# Patient Record
Sex: MALE | Race: WHITE | NOT HISPANIC OR LATINO | URBAN - METROPOLITAN AREA
[De-identification: names, ages, dates, MRNs, and addresses within clinical notes are randomized per-mention and may not be internally consistent; named-entity substitution may affect disease eponyms.]

---

## 2017-01-17 ENCOUNTER — PATIENT MESSAGE (OUTPATIENT)
Dept: FAMILY MEDICINE | Facility: CLINIC | Age: 62
End: 2017-01-17

## 2017-01-17 ENCOUNTER — TELEPHONE (OUTPATIENT)
Dept: FAMILY MEDICINE | Facility: CLINIC | Age: 62
End: 2017-01-17

## 2017-01-17 NOTE — TELEPHONE ENCOUNTER
----- Message from Nenita Howard sent at 1/17/2017  7:49 AM CST -----  Contact: self  Patient's blood sugar is running high and needs an order for A1C bloodwork. Patietn would like to come in for labs tomorrow morning.  Please call patient at [phone]. Thanks!

## 2017-01-19 ENCOUNTER — DOCUMENTATION ONLY (OUTPATIENT)
Dept: FAMILY MEDICINE | Facility: CLINIC | Age: 62
End: 2017-01-19

## 2017-01-19 NOTE — PROGRESS NOTES
Pre-Visit Chart Review  For Appointment Scheduled on 1/20/17    Health Maintenance Due   Topic Date Due    Influenza Vaccine  08/01/2016    Zoster Vaccine  11/13/2016    Hemoglobin A1c  12/27/2016

## 2017-01-20 ENCOUNTER — LAB VISIT (OUTPATIENT)
Dept: LAB | Facility: HOSPITAL | Age: 62
End: 2017-01-20
Attending: NURSE PRACTITIONER
Payer: COMMERCIAL

## 2017-01-20 ENCOUNTER — OFFICE VISIT (OUTPATIENT)
Dept: FAMILY MEDICINE | Facility: CLINIC | Age: 62
End: 2017-01-20
Payer: COMMERCIAL

## 2017-01-20 VITALS
HEIGHT: 61 IN | HEART RATE: 71 BPM | TEMPERATURE: 99 F | DIASTOLIC BLOOD PRESSURE: 68 MMHG | WEIGHT: 177.5 LBS | SYSTOLIC BLOOD PRESSURE: 115 MMHG | BODY MASS INDEX: 33.51 KG/M2

## 2017-01-20 DIAGNOSIS — E11.319 CONTROLLED TYPE 2 DIABETES MELLITUS WITH RETINOPATHY, WITHOUT LONG-TERM CURRENT USE OF INSULIN, MACULAR EDEMA PRESENCE UNSPECIFIED, UNSPECIFIED LATERALITY, UNSPECIFIED RETINOPATHY SEVERITY: Primary | ICD-10-CM

## 2017-01-20 DIAGNOSIS — E11.59 HYPERTENSION ASSOCIATED WITH DIABETES: ICD-10-CM

## 2017-01-20 DIAGNOSIS — E11.9 CONTROLLED TYPE 2 DIABETES MELLITUS WITHOUT COMPLICATION, WITHOUT LONG-TERM CURRENT USE OF INSULIN: ICD-10-CM

## 2017-01-20 DIAGNOSIS — E66.9 OBESITY (BMI 30.0-34.9): ICD-10-CM

## 2017-01-20 DIAGNOSIS — I15.2 HYPERTENSION ASSOCIATED WITH DIABETES: ICD-10-CM

## 2017-01-20 LAB
ALBUMIN SERPL BCP-MCNC: 4.1 G/DL
ALP SERPL-CCNC: 81 U/L
ALT SERPL W/O P-5'-P-CCNC: 42 U/L
ANION GAP SERPL CALC-SCNC: 7 MMOL/L
AST SERPL-CCNC: 26 U/L
BILIRUB SERPL-MCNC: 0.9 MG/DL
BUN SERPL-MCNC: 12 MG/DL
CALCIUM SERPL-MCNC: 9.4 MG/DL
CHLORIDE SERPL-SCNC: 104 MMOL/L
CO2 SERPL-SCNC: 29 MMOL/L
CREAT SERPL-MCNC: 1 MG/DL
EST. GFR  (AFRICAN AMERICAN): >60 ML/MIN/1.73 M^2
EST. GFR  (NON AFRICAN AMERICAN): >60 ML/MIN/1.73 M^2
GLUCOSE SERPL-MCNC: 126 MG/DL
POTASSIUM SERPL-SCNC: 4.5 MMOL/L
PROT SERPL-MCNC: 7.3 G/DL
SODIUM SERPL-SCNC: 140 MMOL/L

## 2017-01-20 PROCEDURE — 36415 COLL VENOUS BLD VENIPUNCTURE: CPT | Mod: PO

## 2017-01-20 PROCEDURE — 83036 HEMOGLOBIN GLYCOSYLATED A1C: CPT

## 2017-01-20 PROCEDURE — 4010F ACE/ARB THERAPY RXD/TAKEN: CPT | Mod: S$GLB,,, | Performed by: NURSE PRACTITIONER

## 2017-01-20 PROCEDURE — 3044F HG A1C LEVEL LT 7.0%: CPT | Mod: S$GLB,,, | Performed by: NURSE PRACTITIONER

## 2017-01-20 PROCEDURE — 3078F DIAST BP <80 MM HG: CPT | Mod: S$GLB,,, | Performed by: NURSE PRACTITIONER

## 2017-01-20 PROCEDURE — 80053 COMPREHEN METABOLIC PANEL: CPT

## 2017-01-20 PROCEDURE — 99214 OFFICE O/P EST MOD 30 MIN: CPT | Mod: S$GLB,,, | Performed by: NURSE PRACTITIONER

## 2017-01-20 PROCEDURE — 2022F DILAT RTA XM EVC RTNOPTHY: CPT | Mod: S$GLB,,, | Performed by: NURSE PRACTITIONER

## 2017-01-20 PROCEDURE — 99999 PR PBB SHADOW E&M-EST. PATIENT-LVL III: CPT | Mod: PBBFAC,,, | Performed by: NURSE PRACTITIONER

## 2017-01-20 PROCEDURE — 3074F SYST BP LT 130 MM HG: CPT | Mod: S$GLB,,, | Performed by: NURSE PRACTITIONER

## 2017-01-20 PROCEDURE — 1159F MED LIST DOCD IN RCRD: CPT | Mod: S$GLB,,, | Performed by: NURSE PRACTITIONER

## 2017-01-20 NOTE — MR AVS SNAPSHOT
[facility]  [address]  [place] [address]  Phone: [phone]  Fax: [phone]                  Aleksandra Keys   2017 11:00 AM   Office Visit    Description:  Male : 1955   Provider:  GUERA Kelly   Department:  [facility]           Reason for Visit     Diabetes           Diagnoses this Visit        Comments    Controlled type 2 diabetes mellitus without complication, without long-term current use of insulin    -  Primary            To Do List           Future Appointments        Provider Department Dept Phone    2017 3:00 PM GUERA Kelly [facility] [phone]      Goals (5 Years of Data)     None      Follow-Up and Disposition     Return in about 3 months (around 2017) for labs now.      [facility]     [facility] - 24/7 Assistance  Registered nurses in the [facility] provide clinical advisement, health education, appointment booking, and other advisory services.  Call for this free service at [phone].             Medications           Message regarding Medications     Verify the changes and/or additions to your medication regime listed below are the same as discussed with your clinician today.  If any of these changes or additions are incorrect, please notify your healthcare provider.             Verify that the below list of medications is an accurate representation of the medications you are currently taking.  If none reported, the list may be blank. If incorrect, please contact your healthcare provider. Carry this list with you in case of emergency.           Current Medications     alprazolam (XANAX) 0.25 MG tablet Take 0.25 mg by mouth nightly as needed.     aspirin 81 mg Tab Take 1 tablet by mouth once daily.     atorvastatin (LIPITOR) 40 MG tablet TAKE ONE TABLET BY MOUTH ONCE DAILY    blood-glucose meter (ACCU-CHEK DUGLAS) Misc Check blood sugar once daily,  "accu-check felton glucometer kit with fastclix lancet device    lancets (ACCU-CHEK FASTCLIX) Misc Check sugar once daily, fastclix lancet drum    lisinopril (PRINIVIL,ZESTRIL) 2.5 MG tablet TAKE ONE TABLET BY MOUTH ONCE DAILY    metformin (GLUCOPHAGE-XR) 500 MG 24 hr tablet TAKE TWO TABLETS BY MOUTH ONCE DAILY    metoprolol succinate (TOPROL-XL) 25 MG 24 hr tablet Take 1 tablet (25 mg total) by mouth once daily.           Clinical Reference Information           Vital Signs - Last Recorded  Most recent update: 1/20/2017 10:55 AM by Bhumi Conroy MA    BP Pulse Temp Ht Wt BMI    115/68 (BP Location: Right arm, Patient Position: Sitting, BP Method: Automatic) 71 98.5 °F (36.9 °C) (Oral) 5' 1" (1.549 m) 80.5 kg (177 lb 7.5 oz) 33.53 kg/m2      Blood Pressure          Most Recent Value    BP  115/68      Allergies as of 1/20/2017     Niacin      Immunizations Administered on Date of Encounter - 1/20/2017     None      Orders Placed During Today's Visit     Future Labs/Procedures Expected by Expires    Comprehensive metabolic panel  1/20/2017 3/21/2018    Hemoglobin A1c  1/20/2017 3/21/2018      Instructions      Controlling High Blood Pressure  High blood pressure (hypertension) is often called the silent killer. This is because many people who have it dont know it. High blood pressure is defined as 140/90 mm Hg or higher. Know your blood pressure and remember to check it regularly. Doing so can save your life. Here are some things you can do to help control your blood pressure.    Choose heart-healthy foods  · Select low-salt, low-fat foods. Limit sodium intake to 2,400 mg per day or the amount suggested by your healthcare provider.  · Limit canned, dried, cured, packaged, and fast foods. These can contain a lot of salt.  · Eat 8 to 10 servings of fruits and vegetables every day.  · Choose lean meats, fish, or chicken.  · Eat whole-grain pasta, brown rice, and beans.  · Eat 2 to 3 servings of low-fat or fat-free " dairy products.  · Ask your doctor about the DASH eating plan. This plan helps reduce blood pressure.  · When you go to a restaurant, ask that your meal be prepared with no added salt.  Maintain a healthy weight  · Ask your healthcare provider how many calories to eat a day. Then stick to that number.  · Ask your healthcare provider what weight range is healthiest for you. If you are overweight, a weight loss of only 3% to 5% of your body weight can help lower blood pressure. Generally, a good weight loss goal is to lose 10% of your body weight in a year.  · Limit snacks and sweets.  · Get regular exercise.  Get up and get active  · Choose activities you enjoy. Find ones you can do with friends or family. This includes bicycling, dancing, walking, and jogging.  · Park farther away from building entrances.  · Use stairs instead of the elevator.  · When you can, walk or bike instead of driving.  · [place] leaves, garden, or do household repairs.  · Be active at a moderate to vigorous level of physical activity for at least 40 minutes for a minimum of 3 to 4 days a week.   Manage stress  · Make time to relax and enjoy life. Find time to laugh.  · Communicate your concerns with your loved ones and your healthcare provider.  · Visit with family and friends, and keep up with hobbies.  Limit alcohol and quit smoking  · Men should have no more than 2 drinks per day.  · Women should have no more than 1 drink per day.  · Talk with your healthcare provider about quitting smoking. Smoking significantly increases your risk for heart disease and stroke. Ask your healthcare provider about community smoking cessation programs and other options.  Medicines  If lifestyle changes arent enough, your healthcare provider may prescribe high blood pressure medicine. Take all medicines as prescribed. If you have any questions about your medicines, ask your healthcare provider before stopping or changing them.   © 4277-8774 [employer]. [address] [place] [address], [place] [address]. All rights reserved. This information is not intended as a substitute for professional medical care. Always follow your healthcare professional's instructions.        Diabetes (General Information)  Diabetes is a long-term health problem. It means your body does not make enough insulin. Or it may mean that your body cannot use the insulin it makes. Insulin is a hormone in your body. It lets blood sugar (glucose) reach the cells in your body. All of your cells need glucose for fuel.  When you have diabetes, the glucose in your blood builds up because it cannot get into the cells. This buildup is called high blood sugar (hyperglycemia).  Your blood sugar level depends on several things. It depends on what kind of food you eat and how much of it you eat. It also depends on how much exercise you get, and how much insulin you have in your body. Eating too much of the wrong kinds of food or not taking diabetes medicine on time can cause high blood sugar. Infections can cause high blood sugar even if you are taking medicines correctly.  These things can also cause low blood sugar:  · Missing meals  · Not eating enough food  · Taking too much diabetes medicine  Diabetes can cause serious problems over time if you do not get treated. These problems include heart disease, stroke, kidney failure, and blindness. They also include nerve pain or loss of feeling in your legs and feet, and gangrene of the feet. By keeping your blood sugar under control you can prevent or delay these problems.  Normal blood sugar levels are 80 to 100 before a meal and less than 180 in the 1 to 2 hours after a meal.  Home care  Follow these guidelines when caring for yourself at home:  · Follow the diet your healthcare provider gives you. Take insulin or other diabetes medicine exactly as told to.  · Watch your blood sugar as you are told to. Keep a log of your results. This will help your  provider change your medicines to keep your blood sugar under control.  · Try to reach your ideal weight. You may be able to cut back on or not have to take diabetes medicine if you eat the right foods and get exercise.  · Do not smoke. Smoking worsens the effects of diabetes on your circulation. You are much more likely to have a heart attack if you have diabetes and you smoke.  · Take good care of your feet. If you have lost feeling in your feet, you may not see an injury or infection. Check your feet and between your toes at least once a week.  · Wear a medical alert bracelet or necklace, or carry a card in your wallet that says you have diabetes. This will help healthcare providers give you the right care if you get very ill and cannot tell them that you have diabetes.  Sick day plan  If you get a cold, the flu, or a bacterial or viral infection, take these steps:  · Look at your diabetes sick plan and call your healthcare provider as you were told to. You may need to call your provider right away if:  ¨ Your blood sugar is above 240 while taking your diabetes medicine  ¨ Your urine ketone levels are above normal or high  ¨ You have been vomiting more than 6 hours  ¨ You have trouble breathing or your breath ha s a fruity smell  ¨ You have a high fever  ¨ You have a fever for several days and you are not getting better  ¨ You get light-headed and are sleepier than usual  · Keep taking your diabetes pills (oral medicine) even if you have been vomiting and are feeling sick. Call your provider right away because you may need insulin to lower your blood sugar until you recover from your illness.  · Keep taking your insulin even if you have been vomiting and are feeling sick. Call your provider right away to ask if you need to change your insulin dose. This will depend on your blood sugar results.  · Check your blood sugar every 2 to 4 hours, or at least 4 times a day.  · Check your ketones often. If you are vomiting  and having diarrhea, watch them more often.  · Do not skip meals. Try to eat small meals on a regular schedule. Do this even if you do not feel like eating.  · Drink water or other liquids that do not have caffeine or calories. This will keep you from getting dehydrated. If you are nauseated or vomiting, takes small sips every 5 minutes. To prevent dehydration try to drink a cup (8 ounces) of fluids every hour while you are awake.  General care  Always bring a source of fast-acting sugar with you in case you have symptoms of low blood sugar (below 70). At the first sign of low blood sugar, eat or drink 15 to 20 grams of fast-acting sugar to raise your blood sugar. Examples are:  · 3 to 4 glucose tablets. You can buy these at most drugstores.  · 4 ounces (1/2 cup) of regular (not diet) soft drinks  · 4 ounces (1/2 cup) of any fruit juice  · 8 ounces (1 cup) of milk  · 5 to 6 pieces of hard candy  · 1 tablespoon of honey  Check your blood sugar 15 minutes after treating yourself. If it is still below 70, take 15 to 20 more grams of fast-acting sugar. Test again in 15 minutes. If it returns to normal (70 or above), eat a snack or meal to keep your blood sugar in a safe range. If it stays low, call your doctor or go to an emergency room.  Follow-up care  Follow-up with your healthcare provider, or as advised. For more information about diabetes, visit the American Diabetes Association website at www.diabetes.org or call 606-582-4285.  When to seek medical advice  Call your healthcare provider right away if you have any of these symptoms of high blood sugar:  · Frequent urination  · Dizziness  · Drowsiness  · Thirst  · Headache  · Nausea or vomiting  · Abdominal pain  · Eyesight changes  · Fast breathing  · Confusion or loss of consciousness  Also call your provider right away if you have any of these signs of low blood sugar:  · Fatigue  · Headache  · Shakes  · Excess sweating  · Hunger  · Feeling anxious or  restless  · Eyesight changes  · Drowsiness  · Weakness  · Confusion or loss of consciousness  Call 911  Call for emergency help right away if any of these occur:  · Chest pain or shortness of breath  · Dizziness or fainting  · Weakness of an arm or leg or one side of the face  · Trouble speaking or seeing   © 20007665-1503 [employer]. [address] [place] [address], [place] [address]. All rights reserved. This information is not intended as a substitute for professional medical care. Always follow your healthcare professional's instructions.        Weight Management: Getting Started  Healthy bodies come in all shapes and sizes. Not all bodies are made to be thin. For some people, a healthy weight is higher than the average weight listed on weight charts. Your healthcare provider can help you decide on a healthy weight for you.    Reasons to lose weight  Losing weight can help with some health problems, such as high blood pressure, heart disease, diabetes, sleep apnea, and arthritis. You may also feel more energy.  Set your long-term goal  Your goal doesn't even have to be a specific weight. You may decide on a fitness goal (such as being able to walk 10 miles a week), or a health goal (such as lowering your blood pressure). Choose a goal that is measurable and reasonable, so you know when you've reached it. A goal of reaching a BMI of less than 25 is not always reasonable (or possible).   Make an action plan  Habits dont change overnight. Setting your goals too high can leave you feeling discouraged if you cant reach them. Be realistic. Choose one or two small changes you can make now. Set an action plan for how you are going to make these changes. When you can stick to this plan, keep making a few more small changes. Taking small steps will help you stay on the path to success.  Track your progress  Write down your goals. Then, keep a daily record of your progress. Write down what you eat and how active you  are. This record lets you look back on how much youve done. It may also help when youre feeling frustrated. Reward yourself for success. Even if you dont reach every goal, give yourself credit for what you do get done.  Get support  Encouragement from others can help make losing weight easier. Ask your family members and friends for support. They may even want to join you. Also look to your healthcare provider, registered dietitian, and  for help. Your local hospital can give you more information about nutrition, exercise, and weight loss.  © 1999-7238 "[employer] [address] [place] [address], [place] [address]. All rights reserved. This information is not intended as a substitute for professional medical care. Always follow your healthcare professional's instructions.        Walking for Fitness  Fitness walking has something for everyone, even people who are already fit. Walking is one of the safest ways to condition your body aerobically. It can boost energy, help you lose weight, and reduce stress.    Physical benefits  · Walking strengthens your heart and lungs, and tones your muscles.  · When walking, your feet land with less impact than in other sports. This reduces chances of muscle, bone, and joint injury.  · Regular walking improves your cholesterol levels and lowers your risk of heart disease. And it helps you control your blood sugar if you have diabetes.  · Walking is a weight-bearing activity, which helps maintain bone density. This can help prevent osteoporosis.  Personal rewards  · Taking walks can help you relax and manage stress. And fitness walking may make you feel better about yourself.  · Walking can help you sleep better at night and make you less likely to be depressed.  · Regular walking may help maintain your memory as you get older.  · Walking is a great way to spend extra time with friends and family members. Be sure to invite your dog along!  Q&A about  fitness walking  Q: Will walking keep me fit?  A: Yes. Regular walking at the right pace gives you all the benefits of other aerobic activities, such as jogging and swimming.  Q: Will walking help me lose weight and keep it off?  A: Yes. Per mile, walking can burn as many calories as jogging. Your health care provider can help work walking into your weight-loss plan.  Q: Is walking safe for my health?  A: Yes. Walking is safe if you have high blood pressure, diabetes, heart disease, or other conditions. Talk to your health care provider before you start.  © 8978-7569 [employer]. [address] [place] [address], [place] [address]. All rights reserved. This information is not intended as a substitute for professional medical care. Always follow your healthcare professional's instructions.      [employer]

## 2017-01-20 NOTE — PROGRESS NOTES
Subjective:       Patient ID: Aleksandra Keys is a 61 y.o. male.    Chief Complaint: Diabetes    HPI Comments: Mr. Keys presents to the clinic today for follow up for diabetes.  He states his blood sugars have been running a little higher recently.  He checks in the morning before breakfast and blood sugars recently have been 160's-180's.  He takes metformin.  He wants to keep A1c well controlled due to family history of complications from diabetes.  He has an active job but does not exercise routinely.  He attempts to eat a healthy diet.  He states he just saw his Ophthalmologist and he has diabetic retinopathy.  He is due for A1c.  He had flu shot at Urgent Care.      Diabetes   He has type 2 diabetes mellitus. No MedicAlert identification noted. The initial diagnosis of diabetes was made 6 years ago. Hypoglycemia symptoms include sweats. Pertinent negatives for hypoglycemia include no confusion, dizziness, headaches, hunger, mood changes, nervousness/anxiousness, pallor, seizures, sleepiness, speech difficulty or tremors. Pertinent negatives for diabetes include no blurred vision, no chest pain, no fatigue, no foot paresthesias, no foot ulcerations, no polydipsia, no polyphagia, no polyuria, no visual change, no weakness and no weight loss. Pertinent negatives for hypoglycemia complications include no blackouts, no hospitalization, no nocturnal hypoglycemia, no required assistance and no required glucagon injection. Symptoms are worsening. Pertinent negatives for diabetic complications include no autonomic neuropathy, CVA, heart disease, impotence, nephropathy, peripheral neuropathy, PVD or retinopathy. Risk factors for coronary artery disease include dyslipidemia, hypertension, obesity, diabetes mellitus and male sex. Current diabetic treatment includes diet and oral agent (monotherapy). He is compliant with treatment most of the time. His weight is fluctuating minimally. He is following a generally healthy  diet. Meal planning includes avoidance of concentrated sweets. He has not had a previous visit with a dietitian. He participates in exercise intermittently. He monitors blood glucose at home 1-2 x per day. Blood glucose monitoring compliance is fair. His home blood glucose trend is increasing steadily. He does not see a podiatrist.Eye exam is current.     Review of Systems   Constitutional: Negative for chills, fatigue, unexpected weight change and weight loss.   HENT: Negative for congestion, ear discharge, ear pain, postnasal drip and sinus pressure.    Eyes: Negative for blurred vision and visual disturbance.   Respiratory: Negative for cough and shortness of breath.    Cardiovascular: Negative for chest pain.   Endocrine: Negative for polydipsia, polyphagia and polyuria.   Genitourinary: Negative for impotence.   Skin: Negative for pallor.   Neurological: Negative for dizziness, tremors, seizures, speech difficulty, weakness and headaches.   Psychiatric/Behavioral: Negative for confusion. The patient is not nervous/anxious.        Objective:      Physical Exam   Constitutional: He is oriented to person, place, and time. He appears well-developed and well-nourished. No distress.   HENT:   Head: Normocephalic and atraumatic.   Right Ear: External ear normal.   Left Ear: External ear normal.   Mouth/Throat: Oropharynx is clear and moist. No oropharyngeal exudate.   Eyes: Pupils are equal, round, and reactive to light. Right eye exhibits no discharge. Left eye exhibits no discharge.   Neck: Neck supple. No thyromegaly present.   Cardiovascular: Normal rate and regular rhythm.  Exam reveals no gallop and no friction rub.    No murmur heard.  Pulmonary/Chest: Effort normal and breath sounds normal. No respiratory distress. He has no wheezes. He has no rales.   Abdominal: Soft. He exhibits no distension. There is no tenderness.   Lymphadenopathy:     He has no cervical adenopathy.   Neurological: He is alert and  oriented to person, place, and time. Coordination normal.   Skin: Skin is warm and dry.   Psychiatric: He has a normal mood and affect. His behavior is normal. Thought content normal.   Vitals reviewed.          Current Outpatient Prescriptions:     alprazolam (XANAX) 0.25 MG tablet, Take 0.25 mg by mouth nightly as needed. , Disp: , Rfl: 5    aspirin 81 mg Tab, Take 1 tablet by mouth once daily. , Disp: , Rfl:     atorvastatin (LIPITOR) 40 MG tablet, TAKE ONE TABLET BY MOUTH ONCE DAILY, Disp: 30 tablet, Rfl: 9    blood-glucose meter (ACCU-CHEK DUGLAS) Misc, Check blood sugar once daily, accu-check duglas glucometer kit with fastclix lancet device, Disp: 1 each, Rfl: 0    lancets (ACCU-CHEK FASTCLIX) Misc, Check sugar once daily, fastclix lancet drum, Disp: 50 each, Rfl: 11    lisinopril (PRINIVIL,ZESTRIL) 2.5 MG tablet, TAKE ONE TABLET BY MOUTH ONCE DAILY, Disp: 30 tablet, Rfl: 11    metformin (GLUCOPHAGE-XR) 500 MG 24 hr tablet, TAKE TWO TABLETS BY MOUTH ONCE DAILY, Disp: 60 tablet, Rfl: 3    metoprolol succinate (TOPROL-XL) 25 MG 24 hr tablet, Take 1 tablet (25 mg total) by mouth once daily., Disp: 30 tablet, Rfl: 11  Assessment:       1. Controlled type 2 diabetes mellitus with retinopathy, without long-term current use of insulin, macular edema presence unspecified, unspecified laterality, unspecified retinopathy severity    2. Obesity (BMI 30.0-34.9)    3. Hypertension associated with diabetes        Plan:      Controlled type 2 diabetes mellitus with retinopathy, without long-term current use of insulin, macular edema presence unspecified, unspecified laterality, unspecified retinopathy severity  Wants A1c goal at 6.5.  Also would like to consider Victoza.  Discussed risks of treatment, findings that showed thyroid cancer in lab rats.  May help with weight loss.  -     Comprehensive metabolic panel; Future; Expected date: 1/20/17  -     Hemoglobin A1c; Future; Expected date: 1/20/17    Obesity (BMI  30.0-34.9)    Hypertension associated with diabetes     Patient readiness: acceptance and barriers:social stressors and occupational issues    During the course of the visit the patient was educated and counseled about the following:     Diabetes:  Discussed general issues about diabetes pathophysiology and management.  Counseling at today's visit: discussed the need for weight loss.  Encouraged aerobic exercise.  Discussed foot care.  Reminded to get yearly retinal exam.  Follow up in 3 months or as needed.  Hypertension:   Dietary sodium restriction.  Regular aerobic exercise.  Follow up: 3 months and as needed.  Obesity:   Diet interventions: moderate (500 kCal/d) deficit diet and qualitative changes (increase low-fat,  high-fiber foods).  Regular aerobic exercise program discussed.    Goals: Diabetes: Maintain Hemoglobin A1C below 7, Hypertension: Reduce Blood Pressure and Obesity: Reduce calorie intake and BMI    Did patient meet goals/outcomes: No    The following self management tools provided: declined    Patient Instructions (the written plan) was given to the patient/family.     Time spent with patient: 30 minutes

## 2017-01-20 NOTE — PATIENT INSTRUCTIONS
Controlling High Blood Pressure  High blood pressure (hypertension) is often called the silent killer. This is because many people who have it dont know it. High blood pressure is defined as 140/90 mm Hg or higher. Know your blood pressure and remember to check it regularly. Doing so can save your life. Here are some things you can do to help control your blood pressure.    Choose heart-healthy foods  · Select low-salt, low-fat foods. Limit sodium intake to 2,400 mg per day or the amount suggested by your healthcare provider.  · Limit canned, dried, cured, packaged, and fast foods. These can contain a lot of salt.  · Eat 8 to 10 servings of fruits and vegetables every day.  · Choose lean meats, fish, or chicken.  · Eat whole-grain pasta, brown rice, and beans.  · Eat 2 to 3 servings of low-fat or fat-free dairy products.  · Ask your doctor about the DASH eating plan. This plan helps reduce blood pressure.  · When you go to a restaurant, ask that your meal be prepared with no added salt.  Maintain a healthy weight  · Ask your healthcare provider how many calories to eat a day. Then stick to that number.  · Ask your healthcare provider what weight range is healthiest for you. If you are overweight, a weight loss of only 3% to 5% of your body weight can help lower blood pressure. Generally, a good weight loss goal is to lose 10% of your body weight in a year.  · Limit snacks and sweets.  · Get regular exercise.  Get up and get active  · Choose activities you enjoy. Find ones you can do with friends or family. This includes bicycling, dancing, walking, and jogging.  · Park farther away from building entrances.  · Use stairs instead of the elevator.  · When you can, walk or bike instead of driving.  · [place] leaves, garden, or do household repairs.  · Be active at a moderate to vigorous level of physical activity for at least 40 minutes for a minimum of 3 to 4 days a week.   Manage stress  · Make time to relax and enjoy  life. Find time to laugh.  · Communicate your concerns with your loved ones and your healthcare provider.  · Visit with family and friends, and keep up with hobbies.  Limit alcohol and quit smoking  · Men should have no more than 2 drinks per day.  · Women should have no more than 1 drink per day.  · Talk with your healthcare provider about quitting smoking. Smoking significantly increases your risk for heart disease and stroke. Ask your healthcare provider about community smoking cessation programs and other options.  Medicines  If lifestyle changes arent enough, your healthcare provider may prescribe high blood pressure medicine. Take all medicines as prescribed. If you have any questions about your medicines, ask your healthcare provider before stopping or changing them.   © 9220-8569 [employer]. [address] [place] [address], [place] [address]. All rights reserved. This information is not intended as a substitute for professional medical care. Always follow your healthcare professional's instructions.        Diabetes (General Information)  Diabetes is a long-term health problem. It means your body does not make enough insulin. Or it may mean that your body cannot use the insulin it makes. Insulin is a hormone in your body. It lets blood sugar (glucose) reach the cells in your body. All of your cells need glucose for fuel.  When you have diabetes, the glucose in your blood builds up because it cannot get into the cells. This buildup is called high blood sugar (hyperglycemia).  Your blood sugar level depends on several things. It depends on what kind of food you eat and how much of it you eat. It also depends on how much exercise you get, and how much insulin you have in your body. Eating too much of the wrong kinds of food or not taking diabetes medicine on time can cause high blood sugar. Infections can cause high blood sugar even if you are taking medicines correctly.  These things can also cause low  blood sugar:  · Missing meals  · Not eating enough food  · Taking too much diabetes medicine  Diabetes can cause serious problems over time if you do not get treated. These problems include heart disease, stroke, kidney failure, and blindness. They also include nerve pain or loss of feeling in your legs and feet, and gangrene of the feet. By keeping your blood sugar under control you can prevent or delay these problems.  Normal blood sugar levels are 80 to 100 before a meal and less than 180 in the 1 to 2 hours after a meal.  Home care  Follow these guidelines when caring for yourself at home:  · Follow the diet your healthcare provider gives you. Take insulin or other diabetes medicine exactly as told to.  · Watch your blood sugar as you are told to. Keep a log of your results. This will help your provider change your medicines to keep your blood sugar under control.  · Try to reach your ideal weight. You may be able to cut back on or not have to take diabetes medicine if you eat the right foods and get exercise.  · Do not smoke. Smoking worsens the effects of diabetes on your circulation. You are much more likely to have a heart attack if you have diabetes and you smoke.  · Take good care of your feet. If you have lost feeling in your feet, you may not see an injury or infection. Check your feet and between your toes at least once a week.  · Wear a medical alert bracelet or necklace, or carry a card in your wallet that says you have diabetes. This will help healthcare providers give you the right care if you get very ill and cannot tell them that you have diabetes.  Sick day plan  If you get a cold, the flu, or a bacterial or viral infection, take these steps:  · Look at your diabetes sick plan and call your healthcare provider as you were told to. You may need to call your provider right away if:  ¨ Your blood sugar is above 240 while taking your diabetes medicine  ¨ Your urine ketone levels are above normal or  high  ¨ You have been vomiting more than 6 hours  ¨ You have trouble breathing or your breath ha s a fruity smell  ¨ You have a high fever  ¨ You have a fever for several days and you are not getting better  ¨ You get light-headed and are sleepier than usual  · Keep taking your diabetes pills (oral medicine) even if you have been vomiting and are feeling sick. Call your provider right away because you may need insulin to lower your blood sugar until you recover from your illness.  · Keep taking your insulin even if you have been vomiting and are feeling sick. Call your provider right away to ask if you need to change your insulin dose. This will depend on your blood sugar results.  · Check your blood sugar every 2 to 4 hours, or at least 4 times a day.  · Check your ketones often. If you are vomiting and having diarrhea, watch them more often.  · Do not skip meals. Try to eat small meals on a regular schedule. Do this even if you do not feel like eating.  · Drink water or other liquids that do not have caffeine or calories. This will keep you from getting dehydrated. If you are nauseated or vomiting, takes small sips every 5 minutes. To prevent dehydration try to drink a cup (8 ounces) of fluids every hour while you are awake.  General care  Always bring a source of fast-acting sugar with you in case you have symptoms of low blood sugar (below 70). At the first sign of low blood sugar, eat or drink 15 to 20 grams of fast-acting sugar to raise your blood sugar. Examples are:  · 3 to 4 glucose tablets. You can buy these at most drugstores.  · 4 ounces (1/2 cup) of regular (not diet) soft drinks  · 4 ounces (1/2 cup) of any fruit juice  · 8 ounces (1 cup) of milk  · 5 to 6 pieces of hard candy  · 1 tablespoon of honey  Check your blood sugar 15 minutes after treating yourself. If it is still below 70, take 15 to 20 more grams of fast-acting sugar. Test again in 15 minutes. If it returns to normal (70 or above), eat a  snack or meal to keep your blood sugar in a safe range. If it stays low, call your doctor or go to an emergency room.  Follow-up care  Follow-up with your healthcare provider, or as advised. For more information about diabetes, visit the American Diabetes Association website at www.diabetes.org or call 822-264-3537.  When to seek medical advice  Call your healthcare provider right away if you have any of these symptoms of high blood sugar:  · Frequent urination  · Dizziness  · Drowsiness  · Thirst  · Headache  · Nausea or vomiting  · Abdominal pain  · Eyesight changes  · Fast breathing  · Confusion or loss of consciousness  Also call your provider right away if you have any of these signs of low blood sugar:  · Fatigue  · Headache  · Shakes  · Excess sweating  · Hunger  · Feeling anxious or restless  · Eyesight changes  · Drowsiness  · Weakness  · Confusion or loss of consciousness  Call [phone]  Call for emergency help right away if any of these occur:  · Chest pain or shortness of breath  · Dizziness or fainting  · Weakness of an arm or leg or one side of the face  · Trouble speaking or seeing   © 0517-6999 [employer]. [address] [place] [address]. All rights reserved. This information is not intended as a substitute for professional medical care. Always follow your healthcare professional's instructions.        Weight Management: Getting Started  Healthy bodies come in all shapes and sizes. Not all bodies are made to be thin. For some people, a healthy weight is higher than the average weight listed on weight charts. Your healthcare provider can help you decide on a healthy weight for you.    Reasons to lose weight  Losing weight can help with some health problems, such as high blood pressure, heart disease, diabetes, sleep apnea, and arthritis. You may also feel more energy.  Set your long-term goal  Your goal doesn't even have to be a specific weight. You may decide on a fitness goal  (such as being able to walk 10 miles a week), or a health goal (such as lowering your blood pressure). Choose a goal that is measurable and reasonable, so you know when you've reached it. A goal of reaching a BMI of less than 25 is not always reasonable (or possible).   Make an action plan  Habits dont change overnight. Setting your goals too high can leave you feeling discouraged if you cant reach them. Be realistic. Choose one or two small changes you can make now. Set an action plan for how you are going to make these changes. When you can stick to this plan, keep making a few more small changes. Taking small steps will help you stay on the path to success.  Track your progress  Write down your goals. Then, keep a daily record of your progress. Write down what you eat and how active you are. This record lets you look back on how much youve done. It may also help when youre feeling frustrated. Reward yourself for success. Even if you dont reach every goal, give yourself credit for what you do get done.  Get support  Encouragement from others can help make losing weight easier. Ask your family members and friends for support. They may even want to join you. Also look to your healthcare provider, registered dietitian, and  for help. Your local hospital can give you more information about nutrition, exercise, and weight loss.  © 1375-1446 [employer]. [address] [place] [address], [place] [address]. All rights reserved. This information is not intended as a substitute for professional medical care. Always follow your healthcare professional's instructions.        Walking for Fitness  Fitness walking has something for everyone, even people who are already fit. Walking is one of the safest ways to condition your body aerobically. It can boost energy, help you lose weight, and reduce stress.    Physical benefits  · Walking strengthens your heart and lungs, and tones your muscles.  · When  walking, your feet land with less impact than in other sports. This reduces chances of muscle, bone, and joint injury.  · Regular walking improves your cholesterol levels and lowers your risk of heart disease. And it helps you control your blood sugar if you have diabetes.  · Walking is a weight-bearing activity, which helps maintain bone density. This can help prevent osteoporosis.  Personal rewards  · Taking walks can help you relax and manage stress. And fitness walking may make you feel better about yourself.  · Walking can help you sleep better at night and make you less likely to be depressed.  · Regular walking may help maintain your memory as you get older.  · Walking is a great way to spend extra time with friends and family members. Be sure to invite your dog along!  Q&A about fitness walking  Q: Will walking keep me fit?  A: Yes. Regular walking at the right pace gives you all the benefits of other aerobic activities, such as jogging and swimming.  Q: Will walking help me lose weight and keep it off?  A: Yes. Per mile, walking can burn as many calories as jogging. Your health care provider can help work walking into your weight-loss plan.  Q: Is walking safe for my health?  A: Yes. Walking is safe if you have high blood pressure, diabetes, heart disease, or other conditions. Talk to your health care provider before you start.  © 7326-6848 The [employer]. [address] [place] [address], [place] [address]. All rights reserved. This information is not intended as a substitute for professional medical care. Always follow your healthcare professional's instructions.      [employer]

## 2017-01-22 LAB
ESTIMATED AVG GLUCOSE: 143 MG/DL
HBA1C MFR BLD HPLC: 6.6 %

## 2017-01-23 RX ORDER — METFORMIN HYDROCHLORIDE 500 MG/1
TABLET, EXTENDED RELEASE ORAL
Qty: 90 TABLET | Refills: 3 | Status: SHIPPED | OUTPATIENT
Start: 2017-01-23 | End: 2017-06-24 | Stop reason: SDUPTHER

## 2017-04-18 ENCOUNTER — DOCUMENTATION ONLY (OUTPATIENT)
Dept: FAMILY MEDICINE | Facility: CLINIC | Age: 62
End: 2017-04-18

## 2017-04-18 NOTE — PROGRESS NOTES
Pre-Visit Chart Review  For Appointment Scheduled on 4/21/17    Health Maintenance Due   Topic Date Due    Influenza Vaccine  08/01/2016    Zoster Vaccine  11/13/2016

## 2017-04-21 ENCOUNTER — OFFICE VISIT (OUTPATIENT)
Dept: FAMILY MEDICINE | Facility: CLINIC | Age: 62
End: 2017-04-21
Payer: COMMERCIAL

## 2017-04-21 VITALS
DIASTOLIC BLOOD PRESSURE: 66 MMHG | TEMPERATURE: 98 F | HEIGHT: 61 IN | WEIGHT: 176.13 LBS | BODY MASS INDEX: 33.25 KG/M2 | HEART RATE: 79 BPM | SYSTOLIC BLOOD PRESSURE: 104 MMHG

## 2017-04-21 DIAGNOSIS — E11.59 HYPERTENSION ASSOCIATED WITH DIABETES: ICD-10-CM

## 2017-04-21 DIAGNOSIS — E11.9 CONTROLLED TYPE 2 DIABETES MELLITUS WITHOUT COMPLICATION, WITHOUT LONG-TERM CURRENT USE OF INSULIN: ICD-10-CM

## 2017-04-21 DIAGNOSIS — E66.9 OBESITY, UNSPECIFIED OBESITY SEVERITY, UNSPECIFIED OBESITY TYPE: Primary | ICD-10-CM

## 2017-04-21 DIAGNOSIS — I15.2 HYPERTENSION ASSOCIATED WITH DIABETES: ICD-10-CM

## 2017-04-21 PROCEDURE — 4010F ACE/ARB THERAPY RXD/TAKEN: CPT | Mod: S$GLB,,, | Performed by: NURSE PRACTITIONER

## 2017-04-21 PROCEDURE — 99214 OFFICE O/P EST MOD 30 MIN: CPT | Mod: S$GLB,,, | Performed by: NURSE PRACTITIONER

## 2017-04-21 PROCEDURE — 3074F SYST BP LT 130 MM HG: CPT | Mod: S$GLB,,, | Performed by: NURSE PRACTITIONER

## 2017-04-21 PROCEDURE — 3078F DIAST BP <80 MM HG: CPT | Mod: S$GLB,,, | Performed by: NURSE PRACTITIONER

## 2017-04-21 PROCEDURE — 3044F HG A1C LEVEL LT 7.0%: CPT | Mod: S$GLB,,, | Performed by: NURSE PRACTITIONER

## 2017-04-21 PROCEDURE — 99999 PR PBB SHADOW E&M-EST. PATIENT-LVL III: CPT | Mod: PBBFAC,,, | Performed by: NURSE PRACTITIONER

## 2017-04-21 PROCEDURE — 1160F RVW MEDS BY RX/DR IN RCRD: CPT | Mod: S$GLB,,, | Performed by: NURSE PRACTITIONER

## 2017-04-21 NOTE — MR AVS SNAPSHOT
[facility]  [address]  [place] [address]  Phone: [phone]  Fax: [phone]                  Aleksandra Keys   2017 3:00 PM   Office Visit    Description:  Male : 1955   Provider:  GUERA Kelly   Department:  [facility]           Reason for Visit     Diabetes           Diagnoses this Visit        Comments    Obesity, unspecified obesity severity, unspecified obesity type    -  Primary     Controlled type 2 diabetes mellitus without complication, without long-term current use of insulin                To Do List           Future Appointments        Provider Department Dept Phone    2017 10:00 AM PASHA, ENDOCRINE EDUCATOR [place] [facility] [phone]    2017 3:00 PM GUERA Kelly [facility] [phone]      Goals (5 Years of Data)     None      Follow-Up and Disposition     Return in about 3 months (around 2017).       These Medications        Disp Refills Start End    liraglutide 0.6 mg/0.1 mL, 18 mg/3 mL, subq PNIJ (VICTOZA 2-FRANK) 0.6 mg/0.1 mL (18 mg/3 mL) PnIj 3 mL 11 2017     0.6 mg once daily for two weeks; increase by 0.6 mg daily at weekly intervals to a target dose of 3 mg once daily.    Pharmacy: [address] Ph #: [phone]         [facility]     [facility] -  Assistance  Unless otherwise directed by your provider, please contact [facility], our nurse care line that is available for  assistance.     Registered nurses in the [facility] provide: appointment scheduling, clinical advisement, health education, and other advisory services.  Call: [phone] (toll free)               Medications           Message regarding Medications     Verify the changes and/or additions to your medication regime listed below are the same as discussed with your clinician today.   "If any of these changes or additions are incorrect, please notify your healthcare provider.        START taking these NEW medications        Refills    liraglutide 0.6 mg/0.1 mL, 18 mg/3 mL, subq PNIJ (VICTOZA 2-FRANK) 0.6 mg/0.1 mL (18 mg/3 mL) PnIj 11    Si.6 mg once daily for two weeks; increase by 0.6 mg daily at weekly intervals to a target dose of 3 mg once daily.    Class: Normal           Verify that the below list of medications is an accurate representation of the medications you are currently taking.  If none reported, the list may be blank. If incorrect, please contact your healthcare provider. Carry this list with you in case of emergency.           Current Medications     alprazolam (XANAX) 0.25 MG tablet Take 0.25 mg by mouth nightly as needed.     aspirin 81 mg Tab Take 1 tablet by mouth once daily.     atorvastatin (LIPITOR) 40 MG tablet TAKE ONE TABLET BY MOUTH ONCE DAILY    blood-glucose meter (ACCU-CHEK DUGLAS) Misc Check blood sugar once daily, accu-check duglas glucometer kit with fastclix lancet device    lancets (ACCU-CHEK FASTCLIX) Misc Check sugar once daily, fastclix lancet drum    lisinopril (PRINIVIL,ZESTRIL) 2.5 MG tablet TAKE ONE TABLET BY MOUTH ONCE DAILY    metformin (GLUCOPHAGE-XR) 500 MG 24 hr tablet Take two tablets in the morning and one tablet in the evening.    metoprolol succinate (TOPROL-XL) 25 MG 24 hr tablet Take 1 tablet (25 mg total) by mouth once daily.    liraglutide 0.6 mg/0.1 mL, 18 mg/3 mL, subq PNIJ (VICTOZA 2-FRANK) 0.6 mg/0.1 mL (18 mg/3 mL) PnIj 0.6 mg once daily for two weeks; increase by 0.6 mg daily at weekly intervals to a target dose of 3 mg once daily.           Clinical Reference Information           Your Vitals Were     BP Pulse Temp Height Weight BMI    104/66 (BP Location: Right arm, Patient Position: Sitting, BP Method: Automatic) 79 97.8 °F (36.6 °C) (Oral) 5' 1" (1.549 m) 79.9 kg (176 lb 2.4 oz) 33.28 kg/m2      Blood Pressure          Most Recent Value "    BP  104/66      Allergies as of 4/21/2017     Niacin      Immunizations Administered on Date of Encounter - 4/21/2017     None      Orders Placed During Today's Visit      Normal Orders This Visit    Ambulatory consult to Diabetic Education       Instructions      Liraglutide injection  What is this medicine?  LIRAGLUTIDE (LIR a GLOO tide) is used to improve blood sugar control in adults with type 2 diabetes. This medicine may be used with other oral diabetes medicines.  How should I use this medicine?  This medicine is for injection under the skin of your upper leg, stomach area, or upper arm. You will be taught how to prepare and give this medicine. Use exactly as directed. Take your medicine at regular intervals. Do not take it more often than directed.  It is important that you put your used needles and syringes in a special sharps container. Do not put them in a trash can. If you do not have a sharps container, call your pharmacist or healthcare provider to get one.  A special MedGuide will be given to you by the pharmacist with each prescription and refill. Be sure to read this information carefully each time.  Talk to your pediatrician regarding the use of this medicine in children. Special care may be needed.  What side effects may I notice from receiving this medicine?  Side effects that you should report to your doctor or health care professional as soon as possible:  · allergic reactions like skin rash, itching or hives, swelling of the face, lips, or tongue  · breathing problems  · fever, chills  · loss of appetite  · signs and symptoms of low blood sugar such as feeling anxious, confusion, dizziness, increased hunger, unusually weak or tired, sweating, shakiness, cold, irritable, headache, blurred vision, fast heartbeat, loss of consciousness  · trouble passing urine or change in the amount of urine  · unusual stomach pain or upset  · vomiting  Side effects that usually do not require medical  attention (Report these to your doctor or health care professional if they continue or are bothersome.):  · constipation  · diarrhea  · fatigue  · headache  · nausea  What may interact with this medicine?  · acetaminophen  · atorvastatin  · birth control pills  · digoxin  · griseofulvin  · lisinoprilMany medications may cause changes in blood sugar, these include:  · alcohol containing beverages  · aspirin and aspirin-like drugs  · chloramphenicol  · chromium  · diuretics  · female hormones, such as estrogens or progestins, birth control pills  · heart medicines  · isoniazid  · male hormones or anabolic steroids  · medications for weight loss  · medicines for allergies, asthma, cold, or cough  · medicines for mental problems  · medicines called MAO inhibitors - Nardil, Parnate, Marplan, Eldepryl  · niacin  · NSAIDS, such as ibuprofen  · pentamidine  · phenytoin  · probenecid  · quinolone antibiotics such as ciprofloxacin, levofloxacin, ofloxacin  · some herbal dietary supplements  · steroid medicines such as prednisone or cortisone  · thyroid hormonesSome medications can hide the warning symptoms of low blood sugar (hypoglycemia). You may need to monitor your blood sugar more closely if you are taking one of these medications. These include:  · beta-blockers, often used for high blood pressure or heart problems (examples include atenolol, metoprolol, propranolol)  · clonidine  · guanethidine  · reserpine  What if I miss a dose?  If you miss a dose, take it as soon as you can. If it is almost time for your next dose, take only that dose. Do not take double or extra doses.  Where should I keep my medicine?  Keep out of the reach of children.  Store unopened pen in a refrigerator between 2 and 8 degrees C (36 and 46 degrees F). Do not freeze or use if the medicine has been frozen. Protect from light and excessive heat. After you first use the pen, it can be stored at room temperature between 15 and 30 degrees C (59 and  86 degrees F) or in a refrigerator. Throw away your used pen after 30 days or after the expiration date, whichever comes first.  Do not store your pen with the needle attached. If the needle is left on, medicine may leak from the pen.  What should I tell my health care provider before I take this medicine?  They need to know if you have any of these conditions:  · endocrine tumors (MEN 2) or if someone in your family had these tumors  · gallstones  · high cholesterol  · history of alcohol abuse problem  · history of pancreatitis  · kidney disease or if you are on dialysis  · liver disease  · previous swelling of the tongue, face, or lips with difficulty breathing, difficulty swallowing, hoarseness, or tightening of the throat  · stomach problems  · suicidal thoughts, plans, or attempt; a previous suicide attempt by you or a family member  · thyroid cancer or if someone in your family had thyroid cancer  · an unusual or allergic reaction to liraglutide, medicines, foods, dyes, or preservatives  · pregnant or trying to get pregnant  · breast-feeding  What should I watch for while using this medicine?  Visit your doctor or health care professional for regular checks on your progress.  A test called the HbA1C (A1C) will be monitored. This is a simple blood test. It measures your blood sugar control over the last 2 to 3 months. You will receive this test every 3 to 6 months.  Learn how to check your blood sugar. Learn the symptoms of low and high blood sugar and how to manage them.  Always carry a quick-source of sugar with you in case you have symptoms of low blood sugar. Examples include hard sugar candy or glucose tablets. Make sure others know that you can choke if you eat or drink when you develop serious symptoms of low blood sugar, such as seizures or unconsciousness. They must get medical help at once.  Tell your doctor or health care professional if you have high blood sugar. You might need to change the dose of  your medicine. If you are sick or exercising more than usual, you might need to change the dose of your medicine.  Do not skip meals. Ask your doctor or health care professional if you should avoid alcohol. Many nonprescription cough and cold products contain sugar or alcohol. These can affect blood sugar.  Liraglutide pens and cartridges should never be shared. Even if the needle is changed, sharing may result in passing of viruses like hepatitis or HIV.  Wear a medical ID bracelet or chain, and carry a card that describes your disease and details of your medicine and dosage times.  Patients and their families should watch out for worsening depression or thoughts of suicide. Also watch out for sudden changes in feelings such as feeling anxious, agitated, panicky, irritable, hostile, aggressive, impulsive, severely restless, overly excited and hyperactive, or not being able to sleep. If this happens, especially at the beginning of treatment or after a change in dose, call your health care professional.  Date Last Reviewed:   NOTE:This sheet is a summary. It may not cover all possible information. If you have questions about this medicine, talk to your doctor, pharmacist, or health care provider. Copyright© 2016 [employer]      Use victoza 0.6 mg subQ for two weeks before titrating dose up       Language Assistance Services     ATTENTION: Language assistance services are available, free of charge. Please call [phone].      ATENCIÓN: Si habla español, tiene a brock disposición servicios gratuitos de asistencia lingüística. Llame al [phone].     [facility] Ý: N?u b?n nói Ti?ng Vi?t, có các d?ch v? h? tr? ngôn ng? mi?n phí dành cho b?n. G?i s? [phone].         [facility] complies with applicable Federal civil rights laws and does not discriminate on the basis of race, color, national origin, age, disability, or sex.

## 2017-04-21 NOTE — PROGRESS NOTES
Subjective:       Patient ID: Aleksandra Keys is a 61 y.o. male.    Chief Complaint: Diabetes    HPI Comments: Patient here for diabetes and obesity follow up.  He is interested in starting Victoza for weight loss.  Increasing metformin has decreased morning blood sugars to 120's-130's.  He states he has noted less fluctuation in blood sugars as well.      Diabetes   He has type 2 diabetes mellitus. No MedicAlert identification noted. The initial diagnosis of diabetes was made 5 years ago. Hypoglycemia symptoms include nervousness/anxiousness and sweats. Pertinent negatives for hypoglycemia include no confusion, dizziness, headaches, hunger, mood changes, pallor, seizures, sleepiness or speech difficulty. Pertinent negatives for diabetes include no blurred vision, no chest pain, no fatigue, no foot paresthesias, no foot ulcerations, no polydipsia, no polyphagia, no polyuria, no visual change, no weakness and no weight loss. Pertinent negatives for hypoglycemia complications include no blackouts, no hospitalization, no nocturnal hypoglycemia, no required assistance and no required glucagon injection. Symptoms are stable. Pertinent negatives for diabetic complications include no autonomic neuropathy, CVA, heart disease, impotence, nephropathy, peripheral neuropathy, PVD or retinopathy. Risk factors for coronary artery disease include family history. Current diabetic treatment includes oral agent (monotherapy). He is compliant with treatment most of the time. His weight is fluctuating minimally. He is following a generally healthy diet. Meal planning includes calorie counting. He has not had a previous visit with a dietitian. He participates in exercise daily. He monitors blood glucose at home 1-2 x per day. Blood glucose monitoring compliance is good. His home blood glucose trend is fluctuating minimally. He does not see a podiatrist.Eye exam is current.     Review of Systems   Constitutional: Negative for fatigue and  weight loss.   Eyes: Negative for blurred vision and visual disturbance.   Respiratory: Negative for cough and shortness of breath.    Cardiovascular: Negative for chest pain.   Endocrine: Negative for polydipsia, polyphagia and polyuria.   Genitourinary: Negative for impotence.   Skin: Negative for pallor.   Neurological: Negative for dizziness, seizures, speech difficulty, weakness and headaches.   Psychiatric/Behavioral: Negative for confusion. The patient is nervous/anxious.        Objective:      Physical Exam   Constitutional: He is oriented to person, place, and time. He appears well-developed and well-nourished. No distress.   HENT:   Head: Normocephalic and atraumatic.   Right Ear: External ear normal.   Left Ear: External ear normal.   Mouth/Throat: Oropharynx is clear and moist. No oropharyngeal exudate.   Eyes: Pupils are equal, round, and reactive to light. Right eye exhibits no discharge. Left eye exhibits no discharge.   Neck: Neck supple. No thyromegaly present.   Cardiovascular: Normal rate and regular rhythm.  Exam reveals no gallop and no friction rub.    No murmur heard.  Pulmonary/Chest: Effort normal and breath sounds normal. No respiratory distress. He has no wheezes. He has no rales.   Abdominal: Soft. He exhibits no distension. There is no tenderness.   Lymphadenopathy:     He has no cervical adenopathy.   Neurological: He is alert and oriented to person, place, and time. Coordination normal.   Skin: Skin is warm and dry.   Psychiatric: He has a normal mood and affect. His behavior is normal. Thought content normal.   Vitals reviewed.          Current Outpatient Prescriptions:     alprazolam (XANAX) 0.25 MG tablet, Take 0.25 mg by mouth nightly as needed. , Disp: , Rfl: 5    aspirin 81 mg Tab, Take 1 tablet by mouth once daily. , Disp: , Rfl:     atorvastatin (LIPITOR) 40 MG tablet, TAKE ONE TABLET BY MOUTH ONCE DAILY, Disp: 30 tablet, Rfl: 9    blood-glucose meter (ACCU-CHEK DUGLAS) Misc,  Check blood sugar once daily, accu-check felton glucometer kit with fastclix lancet device, Disp: 1 each, Rfl: 0    lancets (ACCU-CHEK FASTCLIX) Misc, Check sugar once daily, fastclix lancet drum, Disp: 50 each, Rfl: 11    lisinopril (PRINIVIL,ZESTRIL) 2.5 MG tablet, TAKE ONE TABLET BY MOUTH ONCE DAILY, Disp: 30 tablet, Rfl: 11    metformin (GLUCOPHAGE-XR) 500 MG 24 hr tablet, Take two tablets in the morning and one tablet in the evening., Disp: 90 tablet, Rfl: 3    metoprolol succinate (TOPROL-XL) 25 MG 24 hr tablet, Take 1 tablet (25 mg total) by mouth once daily., Disp: 30 tablet, Rfl: 11    liraglutide 0.6 mg/0.1 mL, 18 mg/3 mL, subq PNIJ (VICTOZA 2-FRANK) 0.6 mg/0.1 mL (18 mg/3 mL) PnIj, 0.6 mg once daily for two weeks; increase by 0.6 mg daily at weekly intervals to a target dose of 3 mg once daily., Disp: 3 mL, Rfl: 11  Assessment:       1. Obesity, unspecified obesity severity, unspecified obesity type    2. Controlled type 2 diabetes mellitus without complication, without long-term current use of insulin    3. Hypertension associated with diabetes        Plan:     Obesity, unspecified obesity severity, unspecified obesity type  Call for nausea, vomiting, rash, palpitations with Victoza.  Handout given with common side effects.  RTC 3 months.  -     Discontinue: liraglutide 0.6 mg/0.1 mL, 18 mg/3 mL, subq PNIJ (VICTOZA 2-FRANK) 0.6 mg/0.1 mL (18 mg/3 mL) PnIj; 0.6 mg once daily for one week; increase by 0.6 mg daily at weekly intervals to a target dose of 3 mg once daily.  Dispense: 3 mL; Refill: 11  -     liraglutide 0.6 mg/0.1 mL, 18 mg/3 mL, subq PNIJ (VICTOZA 2-FRANK) 0.6 mg/0.1 mL (18 mg/3 mL) PnIj; 0.6 mg once daily for two weeks; increase by 0.6 mg daily at weekly intervals to a target dose of 3 mg once daily.  Dispense: 3 mL; Refill: 11  -     Ambulatory consult to Diabetic Education    Controlled type 2 diabetes mellitus without complication, without long-term current use of insulin  Improved.   Continue to monitor blood sugars.  RTC 3 mos, will need labs at that time.  -     Discontinue: liraglutide 0.6 mg/0.1 mL, 18 mg/3 mL, subq PNIJ (VICTOZA 2-FRANK) 0.6 mg/0.1 mL (18 mg/3 mL) PnIj; 0.6 mg once daily for one week; increase by 0.6 mg daily at weekly intervals to a target dose of 3 mg once daily.  Dispense: 3 mL; Refill: 11  -     liraglutide 0.6 mg/0.1 mL, 18 mg/3 mL, subq PNIJ (VICTOZA 2-FRANK) 0.6 mg/0.1 mL (18 mg/3 mL) PnIj; 0.6 mg once daily for two weeks; increase by 0.6 mg daily at weekly intervals to a target dose of 3 mg once daily.  Dispense: 3 mL; Refill: 11  -     Ambulatory consult to Diabetic Education    Hypertension associated with diabetes  Stable on current medication.  Patient readiness: acceptance and barriers:none    During the course of the visit the patient was educated and counseled about the following:     Diabetes:  Discussed general issues about diabetes pathophysiology and management.  Addressed ADA diet.  Hypertension:   Medication: begin victoza.  Dietary sodium restriction.  Regular aerobic exercise.  Obesity:   General weight loss/lifestyle modification strategies discussed (elicit support from others; identify saboteurs; non-food rewards, etc).    Goals: Diabetes: Maintain Hemoglobin A1C below 7, Hypertension: Reduce Blood Pressure and Obesity: Reduce calorie intake and BMI    Did patient meet goals/outcomes: Yes    The following self management tools provided: declined    Patient Instructions (the written plan) was given to the patient/family.     Time spent with patient: 15 minutes

## 2017-04-21 NOTE — PATIENT INSTRUCTIONS
Liraglutide injection  What is this medicine?  LIRAGLUTIDE (LIR a GLOO tide) is used to improve blood sugar control in adults with type 2 diabetes. This medicine may be used with other oral diabetes medicines.  How should I use this medicine?  This medicine is for injection under the skin of your upper leg, stomach area, or upper arm. You will be taught how to prepare and give this medicine. Use exactly as directed. Take your medicine at regular intervals. Do not take it more often than directed.  It is important that you put your used needles and syringes in a special sharps container. Do not put them in a trash can. If you do not have a sharps container, call your pharmacist or healthcare provider to get one.  A special MedGuide will be given to you by the pharmacist with each prescription and refill. Be sure to read this information carefully each time.  Talk to your pediatrician regarding the use of this medicine in children. Special care may be needed.  What side effects may I notice from receiving this medicine?  Side effects that you should report to your doctor or health care professional as soon as possible:  · allergic reactions like skin rash, itching or hives, swelling of the face, lips, or tongue  · breathing problems  · fever, chills  · loss of appetite  · signs and symptoms of low blood sugar such as feeling anxious, confusion, dizziness, increased hunger, unusually weak or tired, sweating, shakiness, cold, irritable, headache, blurred vision, fast heartbeat, loss of consciousness  · trouble passing urine or change in the amount of urine  · unusual stomach pain or upset  · vomiting  Side effects that usually do not require medical attention (Report these to your doctor or health care professional if they continue or are bothersome.):  · constipation  · diarrhea  · fatigue  · headache  · nausea  What may interact with this medicine?  · acetaminophen  · atorvastatin  · birth control  pills  · digoxin  · griseofulvin  · lisinoprilMany medications may cause changes in blood sugar, these include:  · alcohol containing beverages  · aspirin and aspirin-like drugs  · chloramphenicol  · chromium  · diuretics  · female hormones, such as estrogens or progestins, birth control pills  · heart medicines  · isoniazid  · male hormones or anabolic steroids  · medications for weight loss  · medicines for allergies, asthma, cold, or cough  · medicines for mental problems  · medicines called MAO inhibitors - Nardil, Parnate, Marplan, Eldepryl  · niacin  · NSAIDS, such as ibuprofen  · pentamidine  · phenytoin  · probenecid  · quinolone antibiotics such as ciprofloxacin, levofloxacin, ofloxacin  · some herbal dietary supplements  · steroid medicines such as prednisone or cortisone  · thyroid hormonesSome medications can hide the warning symptoms of low blood sugar (hypoglycemia). You may need to monitor your blood sugar more closely if you are taking one of these medications. These include:  · beta-blockers, often used for high blood pressure or heart problems (examples include atenolol, metoprolol, propranolol)  · clonidine  · guanethidine  · reserpine  What if I miss a dose?  If you miss a dose, take it as soon as you can. If it is almost time for your next dose, take only that dose. Do not take double or extra doses.  Where should I keep my medicine?  Keep out of the reach of children.  Store unopened pen in a refrigerator between 2 and 8 degrees C (36 and 46 degrees F). Do not freeze or use if the medicine has been frozen. Protect from light and excessive heat. After you first use the pen, it can be stored at room temperature between 15 and 30 degrees C (59 and 86 degrees F) or in a refrigerator. Throw away your used pen after 30 days or after the expiration date, whichever comes first.  Do not store your pen with the needle attached. If the needle is left on, medicine may leak from the pen.  What should I tell  my health care provider before I take this medicine?  They need to know if you have any of these conditions:  · endocrine tumors (MEN 2) or if someone in your family had these tumors  · gallstones  · high cholesterol  · history of alcohol abuse problem  · history of pancreatitis  · kidney disease or if you are on dialysis  · liver disease  · previous swelling of the tongue, face, or lips with difficulty breathing, difficulty swallowing, hoarseness, or tightening of the throat  · stomach problems  · suicidal thoughts, plans, or attempt; a previous suicide attempt by you or a family member  · thyroid cancer or if someone in your family had thyroid cancer  · an unusual or allergic reaction to liraglutide, medicines, foods, dyes, or preservatives  · pregnant or trying to get pregnant  · breast-feeding  What should I watch for while using this medicine?  Visit your doctor or health care professional for regular checks on your progress.  A test called the HbA1C (A1C) will be monitored. This is a simple blood test. It measures your blood sugar control over the last 2 to 3 months. You will receive this test every 3 to 6 months.  Learn how to check your blood sugar. Learn the symptoms of low and high blood sugar and how to manage them.  Always carry a quick-source of sugar with you in case you have symptoms of low blood sugar. Examples include hard sugar candy or glucose tablets. Make sure others know that you can choke if you eat or drink when you develop serious symptoms of low blood sugar, such as seizures or unconsciousness. They must get medical help at once.  Tell your doctor or health care professional if you have high blood sugar. You might need to change the dose of your medicine. If you are sick or exercising more than usual, you might need to change the dose of your medicine.  Do not skip meals. Ask your doctor or health care professional if you should avoid alcohol. Many nonprescription cough and cold products  contain sugar or alcohol. These can affect blood sugar.  Liraglutide pens and cartridges should never be shared. Even if the needle is changed, sharing may result in passing of viruses like hepatitis or HIV.  Wear a medical ID bracelet or chain, and carry a card that describes your disease and details of your medicine and dosage times.  Patients and their families should watch out for worsening depression or thoughts of suicide. Also watch out for sudden changes in feelings such as feeling anxious, agitated, panicky, irritable, hostile, aggressive, impulsive, severely restless, overly excited and hyperactive, or not being able to sleep. If this happens, especially at the beginning of treatment or after a change in dose, call your health care professional.  Date Last Reviewed:   NOTE:This sheet is a summary. It may not cover all possible information. If you have questions about this medicine, talk to your doctor, pharmacist, or health care provider. Copyright© 2016 [employer]      Use victoza 0.6 mg subQ for two weeks before titrating dose up

## 2017-05-09 ENCOUNTER — CLINICAL SUPPORT (OUTPATIENT)
Dept: DIABETES | Facility: CLINIC | Age: 62
End: 2017-05-09
Payer: COMMERCIAL

## 2017-05-09 VITALS — BODY MASS INDEX: 33.23 KG/M2 | WEIGHT: 176 LBS | HEIGHT: 61 IN

## 2017-05-09 DIAGNOSIS — E11.9 CONTROLLED TYPE 2 DIABETES MELLITUS WITHOUT COMPLICATION, WITHOUT LONG-TERM CURRENT USE OF INSULIN: ICD-10-CM

## 2017-05-09 DIAGNOSIS — E11.9 TYPE 2 DIABETES MELLITUS WITHOUT COMPLICATION, WITH LONG-TERM CURRENT USE OF INSULIN: Primary | ICD-10-CM

## 2017-05-09 DIAGNOSIS — Z79.4 TYPE 2 DIABETES MELLITUS WITHOUT COMPLICATION, WITH LONG-TERM CURRENT USE OF INSULIN: Primary | ICD-10-CM

## 2017-05-09 DIAGNOSIS — E66.9 OBESITY, UNSPECIFIED OBESITY SEVERITY, UNSPECIFIED OBESITY TYPE: ICD-10-CM

## 2017-05-09 PROCEDURE — G0108 DIAB MANAGE TRN  PER INDIV: HCPCS | Mod: S$GLB,,, | Performed by: DIETITIAN, REGISTERED

## 2017-05-09 PROCEDURE — 99999 PR PBB SHADOW E&M-EST. PATIENT-LVL II: CPT | Mod: PBBFAC,,,

## 2017-05-09 RX ORDER — PEN NEEDLE, DIABETIC 30 GX3/16"
1 NEEDLE, DISPOSABLE MISCELLANEOUS DAILY
Qty: 100 EACH | Refills: 4 | Status: SHIPPED | OUTPATIENT
Start: 2017-05-09 | End: 2020-09-22

## 2017-05-09 NOTE — PROGRESS NOTES
"DIABETES EDUCATOR NOTE  VICTOZA PEN INSTRUCTIONS    REFERRING PROVIDER Lila Almodovar, MARGOP-TADEO   1955    The patient is here in clinic today for education on Victoza Pen injections. Patient was given background information on Victoza and how it works. Covered in detail how to use Victoza pen ( timing- when to take it, meal planning, storage, and pen replacement). Discussed what to expect: side effects, effects on weight, and blood sugar control.    Instructed that the starting dose is 0.6 mg daily for one week and then increased to 1.2 mg daily. If glycemic control does not improve on the 1.2 mg does, the provider may increase the dose to 1.8 mg a day.    Reviewed causes of hypoglycemia including signs, symptoms, and treatment options.    When a trend of hypoglycemia occurs, and the pt is on other oral diabetes medications (OHA, combination medications) stressed the importance of calling the clinic or referring provider immediately as a reduction in dose of this type of medication may be required.    The patient was allowed time to practice placing a pen needle on the test medium demonstration pen, performing a "First Time Use" flow check shot to purge air bubbles and dialing up a 0.6 mg starting dose. Pt. performed adequate return demonstration of subcutaneous mock injection of the test medium into an injection pillow without difficulty.    The patient was given a Victoza Patient Starter Kit, their prescription from referring provider and written instructions. Patient instructed to keep a blood sugar log, to SMBG b.id at altering times, and to bring the log to all clinic appointments. Patient verbalized understanding.    Time spent counseling patient: 30 minutes  "

## 2017-06-13 ENCOUNTER — TELEPHONE (OUTPATIENT)
Dept: FAMILY MEDICINE | Facility: CLINIC | Age: 62
End: 2017-06-13

## 2017-06-13 NOTE — TELEPHONE ENCOUNTER
Nothing in the records I have really suggests a urologic problem.  Is he having frequency, urgency, blood, dysuria?  ER records says pain in on the left but ER doc said he was tender on the right and his liver enzymes were up.  CT negative.  Could be hepatitis?  Could be colitis from antibiotics??  Have they done a stool for C.difficile?

## 2017-06-14 ENCOUNTER — DOCUMENTATION ONLY (OUTPATIENT)
Dept: FAMILY MEDICINE | Facility: CLINIC | Age: 62
End: 2017-06-14

## 2017-06-14 NOTE — PROGRESS NOTES
Pre-Visit Chart Review  For Appointment Scheduled on 6-15-17    Health Maintenance Due   Topic Date Due    Zoster Vaccine  11/13/2016    Foot Exam  06/27/2017

## 2017-06-15 ENCOUNTER — OFFICE VISIT (OUTPATIENT)
Dept: FAMILY MEDICINE | Facility: CLINIC | Age: 62
End: 2017-06-15
Payer: COMMERCIAL

## 2017-06-15 VITALS
HEIGHT: 61 IN | OXYGEN SATURATION: 97 % | RESPIRATION RATE: 16 BRPM | HEART RATE: 72 BPM | TEMPERATURE: 98 F | DIASTOLIC BLOOD PRESSURE: 66 MMHG | SYSTOLIC BLOOD PRESSURE: 108 MMHG | WEIGHT: 171.31 LBS | BODY MASS INDEX: 32.34 KG/M2

## 2017-06-15 DIAGNOSIS — E11.9 CONTROLLED TYPE 2 DIABETES MELLITUS WITHOUT COMPLICATION, WITHOUT LONG-TERM CURRENT USE OF INSULIN: ICD-10-CM

## 2017-06-15 DIAGNOSIS — R79.89 ELEVATED LIVER FUNCTION TESTS: ICD-10-CM

## 2017-06-15 DIAGNOSIS — R74.8 ELEVATED LIPASE: ICD-10-CM

## 2017-06-15 DIAGNOSIS — R10.9 ABDOMINAL PAIN, UNSPECIFIED LOCATION: Primary | ICD-10-CM

## 2017-06-15 DIAGNOSIS — K55.9 ISCHEMIC COLITIS: ICD-10-CM

## 2017-06-15 PROCEDURE — 4010F ACE/ARB THERAPY RXD/TAKEN: CPT | Mod: S$GLB,,, | Performed by: FAMILY MEDICINE

## 2017-06-15 PROCEDURE — 3044F HG A1C LEVEL LT 7.0%: CPT | Mod: S$GLB,,, | Performed by: FAMILY MEDICINE

## 2017-06-15 PROCEDURE — 99214 OFFICE O/P EST MOD 30 MIN: CPT | Mod: S$GLB,,, | Performed by: FAMILY MEDICINE

## 2017-06-15 PROCEDURE — 99999 PR PBB SHADOW E&M-EST. PATIENT-LVL IV: CPT | Mod: PBBFAC,,, | Performed by: FAMILY MEDICINE

## 2017-06-15 RX ORDER — METRONIDAZOLE 500 MG/1
TABLET ORAL
Refills: 0 | COMMUNITY
Start: 2017-05-26 | End: 2017-06-15 | Stop reason: ALTCHOICE

## 2017-06-15 RX ORDER — ALBUTEROL SULFATE 0.83 MG/ML
2.5 SOLUTION RESPIRATORY (INHALATION) DAILY PRN
Refills: 0 | COMMUNITY
Start: 2017-04-07 | End: 2018-04-19

## 2017-06-15 RX ORDER — HYDROCODONE BITARTRATE AND ACETAMINOPHEN 5; 325 MG/1; MG/1
1 TABLET ORAL EVERY 6 HOURS PRN
Refills: 0 | COMMUNITY
Start: 2017-06-05 | End: 2017-08-09

## 2017-06-15 NOTE — PROGRESS NOTES
Subjective:       Patient ID: Aleksandra Keys is a 61 y.o. male.    Chief Complaint: Hospital Follow Up    61-year-old male who underwent colonoscopy with Dr. Abbott on April 28, 2017.  Findings included a 20 cm polyp at the hepatic flexure that was removed intact by hot biopsy.  He had an additional 2 5-6 mm polyps in the rectum and sigmoid diverticulosis with small mouth diverticulum no evidence of abscess or perforation.  The patient reports that soon after the colonoscopy he began experiencing some pain in his left lower quadrant that felt very typical of his attacks of diverticulitis.  He called Dr. Abbott to report this and was given a course of Levaquin and Flagyl which the patient states did result in some improvement but not complete relief.  Symptoms continued after the antibiotics were completed and continued to worsen gradually at which time a second course of antibiotics consisting again of Levaquin and Flagyl although I believe he stated the Flagyl had been increased to more frequent dosings was given.  The patient states this did not result in any improvement at all even temporarily.  His symptoms continued to worsen with nausea low-grade fever diarrhea chills and anorexia and he presented to the emergency room at [facility] on June 5, 2017.  He underwent a workup including lab and CT of the abdomen with contrast.  Results demonstrated mild changes in liver enzymes and a slight increase in the lipase.  CT scan with contrast demonstrated no acute intra-abdominal disease a small hiatal hernia and moderate atherosclerotic changes in the aorta with no aneurysm.    Past Medical History:  No date: Anxiety  No date: Benign hypertension  No date: Diabetes mellitus, type 2  No date: Diverticulitis  No date: Fatty liver  No date: GERD (gastroesophageal reflux disease)  No date: Hyperlipidemia  No date: ITP (idiopathic thrombocytopenic purpura)  No date: Occlusive coronary artery disease    Past  "Surgical History:  04/11/2016: COLONOSCOPY      Comment: Dr. Abbott, multiple polyps, recheck 6 month  No date: CORONARY ARTERY BYPASS GRAFT  No date: EYE SURGERY  No date: SPLENECTOMY, TOTAL      Current Outpatient Prescriptions:     albuterol (PROVENTIL) 2.5 mg /3 mL (0.083 %) nebulizer solution, Take 2.5 mg by nebulization daily as needed. , Disp: , Rfl: 0    alprazolam (XANAX) 0.25 MG tablet, Take 0.25 mg by mouth nightly as needed. , Disp: , Rfl: 5    aspirin 81 mg Tab, Take 1 tablet by mouth once daily. , Disp: , Rfl:     atorvastatin (LIPITOR) 40 MG tablet, TAKE ONE TABLET BY MOUTH ONCE DAILY, Disp: 30 tablet, Rfl: 9    blood-glucose meter (ACCU-CHEK DUGLAS) Misc, Check blood sugar once daily, accu-check duglas glucometer kit with fastclix lancet device, Disp: 1 each, Rfl: 0    hydrocodone-acetaminophen 5-325mg (NORCO) 5-325 mg per tablet, Take 1 tablet by mouth every 6 (six) hours as needed. , Disp: , Rfl: 0    lancets (ACCU-CHEK FASTCLIX) Misc, Check sugar once daily, fastclix lancet drum, Disp: 50 each, Rfl: 11    lisinopril (PRINIVIL,ZESTRIL) 2.5 MG tablet, TAKE ONE TABLET BY MOUTH ONCE DAILY, Disp: 30 tablet, Rfl: 11    metformin (GLUCOPHAGE-XR) 500 MG 24 hr tablet, Take two tablets in the morning and one tablet in the evening., Disp: 90 tablet, Rfl: 3    metoprolol succinate (TOPROL-XL) 25 MG 24 hr tablet, Take 1 tablet (25 mg total) by mouth once daily., Disp: 30 tablet, Rfl: 11    pen needle, diabetic 32 gauge x 5/32" Ndle, 1 Device by Misc.(Non-Drug; Combo Route) route once daily., Disp: 100 each, Rfl: 4    liraglutide 0.6 mg/0.1 mL, 18 mg/3 mL, subq PNIJ (VICTOZA 2-FRANK) 0.6 mg/0.1 mL (18 mg/3 mL) PnIj, 0.6 mg once daily for two weeks; increase by 0.6 mg daily at weekly intervals to a target dose of 3 mg once daily., Disp: 3 mL, Rfl: 11          Review of Systems   Constitutional: Positive for fatigue and fever. Negative for chills (not currently).   Respiratory: Negative for chest " tightness and shortness of breath.    Cardiovascular: Negative for chest pain and palpitations.   Gastrointestinal: Positive for abdominal distention, abdominal pain, diarrhea and nausea. Negative for anal bleeding, blood in stool, constipation and vomiting.       Objective:      Physical Exam   Constitutional: He is oriented to person, place, and time. He appears well-developed. No distress.   Good blood pressure control  No tachycardia, afebrile  Patient is obese with bmi of 32.4.   Weight is decreased by 5lb since last visit of 6/27/16.     HENT:   Head: Normocephalic and atraumatic.   Eyes: EOM are normal. Pupils are equal, round, and reactive to light. No scleral icterus.   Cardiovascular: Normal rate, regular rhythm and normal heart sounds.  Exam reveals no gallop and no friction rub.    No murmur heard.  Pulmonary/Chest: Effort normal and breath sounds normal. No respiratory distress. He has no wheezes. He has no rales. He exhibits no tenderness.   Abdominal: He exhibits distension. He exhibits no shifting dullness, no pulsatile liver, no fluid wave, no abdominal bruit, no ascites, no pulsatile midline mass and no mass. Bowel sounds are decreased. There is no hepatosplenomegaly. There is tenderness in the right lower quadrant, left upper quadrant and left lower quadrant. There is no rigidity, no rebound, no guarding, no CVA tenderness and negative Lira's sign.       Bowel sounds are tinkling in nature   Neurological: He is alert and oriented to person, place, and time.   Skin: He is not diaphoretic.   Psychiatric: He has a normal mood and affect. His behavior is normal. Judgment and thought content normal.   Nursing note and vitals reviewed.      Assessment:       1. Abdominal pain, unspecified location    2. Ischemic colitis    3. Controlled type 2 diabetes mellitus without complication, without long-term current use of insulin    4. Elevated liver function tests    5. Elevated lipase        Plan:       1.  Abdominal pain, unspecified location  Negative CT scan other than diverticulosis with no evidence diverticulitis and evidence ASCVD.  No bowel wall ischemia or edema seen.  Patient is diabetic, possible ischemic etiology-possible trigger colonoscopy and/or prep?    2. Ischemic colitis  - US Mesenteric Ischemia Study (xpd); Future  - Comprehensive metabolic panel; Future  - Amylase; Future  - Lipase; Future  - CBC auto differential; Future    3. Controlled type 2 diabetes mellitus without complication, without long-term current use of insulin  Lab Results   Component Value Date    HGBA1C 6.6 (H) 01/20/2017     - Comprehensive metabolic panel; Future  - Hemoglobin A1c; Future  - Lipid panel; Future    4. Elevated liver function tests  Mild, will recheck as above    5. Elevated lipase  Will recheck as above

## 2017-06-15 NOTE — PATIENT INSTRUCTIONS

## 2017-06-20 ENCOUNTER — HOSPITAL ENCOUNTER (OUTPATIENT)
Dept: RADIOLOGY | Facility: CLINIC | Age: 62
Discharge: HOME | End: 2017-06-20
Attending: FAMILY MEDICINE
Payer: COMMERCIAL

## 2017-06-20 ENCOUNTER — LAB VISIT (OUTPATIENT)
Dept: LAB | Facility: HOSPITAL | Age: 62
End: 2017-06-20
Attending: FAMILY MEDICINE
Payer: COMMERCIAL

## 2017-06-20 DIAGNOSIS — K55.9 ISCHEMIC COLITIS: ICD-10-CM

## 2017-06-20 DIAGNOSIS — E11.9 CONTROLLED TYPE 2 DIABETES MELLITUS WITHOUT COMPLICATION, WITHOUT LONG-TERM CURRENT USE OF INSULIN: ICD-10-CM

## 2017-06-20 LAB
ALBUMIN SERPL BCP-MCNC: 3.8 G/DL
ALP SERPL-CCNC: 67 U/L
ALT SERPL W/O P-5'-P-CCNC: 38 U/L
AMYLASE SERPL-CCNC: 70 U/L
ANION GAP SERPL CALC-SCNC: 7 MMOL/L
AST SERPL-CCNC: 24 U/L
BASOPHILS # BLD AUTO: 0.04 K/UL
BASOPHILS NFR BLD: 0.5 %
BILIRUB SERPL-MCNC: 1.3 MG/DL
BUN SERPL-MCNC: 13 MG/DL
CALCIUM SERPL-MCNC: 9.1 MG/DL
CHLORIDE SERPL-SCNC: 104 MMOL/L
CHOLEST/HDLC SERPL: 4.3 {RATIO}
CO2 SERPL-SCNC: 29 MMOL/L
CREAT SERPL-MCNC: 0.9 MG/DL
DIFFERENTIAL METHOD: ABNORMAL
EOSINOPHIL # BLD AUTO: 0.3 K/UL
EOSINOPHIL NFR BLD: 4.5 %
ERYTHROCYTE [DISTWIDTH] IN BLOOD BY AUTOMATED COUNT: 12.9 %
EST. GFR  (AFRICAN AMERICAN): >60 ML/MIN/1.73 M^2
EST. GFR  (NON AFRICAN AMERICAN): >60 ML/MIN/1.73 M^2
GLUCOSE SERPL-MCNC: 130 MG/DL
HCT VFR BLD AUTO: 49.4 %
HDL/CHOLESTEROL RATIO: 23.4 %
HDLC SERPL-MCNC: 154 MG/DL
HDLC SERPL-MCNC: 36 MG/DL
HGB BLD-MCNC: 16.6 G/DL
LDLC SERPL CALC-MCNC: 70.8 MG/DL
LIPASE SERPL-CCNC: 159 U/L
LYMPHOCYTES # BLD AUTO: 3 K/UL
LYMPHOCYTES NFR BLD: 40.5 %
MCH RBC QN AUTO: 32.9 PG
MCHC RBC AUTO-ENTMCNC: 33.6 %
MCV RBC AUTO: 98 FL
MONOCYTES # BLD AUTO: 0.9 K/UL
MONOCYTES NFR BLD: 12.4 %
NEUTROPHILS # BLD AUTO: 3.1 K/UL
NEUTROPHILS NFR BLD: 41.7 %
NONHDLC SERPL-MCNC: 118 MG/DL
PLATELET # BLD AUTO: 119 K/UL
PMV BLD AUTO: 10.9 FL
POTASSIUM SERPL-SCNC: 4.2 MMOL/L
PROT SERPL-MCNC: 6.8 G/DL
RBC # BLD AUTO: 5.04 M/UL
SODIUM SERPL-SCNC: 140 MMOL/L
TRIGL SERPL-MCNC: 236 MG/DL
WBC # BLD AUTO: 7.5 K/UL

## 2017-06-20 PROCEDURE — 83690 ASSAY OF LIPASE: CPT

## 2017-06-20 PROCEDURE — 80053 COMPREHEN METABOLIC PANEL: CPT

## 2017-06-20 PROCEDURE — 83036 HEMOGLOBIN GLYCOSYLATED A1C: CPT

## 2017-06-20 PROCEDURE — 76775 US EXAM ABDO BACK WALL LIM: CPT | Mod: 26,,, | Performed by: RADIOLOGY

## 2017-06-20 PROCEDURE — 93976 VASCULAR STUDY: CPT | Mod: TC,PO

## 2017-06-20 PROCEDURE — 80061 LIPID PANEL: CPT

## 2017-06-20 PROCEDURE — 93976 VASCULAR STUDY: CPT | Mod: 26,,, | Performed by: RADIOLOGY

## 2017-06-20 PROCEDURE — 85025 COMPLETE CBC W/AUTO DIFF WBC: CPT

## 2017-06-20 PROCEDURE — 36415 COLL VENOUS BLD VENIPUNCTURE: CPT | Mod: PO

## 2017-06-20 PROCEDURE — 82150 ASSAY OF AMYLASE: CPT

## 2017-06-21 LAB
ESTIMATED AVG GLUCOSE: 131 MG/DL
HBA1C MFR BLD HPLC: 6.2 %

## 2017-06-24 DIAGNOSIS — E78.5 HYPERLIPIDEMIA: ICD-10-CM

## 2017-06-25 RX ORDER — ATORVASTATIN CALCIUM 40 MG/1
TABLET, FILM COATED ORAL
Qty: 30 TABLET | Refills: 11 | Status: SHIPPED | OUTPATIENT
Start: 2017-06-25 | End: 2018-03-12 | Stop reason: SDUPTHER

## 2017-06-26 RX ORDER — METFORMIN HYDROCHLORIDE 500 MG/1
TABLET, EXTENDED RELEASE ORAL
Qty: 90 TABLET | Refills: 0 | Status: SHIPPED | OUTPATIENT
Start: 2017-06-26 | End: 2017-07-26 | Stop reason: SDUPTHER

## 2017-07-19 ENCOUNTER — DOCUMENTATION ONLY (OUTPATIENT)
Dept: FAMILY MEDICINE | Facility: CLINIC | Age: 62
End: 2017-07-19

## 2017-07-19 NOTE — PROGRESS NOTES
Pre-Visit Chart Review  For Appointment Scheduled on 7/21/17    Health Maintenance Due   Topic Date Due    Zoster Vaccine  11/13/2016    Foot Exam  06/27/2017

## 2017-07-26 RX ORDER — LISINOPRIL 2.5 MG/1
TABLET ORAL
Qty: 30 TABLET | Refills: 10 | Status: SHIPPED | OUTPATIENT
Start: 2017-07-26 | End: 2018-03-12 | Stop reason: SDUPTHER

## 2017-07-26 RX ORDER — METFORMIN HYDROCHLORIDE 500 MG/1
TABLET, EXTENDED RELEASE ORAL
Qty: 90 TABLET | Refills: 0 | Status: SHIPPED | OUTPATIENT
Start: 2017-07-26 | End: 2017-08-27 | Stop reason: SDUPTHER

## 2017-08-28 RX ORDER — METFORMIN HYDROCHLORIDE 500 MG/1
TABLET, EXTENDED RELEASE ORAL
Qty: 90 TABLET | Refills: 0 | Status: SHIPPED | OUTPATIENT
Start: 2017-08-28 | End: 2017-10-24 | Stop reason: SDUPTHER

## 2017-10-24 RX ORDER — METFORMIN HYDROCHLORIDE 500 MG/1
TABLET, EXTENDED RELEASE ORAL
Qty: 270 TABLET | Refills: 0 | Status: SHIPPED | OUTPATIENT
Start: 2017-10-24 | End: 2018-03-12 | Stop reason: SDUPTHER

## 2017-10-25 ENCOUNTER — PATIENT MESSAGE (OUTPATIENT)
Dept: FAMILY MEDICINE | Facility: CLINIC | Age: 62
End: 2017-10-25

## 2017-11-29 ENCOUNTER — DOCUMENTATION ONLY (OUTPATIENT)
Dept: FAMILY MEDICINE | Facility: CLINIC | Age: 62
End: 2017-11-29

## 2017-11-29 NOTE — PROGRESS NOTES
Pre-Visit Chart Review  For Appointment Scheduled on 12-6-17    Health Maintenance Due   Topic Date Due    Zoster Vaccine  11/13/2016    Foot Exam  06/27/2017    Influenza Vaccine  08/01/2017    Eye Exam  12/20/2017

## 2018-02-01 DIAGNOSIS — E11.9 TYPE 2 DIABETES MELLITUS WITHOUT COMPLICATION: ICD-10-CM

## 2018-03-06 RX ORDER — METFORMIN HYDROCHLORIDE 500 MG/1
TABLET, EXTENDED RELEASE ORAL
Qty: 270 TABLET | Refills: 0 | OUTPATIENT
Start: 2018-03-06

## 2018-03-09 ENCOUNTER — DOCUMENTATION ONLY (OUTPATIENT)
Dept: FAMILY MEDICINE | Facility: CLINIC | Age: 63
End: 2018-03-09

## 2018-03-09 NOTE — PROGRESS NOTES
Health Maintenance Due   Topic Date Due    Zoster Vaccine  11/13/2016    Foot Exam  06/27/2017    Influenza Vaccine  08/01/2017    Hemoglobin A1c  12/20/2017    Eye Exam  12/20/2017

## 2018-03-12 ENCOUNTER — OFFICE VISIT (OUTPATIENT)
Dept: FAMILY MEDICINE | Facility: CLINIC | Age: 63
End: 2018-03-12
Payer: COMMERCIAL

## 2018-03-12 VITALS
TEMPERATURE: 98 F | SYSTOLIC BLOOD PRESSURE: 138 MMHG | BODY MASS INDEX: 32.67 KG/M2 | DIASTOLIC BLOOD PRESSURE: 72 MMHG | HEIGHT: 61 IN | WEIGHT: 173.06 LBS | HEART RATE: 41 BPM | OXYGEN SATURATION: 96 %

## 2018-03-12 DIAGNOSIS — Z23 FLU VACCINE NEED: Primary | ICD-10-CM

## 2018-03-12 DIAGNOSIS — E11.59 HYPERTENSION ASSOCIATED WITH DIABETES: ICD-10-CM

## 2018-03-12 DIAGNOSIS — E78.49 OTHER HYPERLIPIDEMIA: ICD-10-CM

## 2018-03-12 DIAGNOSIS — I10 GOOD HYPERTENSION CONTROL: ICD-10-CM

## 2018-03-12 DIAGNOSIS — E78.5 HYPERLIPIDEMIA ASSOCIATED WITH TYPE 2 DIABETES MELLITUS: ICD-10-CM

## 2018-03-12 DIAGNOSIS — E11.69 HYPERLIPIDEMIA ASSOCIATED WITH TYPE 2 DIABETES MELLITUS: ICD-10-CM

## 2018-03-12 DIAGNOSIS — E11.9 CONTROLLED TYPE 2 DIABETES MELLITUS WITHOUT COMPLICATION, WITHOUT LONG-TERM CURRENT USE OF INSULIN: ICD-10-CM

## 2018-03-12 DIAGNOSIS — I15.2 HYPERTENSION ASSOCIATED WITH DIABETES: ICD-10-CM

## 2018-03-12 PROCEDURE — 90686 IIV4 VACC NO PRSV 0.5 ML IM: CPT | Mod: S$GLB,,, | Performed by: INTERNAL MEDICINE

## 2018-03-12 PROCEDURE — 99214 OFFICE O/P EST MOD 30 MIN: CPT | Mod: 25,S$GLB,, | Performed by: NURSE PRACTITIONER

## 2018-03-12 PROCEDURE — 90471 IMMUNIZATION ADMIN: CPT | Mod: S$GLB,,, | Performed by: INTERNAL MEDICINE

## 2018-03-12 PROCEDURE — 3078F DIAST BP <80 MM HG: CPT | Mod: CPTII,S$GLB,, | Performed by: NURSE PRACTITIONER

## 2018-03-12 PROCEDURE — 3075F SYST BP GE 130 - 139MM HG: CPT | Mod: CPTII,S$GLB,, | Performed by: NURSE PRACTITIONER

## 2018-03-12 RX ORDER — LISINOPRIL 2.5 MG/1
2.5 TABLET ORAL DAILY
Qty: 90 TABLET | Refills: 3 | Status: SHIPPED | OUTPATIENT
Start: 2018-03-12 | End: 2019-04-26 | Stop reason: SDUPTHER

## 2018-03-12 RX ORDER — METFORMIN HYDROCHLORIDE 500 MG/1
1000 TABLET, EXTENDED RELEASE ORAL DAILY
Qty: 180 TABLET | Refills: 3 | Status: SHIPPED | OUTPATIENT
Start: 2018-03-12 | End: 2019-02-25 | Stop reason: SDUPTHER

## 2018-03-12 RX ORDER — METOPROLOL SUCCINATE 25 MG/1
25 TABLET, EXTENDED RELEASE ORAL DAILY
Qty: 30 TABLET | Refills: 11 | Status: SHIPPED | OUTPATIENT
Start: 2018-03-12 | End: 2018-04-19 | Stop reason: SDUPTHER

## 2018-03-12 RX ORDER — ATORVASTATIN CALCIUM 40 MG/1
40 TABLET, FILM COATED ORAL DAILY
Qty: 90 TABLET | Refills: 3 | Status: SHIPPED | OUTPATIENT
Start: 2018-03-12 | End: 2019-04-26 | Stop reason: SDUPTHER

## 2018-03-12 RX ORDER — DAPAGLIFLOZIN 5 MG/1
5 TABLET, FILM COATED ORAL DAILY
Qty: 30 TABLET | Refills: 0 | Status: SHIPPED | OUTPATIENT
Start: 2018-03-12 | End: 2018-04-19

## 2018-03-12 NOTE — PROGRESS NOTES
Subjective:       Patient ID: Aleksandra Keys is a 62 y.o. male.    Chief Complaint: Follow-up    Diabetes   He presents for his initial diabetic visit. He has type 2 diabetes mellitus. No MedicAlert identification noted. The initial diagnosis of diabetes was made 6 years ago. Hypoglycemia symptoms include headaches, hunger, nervousness/anxiousness and pallor. Pertinent negatives for hypoglycemia include no confusion, dizziness, mood changes, seizures, sleepiness, speech difficulty, sweats or tremors. Associated symptoms include chest pain, fatigue, polyphagia and weight loss. Pertinent negatives for diabetes include no blurred vision, no foot paresthesias, no foot ulcerations, no polydipsia, no polyuria, no visual change and no weakness. Pertinent negatives for hypoglycemia complications include no blackouts, no hospitalization, no required assistance and no required glucagon injection. Symptoms are stable. Diabetic complications include heart disease and nephropathy. Pertinent negatives for diabetic complications include no autonomic neuropathy, CVA, impotence, peripheral neuropathy, PVD or retinopathy. Risk factors for coronary artery disease include dyslipidemia, family history, hypertension, diabetes mellitus and male sex. Current diabetic treatment includes oral agent (monotherapy). He is compliant with treatment some of the time. His weight is stable. He is following a generally healthy diet. Meal planning includes avoidance of concentrated sweets. He has not had a previous visit with a dietitian. He participates in exercise intermittently. He monitors blood glucose at home 1-2 x per week. Blood glucose monitoring compliance is adequate. There is no change in his home blood glucose trend. He does not see a podiatrist.Eye exam is not current.     Has a very low pulse, but denies any dizziness or other symptoms. His cardiologist is aware and he had a holter recently.     He is asplenic, usually stays current on  vaccines.     Has had repeated bouts of diverticulitis.     Review of Systems   Constitutional: Positive for fatigue and weight loss.   Eyes: Negative for blurred vision.   Cardiovascular: Positive for chest pain.   Endocrine: Positive for polyphagia. Negative for polydipsia and polyuria.   Genitourinary: Negative for impotence.   Skin: Positive for pallor.   Neurological: Positive for headaches. Negative for dizziness, tremors, seizures, speech difficulty and weakness.   Psychiatric/Behavioral: Negative for confusion. The patient is nervous/anxious.        Objective:       Lab Results   Component Value Date    HGBA1C 6.2 (H) 06/20/2017     CMP  Sodium   Date Value Ref Range Status   06/20/2017 140 136 - 145 mmol/L Final     Potassium   Date Value Ref Range Status   06/20/2017 4.2 3.5 - 5.1 mmol/L Final     Chloride   Date Value Ref Range Status   06/20/2017 104 95 - 110 mmol/L Final     CO2   Date Value Ref Range Status   06/20/2017 29 23 - 29 mmol/L Final     Glucose   Date Value Ref Range Status   06/20/2017 130 (H) 70 - 110 mg/dL Final     BUN, Bld   Date Value Ref Range Status   06/20/2017 13 6 - 20 mg/dL Final     Creatinine   Date Value Ref Range Status   06/20/2017 0.9 0.5 - 1.4 mg/dL Final   03/10/2013 1.1 0.5 - 1.4 mg/dL Final     Calcium   Date Value Ref Range Status   06/20/2017 9.1 8.7 - 10.5 mg/dL Final   03/10/2013 9.1 8.7 - 10.5 mg/dL Final     Total Protein   Date Value Ref Range Status   06/20/2017 6.8 6.0 - 8.4 g/dL Final     Albumin   Date Value Ref Range Status   06/20/2017 3.8 3.5 - 5.2 g/dL Final     Total Bilirubin   Date Value Ref Range Status   06/20/2017 1.3 (H) 0.1 - 1.0 mg/dL Final     Comment:     For infants and newborns, interpretation of results should be based  on gestational age, weight and in agreement with clinical  observations.  Premature Infant recommended reference ranges:  Up to 24 hours.............<8.0 mg/dL  Up to 48 hours............<12.0 mg/dL  3-5  days..................<15.0 mg/dL  6-29 days.................<15.0 mg/dL       Alkaline Phosphatase   Date Value Ref Range Status   06/20/2017 67 55 - 135 U/L Final   03/10/2013 49 (L) 55 - 135 U/L Final     AST   Date Value Ref Range Status   06/20/2017 24 10 - 40 U/L Final   03/10/2013 32 10 - 40 U/L Final     ALT   Date Value Ref Range Status   06/20/2017 38 10 - 44 U/L Final     Anion Gap   Date Value Ref Range Status   06/20/2017 7 (L) 8 - 16 mmol/L Final   03/10/2013 13 5 - 15 meq/L Final     eGFR if    Date Value Ref Range Status   06/20/2017 >60.0 >60 mL/min/1.73 m^2 Final     eGFR if non    Date Value Ref Range Status   06/20/2017 >60.0 >60 mL/min/1.73 m^2 Final     Comment:     Calculation used to obtain the estimated glomerular filtration  rate (eGFR) is the CKD-EPI equation. Since race is unknown   in our information system, the eGFR values for   -American and Non--American patients are given   for each creatinine result.       Lab Results   Component Value Date    CHOL 154 06/20/2017    CHOL 146 06/27/2016    CHOL 137 08/11/2015     Lab Results   Component Value Date    HDL 36 (L) 06/20/2017    HDL 33 (L) 06/27/2016    HDL 38 (L) 08/11/2015     Lab Results   Component Value Date    LDLCALC 70.8 06/20/2017    LDLCALC 49.8 (L) 06/27/2016    LDLCALC 68.2 08/11/2015     Lab Results   Component Value Date    TRIG 236 (H) 06/20/2017    TRIG 316 (H) 06/27/2016    TRIG 154 (H) 08/11/2015     Lab Results   Component Value Date    CHOLHDL 23.4 06/20/2017    CHOLHDL 22.6 06/27/2016    CHOLHDL 27.7 08/11/2015       Physical Exam   Constitutional: He is oriented to person, place, and time. He appears well-developed and well-nourished. No distress.   HENT:   Head: Normocephalic and atraumatic.   Eyes: Conjunctivae are normal. Right eye exhibits no discharge. Left eye exhibits no discharge. No scleral icterus.   Cardiovascular: Normal rate, regular rhythm and normal heart  sounds.  Exam reveals no gallop and no friction rub.    No murmur heard.  Pulses:       Dorsalis pedis pulses are 1+ on the right side, and 1+ on the left side.        Posterior tibial pulses are 1+ on the right side, and 1+ on the left side.   Pulmonary/Chest: Effort normal and breath sounds normal. No respiratory distress. He has no wheezes. He has no rales.   Musculoskeletal:        Right foot: There is normal range of motion and no deformity.        Left foot: There is normal range of motion and no deformity.   Feet:   Right Foot:   Protective Sensation: 10 sites tested. 10 sites sensed.   Skin Integrity: Positive for dry skin. Negative for ulcer, blister, skin breakdown, erythema, warmth or callus.   Left Foot:   Protective Sensation: 10 sites tested. 10 sites sensed.   Skin Integrity: Positive for dry skin. Negative for ulcer, blister, skin breakdown, erythema, warmth or callus.   Neurological: He is alert and oriented to person, place, and time.   Skin: Skin is warm and dry. He is not diaphoretic.   Psychiatric: He has a normal mood and affect. His behavior is normal.   Nursing note and vitals reviewed.      Assessment:       1. Flu vaccine need    2. Controlled type 2 diabetes mellitus without complication, without long-term current use of insulin    3. Hypertension associated with diabetes    4. Hyperlipidemia associated with type 2 diabetes mellitus    5. Other hyperlipidemia    6. Good hypertension control        Plan:     Flu vaccine need  -     Influenza - Quadrivalent (3 years & older) (PF)    Controlled type 2 diabetes mellitus without complication, without long-term current use of insulin  -     dapagliflozin (FARXIGA) 5 mg Tab tablet; Take 1 tablet (5 mg total) by mouth once daily.  Dispense: 30 tablet; Refill: 0  -     Hemoglobin A1c; Future; Expected date: 03/12/2018  -     Microalbumin/creatinine urine ratio; Future; Expected date: 03/12/2018  -     metFORMIN (GLUCOPHAGE-XR) 500 MG 24 hr tablet;  Take 2 tablets (1,000 mg total) by mouth once daily. TAKE TWO TABLETS BY MOUTH IN THE MORNING AND ONE IN THE EVENING  Dispense: 180 tablet; Refill: 3    Hypertension associated with diabetes  -     CBC auto differential; Future; Expected date: 03/12/2018  -     Comprehensive metabolic panel; Future; Expected date: 03/12/2018  -     TSH; Future; Expected date: 03/12/2018  -     lisinopril (PRINIVIL,ZESTRIL) 2.5 MG tablet; Take 1 tablet (2.5 mg total) by mouth once daily.  Dispense: 90 tablet; Refill: 3    Hyperlipidemia associated with type 2 diabetes mellitus  -     Lipid panel; Future; Expected date: 03/12/2018    Other hyperlipidemia  -     atorvastatin (LIPITOR) 40 MG tablet; Take 1 tablet (40 mg total) by mouth once daily.  Dispense: 90 tablet; Refill: 3    Good hypertension control  -     metoprolol succinate (TOPROL-XL) 25 MG 24 hr tablet; Take 1 tablet (25 mg total) by mouth once daily.  Dispense: 30 tablet; Refill: 11        Eat a high fiber diet. If you find the farxiga is too expensive, let me know. Do NOT go barefoot.  1 month with labs prior, extended

## 2018-03-15 DIAGNOSIS — Z13.5 DIABETIC RETINOPATHY SCREENING: ICD-10-CM

## 2018-04-05 ENCOUNTER — CLINICAL SUPPORT (OUTPATIENT)
Dept: FAMILY MEDICINE | Facility: CLINIC | Age: 63
End: 2018-04-05
Payer: COMMERCIAL

## 2018-04-05 DIAGNOSIS — E11.9 CONTROLLED TYPE 2 DIABETES MELLITUS WITHOUT COMPLICATION, WITHOUT LONG-TERM CURRENT USE OF INSULIN: ICD-10-CM

## 2018-04-05 DIAGNOSIS — E11.69 HYPERLIPIDEMIA ASSOCIATED WITH TYPE 2 DIABETES MELLITUS: ICD-10-CM

## 2018-04-05 DIAGNOSIS — E11.59 HYPERTENSION ASSOCIATED WITH DIABETES: ICD-10-CM

## 2018-04-05 DIAGNOSIS — E78.5 HYPERLIPIDEMIA ASSOCIATED WITH TYPE 2 DIABETES MELLITUS: ICD-10-CM

## 2018-04-05 DIAGNOSIS — I15.2 HYPERTENSION ASSOCIATED WITH DIABETES: ICD-10-CM

## 2018-04-05 LAB
ALBUMIN SERPL BCP-MCNC: 4 G/DL
ALP SERPL-CCNC: 66 U/L
ALT SERPL W/O P-5'-P-CCNC: 29 U/L
ANION GAP SERPL CALC-SCNC: 8 MMOL/L
AST SERPL-CCNC: 23 U/L
BASOPHILS # BLD AUTO: 0 K/UL
BASOPHILS NFR BLD: 0.5 %
BILIRUB SERPL-MCNC: 0.9 MG/DL
BUN SERPL-MCNC: 11 MG/DL
CALCIUM SERPL-MCNC: 9.8 MG/DL
CHLORIDE SERPL-SCNC: 105 MMOL/L
CHOLEST SERPL-MCNC: 123 MG/DL
CHOLEST/HDLC SERPL: 3.3 {RATIO}
CO2 SERPL-SCNC: 28 MMOL/L
CREAT SERPL-MCNC: 1.1 MG/DL
CREAT UR-MCNC: 232 MG/DL
DIFFERENTIAL METHOD: ABNORMAL
EOSINOPHIL # BLD AUTO: 0.3 K/UL
EOSINOPHIL NFR BLD: 3.3 %
ERYTHROCYTE [DISTWIDTH] IN BLOOD BY AUTOMATED COUNT: 13.1 %
EST. GFR  (AFRICAN AMERICAN): >60 ML/MIN/1.73 M^2
EST. GFR  (NON AFRICAN AMERICAN): >60 ML/MIN/1.73 M^2
ESTIMATED AVG GLUCOSE: 131 MG/DL
GLUCOSE SERPL-MCNC: 132 MG/DL
HBA1C MFR BLD HPLC: 6.2 %
HCT VFR BLD AUTO: 51.1 %
HDLC SERPL-MCNC: 37 MG/DL
HDLC SERPL: 30.1 %
HGB BLD-MCNC: 17 G/DL
LDLC SERPL CALC-MCNC: 60.2 MG/DL
LYMPHOCYTES # BLD AUTO: 3.9 K/UL
LYMPHOCYTES NFR BLD: 41 %
MCH RBC QN AUTO: 33.1 PG
MCHC RBC AUTO-ENTMCNC: 33.3 G/DL
MCV RBC AUTO: 99 FL
MICROALBUMIN UR DL<=1MG/L-MCNC: 40 UG/ML
MICROALBUMIN/CREATININE RATIO: 17.2 UG/MG
MONOCYTES # BLD AUTO: 1 K/UL
MONOCYTES NFR BLD: 10.9 %
NEUTROPHILS # BLD AUTO: 4.2 K/UL
NEUTROPHILS NFR BLD: 44.3 %
NONHDLC SERPL-MCNC: 86 MG/DL
PLATELET # BLD AUTO: 151 K/UL
PMV BLD AUTO: 9.8 FL
POTASSIUM SERPL-SCNC: 4.5 MMOL/L
PROT SERPL-MCNC: 7.4 G/DL
RBC # BLD AUTO: 5.15 M/UL
SODIUM SERPL-SCNC: 141 MMOL/L
TRIGL SERPL-MCNC: 129 MG/DL
TSH SERPL DL<=0.005 MIU/L-ACNC: 0.81 UIU/ML
WBC # BLD AUTO: 9.4 K/UL

## 2018-04-05 PROCEDURE — 85025 COMPLETE CBC W/AUTO DIFF WBC: CPT

## 2018-04-05 PROCEDURE — 80053 COMPREHEN METABOLIC PANEL: CPT

## 2018-04-05 PROCEDURE — 83036 HEMOGLOBIN GLYCOSYLATED A1C: CPT

## 2018-04-05 PROCEDURE — 82043 UR ALBUMIN QUANTITATIVE: CPT

## 2018-04-05 PROCEDURE — 80061 LIPID PANEL: CPT

## 2018-04-05 PROCEDURE — 84443 ASSAY THYROID STIM HORMONE: CPT

## 2018-04-11 ENCOUNTER — DOCUMENTATION ONLY (OUTPATIENT)
Dept: FAMILY MEDICINE | Facility: CLINIC | Age: 63
End: 2018-04-11

## 2018-04-11 NOTE — PROGRESS NOTES
Health Maintenance Due   Topic Date Due    Zoster Vaccine  11/13/2016    Eye Exam  12/20/2017

## 2018-04-12 ENCOUNTER — PATIENT MESSAGE (OUTPATIENT)
Dept: FAMILY MEDICINE | Facility: CLINIC | Age: 63
End: 2018-04-12

## 2018-04-13 ENCOUNTER — TELEPHONE (OUTPATIENT)
Dept: FAMILY MEDICINE | Facility: CLINIC | Age: 63
End: 2018-04-13

## 2018-04-13 NOTE — TELEPHONE ENCOUNTER
----- Message from MAGY Javier sent at 4/13/2018  4:20 PM CDT -----  Please reschedule patient for extended diabetes appointment and call him with date and time. He got called out of town for his job and missed his appointment. Thanks ral

## 2018-04-19 ENCOUNTER — DOCUMENTATION ONLY (OUTPATIENT)
Dept: FAMILY MEDICINE | Facility: CLINIC | Age: 63
End: 2018-04-19

## 2018-04-19 ENCOUNTER — OFFICE VISIT (OUTPATIENT)
Dept: FAMILY MEDICINE | Facility: CLINIC | Age: 63
End: 2018-04-19
Payer: COMMERCIAL

## 2018-04-19 VITALS
BODY MASS INDEX: 32.63 KG/M2 | HEIGHT: 61 IN | TEMPERATURE: 98 F | OXYGEN SATURATION: 93 % | DIASTOLIC BLOOD PRESSURE: 68 MMHG | SYSTOLIC BLOOD PRESSURE: 120 MMHG | HEART RATE: 46 BPM | WEIGHT: 172.81 LBS

## 2018-04-19 DIAGNOSIS — Z23 NEED FOR HEPATITIS A VACCINATION: ICD-10-CM

## 2018-04-19 DIAGNOSIS — M62.838 MUSCLE SPASM OF LEFT SHOULDER: ICD-10-CM

## 2018-04-19 DIAGNOSIS — Z23 NEED FOR SHINGLES VACCINE: ICD-10-CM

## 2018-04-19 DIAGNOSIS — E11.9 CONTROLLED TYPE 2 DIABETES MELLITUS WITHOUT COMPLICATION, WITHOUT LONG-TERM CURRENT USE OF INSULIN: Primary | ICD-10-CM

## 2018-04-19 DIAGNOSIS — I10 GOOD HYPERTENSION CONTROL: ICD-10-CM

## 2018-04-19 DIAGNOSIS — Q89.01 ASPLENIA: ICD-10-CM

## 2018-04-19 DIAGNOSIS — Z23 NEED FOR HEPATITIS B VACCINATION: ICD-10-CM

## 2018-04-19 PROCEDURE — 3044F HG A1C LEVEL LT 7.0%: CPT | Mod: CPTII,S$GLB,, | Performed by: NURSE PRACTITIONER

## 2018-04-19 PROCEDURE — 99214 OFFICE O/P EST MOD 30 MIN: CPT | Mod: 25,S$GLB,, | Performed by: NURSE PRACTITIONER

## 2018-04-19 PROCEDURE — 90746 HEPB VACCINE 3 DOSE ADULT IM: CPT | Mod: S$GLB,,, | Performed by: INTERNAL MEDICINE

## 2018-04-19 PROCEDURE — 90471 IMMUNIZATION ADMIN: CPT | Mod: S$GLB,,, | Performed by: INTERNAL MEDICINE

## 2018-04-19 PROCEDURE — 3078F DIAST BP <80 MM HG: CPT | Mod: CPTII,S$GLB,, | Performed by: NURSE PRACTITIONER

## 2018-04-19 PROCEDURE — 90632 HEPA VACCINE ADULT IM: CPT | Mod: S$GLB,,, | Performed by: INTERNAL MEDICINE

## 2018-04-19 PROCEDURE — 90472 IMMUNIZATION ADMIN EACH ADD: CPT | Mod: S$GLB,,, | Performed by: INTERNAL MEDICINE

## 2018-04-19 PROCEDURE — 3074F SYST BP LT 130 MM HG: CPT | Mod: CPTII,S$GLB,, | Performed by: NURSE PRACTITIONER

## 2018-04-19 RX ORDER — METOPROLOL SUCCINATE 25 MG/1
25 TABLET, EXTENDED RELEASE ORAL DAILY
Qty: 90 TABLET | Refills: 3 | Status: SHIPPED | OUTPATIENT
Start: 2018-04-19 | End: 2019-04-26 | Stop reason: SDUPTHER

## 2018-04-19 RX ORDER — BACLOFEN 10 MG/1
10 TABLET ORAL NIGHTLY PRN
Qty: 90 TABLET | Refills: 3 | Status: SHIPPED | OUTPATIENT
Start: 2018-04-19 | End: 2020-09-22

## 2018-04-19 RX ORDER — DAPAGLIFLOZIN 10 MG/1
10 TABLET, FILM COATED ORAL DAILY
Qty: 90 TABLET | Refills: 3 | Status: SHIPPED | OUTPATIENT
Start: 2018-04-19 | End: 2019-05-04 | Stop reason: SDUPTHER

## 2018-04-19 NOTE — PROGRESS NOTES
Subjective:       Patient ID: Aleksandra Keys is a 62 y.o. male.    Chief Complaint: Follow-up  Here for diabetes follow up, but has pain in left knee. Had a twist injury to the knee about a week ago. Using compression and biofreeze and it has improved.     Diabetes   He presents for his follow-up diabetic visit. He has type 2 diabetes mellitus. His disease course has been improving. There are no hypoglycemic associated symptoms. Associated symptoms include weight loss. Pertinent negatives for diabetes include no chest pain, no foot paresthesias and no foot ulcerations. Current diabetic treatment includes oral agent (dual therapy) (Not having any problems with the farxiga). He is following a generally healthy diet. He participates in exercise daily. An ACE inhibitor/angiotensin II receptor blocker is being taken. Eye exam is not current (Sees Dr. Hays).     Is aspleenic, has not had hep A or Hep B vaccine. Works in drainage ditches frequently.     Has seen cardiologist for bradycardia and gallop in the past. Has no symptoms.   Review of Systems   Constitutional: Positive for weight loss.   Cardiovascular: Negative for chest pain.       Objective:       Lab Results   Component Value Date    HGBA1C 6.2 (H) 04/05/2018     CMP  Sodium   Date Value Ref Range Status   04/05/2018 141 136 - 145 mmol/L Final     Potassium   Date Value Ref Range Status   04/05/2018 4.5 3.5 - 5.1 mmol/L Final     Chloride   Date Value Ref Range Status   04/05/2018 105 95 - 110 mmol/L Final     CO2   Date Value Ref Range Status   04/05/2018 28 23 - 29 mmol/L Final     Glucose   Date Value Ref Range Status   04/05/2018 132 (H) 70 - 110 mg/dL Final     BUN, Bld   Date Value Ref Range Status   04/05/2018 11 8 - 23 mg/dL Final     Creatinine   Date Value Ref Range Status   04/05/2018 1.1 0.5 - 1.4 mg/dL Final   03/10/2013 1.1 0.5 - 1.4 mg/dL Final     Calcium   Date Value Ref Range Status   04/05/2018 9.8 8.7 - 10.5 mg/dL Final   03/10/2013  9.1 8.7 - 10.5 mg/dL Final     Total Protein   Date Value Ref Range Status   04/05/2018 7.4 6.0 - 8.4 g/dL Final     Albumin   Date Value Ref Range Status   04/05/2018 4.0 3.5 - 5.2 g/dL Final     Total Bilirubin   Date Value Ref Range Status   04/05/2018 0.9 0.1 - 1.0 mg/dL Final     Comment:     For infants and newborns, interpretation of results should be based  on gestational age, weight and in agreement with clinical  observations.  Premature Infant recommended reference ranges:  Up to 24 hours.............<8.0 mg/dL  Up to 48 hours............<12.0 mg/dL  3-5 days..................<15.0 mg/dL  6-29 days.................<15.0 mg/dL       Alkaline Phosphatase   Date Value Ref Range Status   04/05/2018 66 55 - 135 U/L Final   03/10/2013 49 (L) 55 - 135 U/L Final     AST   Date Value Ref Range Status   04/05/2018 23 10 - 40 U/L Final   03/10/2013 32 10 - 40 U/L Final     ALT   Date Value Ref Range Status   04/05/2018 29 10 - 44 U/L Final     Anion Gap   Date Value Ref Range Status   04/05/2018 8 8 - 16 mmol/L Final   03/10/2013 13 5 - 15 meq/L Final     eGFR if    Date Value Ref Range Status   04/05/2018 >60 >60 mL/min/1.73 m^2 Final     eGFR if non    Date Value Ref Range Status   04/05/2018 >60 >60 mL/min/1.73 m^2 Final     Comment:     Calculation used to obtain the estimated glomerular filtration  rate (eGFR) is the CKD-EPI equation.        Lab Results   Component Value Date    CHOL 123 04/05/2018    CHOL 154 06/20/2017    CHOL 146 06/27/2016     Lab Results   Component Value Date    HDL 37 (L) 04/05/2018    HDL 36 (L) 06/20/2017    HDL 33 (L) 06/27/2016     Lab Results   Component Value Date    LDLCALC 60.2 (L) 04/05/2018    LDLCALC 70.8 06/20/2017    LDLCALC 49.8 (L) 06/27/2016     Lab Results   Component Value Date    TRIG 129 04/05/2018    TRIG 236 (H) 06/20/2017    TRIG 316 (H) 06/27/2016     Lab Results   Component Value Date    CHOLHDL 30.1 04/05/2018    CHOLHDL 23.4  06/20/2017    CHOLHDL 22.6 06/27/2016     Lab Results   Component Value Date    TSH 0.814 04/05/2018     Lab Results   Component Value Date    WBC 9.40 04/05/2018    HGB 17.0 04/05/2018    HCT 51.1 04/05/2018    MCV 99 (H) 04/05/2018     04/05/2018     Microalbumin/creatinine ratio 17.2  Physical Exam   Constitutional: He is oriented to person, place, and time. He appears well-developed and well-nourished. No distress.   HENT:   Head: Normocephalic and atraumatic.   Eyes: Conjunctivae are normal. Right eye exhibits no discharge. Left eye exhibits no discharge. No scleral icterus.   Cardiovascular: Normal rate and regular rhythm.  Exam reveals gallop. Exam reveals no friction rub.    No murmur heard.  Pulmonary/Chest: Effort normal and breath sounds normal. No respiratory distress. He has no wheezes. He has no rales.   Neurological: He is alert and oriented to person, place, and time.   Skin: Skin is warm and dry. He is not diaphoretic.   Psychiatric: He has a normal mood and affect. His behavior is normal.   Nursing note and vitals reviewed.      Assessment:     This office visit cannot be billed incident to as it does not meet the needed criteria.   This provider spent more than 25 minutes face to face with patient, more than half the time for counseling and coordination of care as noted.    1. Need for hepatitis A vaccination    2. Controlled type 2 diabetes mellitus without complication, without long-term current use of insulin    3. Good hypertension control    4. Muscle spasm of left shoulder    5. Asplenia    6. Need for hepatitis B vaccination    7. Need for shingles vaccine        Plan:     Need for hepatitis A vaccination  -     (In Office Administered) Hepatitis A Vaccine (Adult) (IM)    Controlled type 2 diabetes mellitus without complication, without long-term current use of insulin  -     dapagliflozin 10 mg Tab; Take 10 mg by mouth once daily.  Dispense: 90 tablet; Refill: 3  -     Hemoglobin  A1c; Future; Expected date: 10/19/2018    Good hypertension control  -     metoprolol succinate (TOPROL-XL) 25 MG 24 hr tablet; Take 1 tablet (25 mg total) by mouth once daily.  Dispense: 90 tablet; Refill: 3  -     Comprehensive metabolic panel; Future; Expected date: 10/19/2018    Muscle spasm of left shoulder  -     baclofen (LIORESAL) 10 MG tablet; Take 1 tablet (10 mg total) by mouth nightly as needed.  Dispense: 90 tablet; Refill: 3    Asplenia  -     (In Office Administered) Hepatitis A Vaccine (Adult) (IM)  -     (In Office Administered) Hepatitis B Vaccine (Adult) (IM)    Need for hepatitis B vaccination  -     (In Office Administered) Hepatitis B Vaccine (Adult) (IM)    Need for shingles vaccine  -     varicella-zoster [id number], PF, (SHINGRIX, PF,) 50 mcg/0.5 mL injection; Inject 0.5 mLs into the muscle once. Second dose 2 months after first dose.  Dispense: 0.5 mL; Refill: 1      Naphcon A is good for eye allergies and is sold over the counter. 1 month for hep b vaccine. 6 months with labs and hep a and b vaccines   May benefit from taking some B12, 1000 mg. A day.

## 2018-04-19 NOTE — PATIENT INSTRUCTIONS
Naphcon A is good for eye allergies and is sold over the counter. 1 month for hep b vaccine. 6 months with labs and hep a and b vaccines.  May benefit from taking some B12, 1000 mg. A day.

## 2018-05-02 RX ORDER — TIZANIDINE 4 MG/1
TABLET ORAL
Qty: 60 TABLET | Refills: 2 | OUTPATIENT
Start: 2018-05-02

## 2018-10-12 ENCOUNTER — CLINICAL SUPPORT (OUTPATIENT)
Dept: FAMILY MEDICINE | Facility: CLINIC | Age: 63
End: 2018-10-12
Payer: COMMERCIAL

## 2018-10-12 ENCOUNTER — APPOINTMENT (OUTPATIENT)
Dept: LAB | Facility: HOSPITAL | Age: 63
End: 2018-10-12
Attending: NURSE PRACTITIONER
Payer: COMMERCIAL

## 2018-10-12 DIAGNOSIS — I10 GOOD HYPERTENSION CONTROL: ICD-10-CM

## 2018-10-12 DIAGNOSIS — E11.9 CONTROLLED TYPE 2 DIABETES MELLITUS WITHOUT COMPLICATION, WITHOUT LONG-TERM CURRENT USE OF INSULIN: ICD-10-CM

## 2018-10-12 LAB
ALBUMIN SERPL BCP-MCNC: 4.1 G/DL
ALP SERPL-CCNC: 56 U/L
ALT SERPL W/O P-5'-P-CCNC: 29 U/L
ANION GAP SERPL CALC-SCNC: 10 MMOL/L
AST SERPL-CCNC: 19 U/L
BILIRUB SERPL-MCNC: 1.2 MG/DL
BUN SERPL-MCNC: 16 MG/DL
CALCIUM SERPL-MCNC: 9.4 MG/DL
CHLORIDE SERPL-SCNC: 103 MMOL/L
CO2 SERPL-SCNC: 26 MMOL/L
CREAT SERPL-MCNC: 1.1 MG/DL
EST. GFR  (AFRICAN AMERICAN): >60 ML/MIN/1.73 M^2
EST. GFR  (NON AFRICAN AMERICAN): >60 ML/MIN/1.73 M^2
ESTIMATED AVG GLUCOSE: 128 MG/DL
GLUCOSE SERPL-MCNC: 135 MG/DL
HBA1C MFR BLD HPLC: 6.1 %
POTASSIUM SERPL-SCNC: 4.7 MMOL/L
PROT SERPL-MCNC: 6.9 G/DL
SODIUM SERPL-SCNC: 139 MMOL/L

## 2018-10-12 PROCEDURE — 90686 IIV4 VACC NO PRSV 0.5 ML IM: CPT | Mod: S$GLB,,, | Performed by: INTERNAL MEDICINE

## 2018-10-12 PROCEDURE — 83036 HEMOGLOBIN GLYCOSYLATED A1C: CPT

## 2018-10-12 PROCEDURE — 80053 COMPREHEN METABOLIC PANEL: CPT

## 2018-10-12 PROCEDURE — 90471 IMMUNIZATION ADMIN: CPT | Mod: S$GLB,,, | Performed by: INTERNAL MEDICINE

## 2018-10-18 ENCOUNTER — DOCUMENTATION ONLY (OUTPATIENT)
Dept: FAMILY MEDICINE | Facility: CLINIC | Age: 63
End: 2018-10-18

## 2018-10-19 ENCOUNTER — OFFICE VISIT (OUTPATIENT)
Dept: FAMILY MEDICINE | Facility: CLINIC | Age: 63
End: 2018-10-19
Payer: COMMERCIAL

## 2018-10-19 VITALS
HEIGHT: 61 IN | BODY MASS INDEX: 32.51 KG/M2 | DIASTOLIC BLOOD PRESSURE: 68 MMHG | OXYGEN SATURATION: 96 % | TEMPERATURE: 98 F | SYSTOLIC BLOOD PRESSURE: 126 MMHG | WEIGHT: 172.19 LBS | HEART RATE: 52 BPM

## 2018-10-19 DIAGNOSIS — L57.0 ACTINIC KERATOSES: Primary | ICD-10-CM

## 2018-10-19 PROCEDURE — 99214 OFFICE O/P EST MOD 30 MIN: CPT | Mod: S$GLB,,, | Performed by: NURSE PRACTITIONER

## 2018-10-19 NOTE — PROGRESS NOTES
Subjective:       Patient ID: Aleksandra Keys is a 62 y.o. male.    Chief Complaint: Diabetes    Diabetes   He presents for his follow-up diabetic visit. He has type 2 diabetes mellitus. His disease course has been improving. Pertinent negatives for diabetes include no chest pain. There are no hypoglycemic complications. Risk factors for coronary artery disease include diabetes mellitus and male sex. Current diabetic treatment includes oral agent (dual therapy) (did not think farxiga was helping him, so he stopped it a couple of weeks ago. ). He is compliant with treatment some of the time. He is following a generally unhealthy diet. He participates in exercise daily (walks about a mile a day for his job). An ACE inhibitor/angiotensin II receptor blocker is being taken.     Has raised, tender area on left hand for 3-4 weeks. Denies any trauma to the area. Has not tried anything on it.   Review of Systems   Constitutional: Negative for appetite change and unexpected weight change.   Eyes: Negative for pain and visual disturbance.   Cardiovascular: Positive for palpitations. Negative for chest pain.   Musculoskeletal: Positive for arthralgias.   Hematological: Does not bruise/bleed easily.       Objective:      Physical Exam   Constitutional: He is oriented to person, place, and time. He appears well-developed and well-nourished. No distress.   HENT:   Head: Normocephalic and atraumatic.   Eyes: Conjunctivae are normal. Right eye exhibits no discharge. Left eye exhibits no discharge. No scleral icterus.   Cardiovascular: Normal rate and regular rhythm. Exam reveals gallop. Exam reveals no friction rub.   No murmur heard.  Pulmonary/Chest: Effort normal and breath sounds normal. No respiratory distress. He has no wheezes. He has no rales.   Neurological: He is alert and oriented to person, place, and time.   Skin: Skin is warm and dry. He is not diaphoretic.   Psychiatric: He has a normal mood and affect. His behavior  is normal.   Nursing note and vitals reviewed.      Assessment:     This provider spent  25 minutes face to face with patient, more than half the time for counseling and coordination of care as noted.    1. Actinic keratoses        Plan:       Restart farxiga, it is likely to get an indication for heart disease (as [employer] has it and [employer] just reported a study that appears to favor it as well and they are both in the same category as farxiga). Also, it does appear to be helping your diabetes as you are not willing to make dietary changes, this is one of the best ways to keep your blood sugar down. . Make sure you look at your feet daily. Discussed palpitations after steroid injections, they are likely related, may wish to speak with orthopedist about it and see if there is anything else they can do for the knee pain.    Actinic keratoses  -     Ambulatory Referral to Dermatology

## 2018-10-20 ENCOUNTER — PATIENT MESSAGE (OUTPATIENT)
Dept: FAMILY MEDICINE | Facility: CLINIC | Age: 63
End: 2018-10-20

## 2018-10-25 ENCOUNTER — TELEPHONE (OUTPATIENT)
Dept: ADMINISTRATIVE | Facility: HOSPITAL | Age: 63
End: 2018-10-25

## 2018-11-02 ENCOUNTER — INITIAL CONSULT (OUTPATIENT)
Dept: DERMATOLOGY | Facility: CLINIC | Age: 63
End: 2018-11-02
Payer: COMMERCIAL

## 2018-11-02 DIAGNOSIS — Z12.83 SKIN CANCER SCREENING: ICD-10-CM

## 2018-11-02 DIAGNOSIS — L82.1 SEBORRHEIC KERATOSES: ICD-10-CM

## 2018-11-02 DIAGNOSIS — D48.9 NEOPLASM OF UNCERTAIN BEHAVIOR: Primary | ICD-10-CM

## 2018-11-02 DIAGNOSIS — L81.4 SOLAR LENTIGO: ICD-10-CM

## 2018-11-02 PROCEDURE — 99999 PR PBB SHADOW E&M-EST. PATIENT-LVL III: CPT | Mod: PBBFAC,,, | Performed by: DERMATOLOGY

## 2018-11-02 PROCEDURE — 11100 PR BIOPSY OF SKIN LESION: CPT | Mod: S$GLB,,, | Performed by: DERMATOLOGY

## 2018-11-02 PROCEDURE — 88305 TISSUE EXAM BY PATHOLOGIST: CPT | Performed by: PATHOLOGY

## 2018-11-02 PROCEDURE — 99202 OFFICE O/P NEW SF 15 MIN: CPT | Mod: 25,S$GLB,, | Performed by: DERMATOLOGY

## 2018-11-02 NOTE — PROGRESS NOTES
Subjective:       Patient ID:  Aleksandra Keys is a 62 y.o. male who presents for   Chief Complaint   Patient presents with    Growth     L hand    Skin Check     UBSE     Initial visit  C/o lesion on left dorsal hand x 3 months  Intense sun exposure, telephone line , outdoors daily  No h/o skin cancer  Requests UBSE for cancer screening    H/o ITP, s/p splenectomy  H/o CABG      Growth  - Initial  Affected locations: left hand  Duration: 3 months  Signs / symptoms: pain and scaling (raised)  Severity: mild  Timing: constant  Aggravated by: pressure  Relieving factors/Treatments tried: nothing        Review of Systems   Constitutional: Negative for fever, chills and fatigue.   Skin: Positive for wears hat. Negative for daily sunscreen use and activity-related sunscreen use.   Hematologic/Lymphatic: Bruises/bleeds easily.        Objective:    Physical Exam   Constitutional: He appears well-developed and well-nourished. No distress.   Neurological: He is alert and oriented to person, place, and time. He is not disoriented.   Psychiatric: He has a normal mood and affect.   Skin:   Areas Examined (abnormalities noted in diagram):   Scalp / Hair Palpated and Inspected  Head / Face Inspection Performed  Neck Inspection Performed  Chest / Axilla Inspection Performed  Abdomen Inspection Performed  Back Inspection Performed  RUE Inspected  LUE Inspection Performed                  Diagram Legend     Erythematous scaling macule/papule c/w actinic keratosis       Vascular papule c/w angioma      Pigmented verrucoid papule/plaque c/w seborrheic keratosis      Yellow umbilicated papule c/w sebaceous hyperplasia      Irregularly shaped tan macule c/w lentigo     1-2 mm smooth white papules consistent with Milia      Movable subcutaneous cyst with punctum c/w epidermal inclusion cyst      Subcutaneous movable cyst c/w pilar cyst      Firm pink to brown papule c/w dermatofibroma      Pedunculated fleshy papule(s) c/w  skin tag(s)      Evenly pigmented macule c/w junctional nevus     Mildly variegated pigmented, slightly irregular-bordered macule c/w mildly atypical nevus      Flesh colored to evenly pigmented papule c/w intradermal nevus       Pink pearly papule/plaque c/w basal cell carcinoma      Erythematous hyperkeratotic cursted plaque c/w SCC      Surgical scar with no sign of skin cancer recurrence      Open and closed comedones      Inflammatory papules and pustules      Verrucoid papule consistent consistent with wart     Erythematous eczematous patches and plaques     Dystrophic onycholytic nail with subungual debris c/w onychomycosis     Umbilicated papule    Erythematous-base heme-crusted tan verrucoid plaque consistent with inflamed seborrheic keratosis     Erythematous Silvery Scaling Plaque c/w Psoriasis     See annotation          Assessment / Plan:      Pathology Orders:     Normal Orders This Visit    Tissue Specimen To Pathology, Dermatology     Questions:    Directional Terms:  Other(comment)    Clinical information:  Pink crusted nodule, SCC/KA vs other    Specific Site:  left dorsal hand        Neoplasm of uncertain behavior  -     Tissue Specimen To Pathology, Dermatology  Shave biopsy procedure note:    Shave biopsy performed after verbal consent including risk of infection, scar, recurrence, need for additional treatment of site. Area prepped with alcohol, anesthetized with approximately 1.0cc of 1% lidocaine with epinephrine. Lesional tissue shaved with razor blade. Hemostasis achieved with application of aluminum chloride followed by hyfrecation. No complications. Dressing applied. Wound care explained.    Seborrheic keratoses  These are benign inherited growths without a malignant potential. Reassurance given to patient. No treatment is necessary.     Solar lentigo  This is a benign hyperpigmented sun induced lesion. Daily sun protection will reduce the number of new lesions. Treatment of these benign  lesions are considered cosmetic.    Skin cancer screening  Upper body skin examination performed today including at least 9 points as noted in physical examination. No lesions suspicious for malignancy noted.             Follow-up if symptoms worsen or fail to improve.

## 2018-11-02 NOTE — PATIENT INSTRUCTIONS
Shave Biopsy Wound Care    Your doctor has performed a shave biopsy today.  A band aid and vaseline ointment has been placed over the site.  This should remain in place for 24 hours.  It is recommended that you keep the area dry for the first 24 hours.  After 24 hours, you may remove the band aid and wash the area with warm soap and water and apply Vaseline jelly.  Many patients prefer to use Neosporin or Bacitracin ointment.  This is acceptable; however, know that you can develop an allergy to this medication even if you have used it safely for years.  It is important to keep the area moist.  Letting it dry out and get air slows healing time, and will worsen the scar.  Band aid is optional after first 24 hours.      If you notice increasing redness, tenderness, pain, or yellow drainage at the biopsy site, please notify your doctor.  These are signs of an infection.    If your biopsy site is bleeding, apply firm pressure for 15 minutes straight.  Repeat for another 15 minutes, if it is still bleeding.   If the surgical site continues to bleed, then please contact your doctor.       [facility]  [facility]  [address] [address]  Dept: [phone]

## 2018-11-02 NOTE — LETTER
November 2, 2018      Tara Shields, APRN  [address]  [facility] [address]           [facility]  [address] [facility] [address]  Phone: [phone]          Patient: Aleksandra Keys   MR Number: [id number]   YOB: 1955   Date of Visit: 11/2/2018       Dear Tara Shields:    Thank you for referring Aleksandra Keys to me for evaluation. Attached you will find relevant portions of my assessment and plan of care.    If you have questions, please do not hesitate to call me. I look forward to following Aleksandra Keys along with you.    Sincerely,    Joleen Salas MD    Enclosure  CC:  No Recipients    If you would like to receive this communication electronically, please contact [email or web address] or [phone] to request more information on [employer] Link access.    For providers and/or their staff who would like to refer a patient to [facility], please contact us through our one-stop-shop provider referral line, [facility], at [phone].    If you feel you have received this communication in error or would no longer like to receive these types of communications, please e-mail [email or web address]

## 2019-02-08 ENCOUNTER — OFFICE VISIT (OUTPATIENT)
Dept: DERMATOLOGY | Facility: CLINIC | Age: 64
End: 2019-02-08
Payer: COMMERCIAL

## 2019-02-08 DIAGNOSIS — M72.0 DUPUYTREN CONTRACTURE: ICD-10-CM

## 2019-02-08 DIAGNOSIS — Z85.89 HISTORY OF SQUAMOUS CELL CARCINOMA: Primary | ICD-10-CM

## 2019-02-08 PROCEDURE — 99212 PR OFFICE/OUTPT VISIT, EST, LEVL II, 10-19 MIN: ICD-10-PCS | Mod: S$GLB,,, | Performed by: DERMATOLOGY

## 2019-02-08 PROCEDURE — 99999 PR PBB SHADOW E&M-EST. PATIENT-LVL II: CPT | Mod: PBBFAC,,, | Performed by: DERMATOLOGY

## 2019-02-08 PROCEDURE — 99212 OFFICE O/P EST SF 10 MIN: CPT | Mod: S$GLB,,, | Performed by: DERMATOLOGY

## 2019-02-08 PROCEDURE — 99999 PR PBB SHADOW E&M-EST. PATIENT-LVL II: ICD-10-PCS | Mod: PBBFAC,,, | Performed by: DERMATOLOGY

## 2019-02-08 RX ORDER — CIPROFLOXACIN 500 MG/1
TABLET ORAL
COMMUNITY
Start: 2019-02-03 | End: 2020-07-17

## 2019-02-08 RX ORDER — METRONIDAZOLE 500 MG/1
TABLET ORAL
COMMUNITY
Start: 2019-02-03 | End: 2020-07-17

## 2019-02-08 RX ORDER — METFORMIN HYDROCHLORIDE 500 MG/1
TABLET, EXTENDED RELEASE ORAL
COMMUNITY
End: 2019-07-30 | Stop reason: SDUPTHER

## 2019-02-08 NOTE — PROGRESS NOTES
"  Subjective:       Patient ID:  Aleksandra Keys is a 63 y.o. male who presents for   Chief Complaint   Patient presents with    Follow-up     Bx site- L dorsal hand     Patient last seen 11/2018  bx of lesion on left dorsal hand  here for reevalaution, states "all healed"  FINAL PATHOLOGIC DIAGNOSIS  1. Skin, left dorsal hand, shave biopsy:  - INVASIVE SQUAMOUS CELL CARCINOMA, WELL-DIFFERENTIATED, CRATERIFORM  (KERATOACANTHOMATOUS TYPE).  - MARGINS ARE NEGATIVE IN THE PLANES OF SECTION    aslo c/o changes to palmar aspect of hand        Review of Systems   Constitutional: Negative for fever, chills and fatigue.   Gastrointestinal: Positive for abdominal pain.   Skin: Positive for activity-related sunscreen use and wears hat. Negative for daily sunscreen use.   Hematologic/Lymphatic: Does not bruise/bleed easily.        Objective:    Physical Exam   Constitutional: He appears well-developed and well-nourished. No distress.   Neurological: He is alert and oriented to person, place, and time. He is not disoriented.   Psychiatric: He has a normal mood and affect.   Skin:   Areas Examined (abnormalities noted in diagram):   LUE Inspection Performed  Nails and Digits Inspection Performed             Diagram Legend     Erythematous scaling macule/papule c/w actinic keratosis       Vascular papule c/w angioma      Pigmented verrucoid papule/plaque c/w seborrheic keratosis      Yellow umbilicated papule c/w sebaceous hyperplasia      Irregularly shaped tan macule c/w lentigo     1-2 mm smooth white papules consistent with Milia      Movable subcutaneous cyst with punctum c/w epidermal inclusion cyst      Subcutaneous movable cyst c/w pilar cyst      Firm pink to brown papule c/w dermatofibroma      Pedunculated fleshy papule(s) c/w skin tag(s)      Evenly pigmented macule c/w junctional nevus     Mildly variegated pigmented, slightly irregular-bordered macule c/w mildly atypical nevus      Flesh colored to evenly pigmented " papule c/w intradermal nevus       Pink pearly papule/plaque c/w basal cell carcinoma      Erythematous hyperkeratotic cursted plaque c/w SCC      Surgical scar with no sign of skin cancer recurrence      Open and closed comedones      Inflammatory papules and pustules      Verrucoid papule consistent consistent with wart     Erythematous eczematous patches and plaques     Dystrophic onycholytic nail with subungual debris c/w onychomycosis     Umbilicated papule    Erythematous-base heme-crusted tan verrucoid plaque consistent with inflamed seborrheic keratosis     Erythematous Silvery Scaling Plaque c/w Psoriasis     See annotation      Assessment / Plan:        History of squamous cell carcinoma  Left dorsal hand NER post biopsy, continue to observe    Dupuytren contracture  Left 4th digit tendon  receommend evaluation by PMR or orthopedic surgeon  Will mychalge Dr Garcia to inquire whether he treats medically           Follow-up in about 1 year (around 2/8/2020).

## 2019-02-12 ENCOUNTER — TELEPHONE (OUTPATIENT)
Dept: DERMATOLOGY | Facility: CLINIC | Age: 64
End: 2019-02-12

## 2019-02-12 NOTE — TELEPHONE ENCOUNTER
----- Message from Nerissa Patel LPN sent at 2/11/2019  1:02 PM CST -----      ----- Message -----  From: Krunal Javier MD  Sent: 2/11/2019  11:55 AM  To: Nerissa Patel LPN    I do treat many hand issues, however Dupuytren's should be referred to on orthopedic hand specialist, or perhaps Dr. Corona.    ----- Message -----  From: Chelly Stanford RN  Sent: 2/8/2019  10:36 AM  To: Krunal Javier MD, #    Good morning,   Dr. Javier returns on Monday so I will forward this to him for response then. You may also forward to Orthopedics department in the meantime if sooner response is needed.    Thank you,    ISAAK GrN, RN-BC    ----- Message -----  From: Lorena Barragan LPN  Sent: 2/8/2019   9:24 AM  To: Concepcion ARMSTRONG Staff    Good morning,  Dr Salas would like to know if Dr Javier treats   Dupuytren contracture  Left 4th digit tendon  receommend evaluation by PMR or orthopedic surgeon  Will mesasge Dr Garcia to inquire whether he treats medically

## 2019-02-12 NOTE — TELEPHONE ENCOUNTER
Spoke to pt. Informed he can schedule consult with Dr Dino Corona in [place]. Verbalized understanding.

## 2019-02-25 DIAGNOSIS — E11.9 CONTROLLED TYPE 2 DIABETES MELLITUS WITHOUT COMPLICATION, WITHOUT LONG-TERM CURRENT USE OF INSULIN: ICD-10-CM

## 2019-02-25 RX ORDER — METFORMIN HYDROCHLORIDE 500 MG/1
TABLET, EXTENDED RELEASE ORAL
Qty: 180 TABLET | Refills: 3 | Status: SHIPPED | OUTPATIENT
Start: 2019-02-25 | End: 2019-07-30 | Stop reason: SDUPTHER

## 2019-04-22 DIAGNOSIS — I15.2 HYPERTENSION ASSOCIATED WITH DIABETES: ICD-10-CM

## 2019-04-22 DIAGNOSIS — E11.59 HYPERTENSION ASSOCIATED WITH DIABETES: ICD-10-CM

## 2019-04-22 DIAGNOSIS — E78.49 OTHER HYPERLIPIDEMIA: ICD-10-CM

## 2019-04-22 DIAGNOSIS — E11.9 CONTROLLED TYPE 2 DIABETES MELLITUS WITHOUT COMPLICATION, WITHOUT LONG-TERM CURRENT USE OF INSULIN: ICD-10-CM

## 2019-04-22 RX ORDER — ATORVASTATIN CALCIUM 40 MG/1
TABLET, FILM COATED ORAL
Qty: 90 TABLET | Refills: 3 | OUTPATIENT
Start: 2019-04-22

## 2019-04-22 RX ORDER — LISINOPRIL 2.5 MG/1
TABLET ORAL
Qty: 90 TABLET | Refills: 3 | OUTPATIENT
Start: 2019-04-22

## 2019-04-22 RX ORDER — DAPAGLIFLOZIN 10 MG/1
TABLET, FILM COATED ORAL
Qty: 30 TABLET | Refills: 11 | OUTPATIENT
Start: 2019-04-22

## 2019-04-25 ENCOUNTER — PATIENT MESSAGE (OUTPATIENT)
Dept: FAMILY MEDICINE | Facility: CLINIC | Age: 64
End: 2019-04-25

## 2019-04-26 ENCOUNTER — PATIENT MESSAGE (OUTPATIENT)
Dept: FAMILY MEDICINE | Facility: CLINIC | Age: 64
End: 2019-04-26

## 2019-04-26 DIAGNOSIS — E11.59 HYPERTENSION ASSOCIATED WITH DIABETES: ICD-10-CM

## 2019-04-26 DIAGNOSIS — I10 GOOD HYPERTENSION CONTROL: ICD-10-CM

## 2019-04-26 DIAGNOSIS — E78.49 OTHER HYPERLIPIDEMIA: ICD-10-CM

## 2019-04-26 DIAGNOSIS — I15.2 HYPERTENSION ASSOCIATED WITH DIABETES: ICD-10-CM

## 2019-04-26 RX ORDER — LISINOPRIL 2.5 MG/1
2.5 TABLET ORAL DAILY
Qty: 90 TABLET | Refills: 0 | Status: SHIPPED | OUTPATIENT
Start: 2019-04-26 | End: 2019-07-30 | Stop reason: SDUPTHER

## 2019-04-26 RX ORDER — ATORVASTATIN CALCIUM 40 MG/1
40 TABLET, FILM COATED ORAL DAILY
Qty: 90 TABLET | Refills: 0 | Status: SHIPPED | OUTPATIENT
Start: 2019-04-26 | End: 2019-07-30 | Stop reason: SDUPTHER

## 2019-04-26 RX ORDER — METOPROLOL SUCCINATE 25 MG/1
25 TABLET, EXTENDED RELEASE ORAL DAILY
Qty: 90 TABLET | Refills: 0 | Status: SHIPPED | OUTPATIENT
Start: 2019-04-26 | End: 2019-07-30 | Stop reason: SDUPTHER

## 2019-05-04 DIAGNOSIS — E11.9 CONTROLLED TYPE 2 DIABETES MELLITUS WITHOUT COMPLICATION, WITHOUT LONG-TERM CURRENT USE OF INSULIN: ICD-10-CM

## 2019-05-06 RX ORDER — DAPAGLIFLOZIN 10 MG/1
TABLET, FILM COATED ORAL
Qty: 30 TABLET | Refills: 0 | Status: SHIPPED | OUTPATIENT
Start: 2019-05-06 | End: 2020-07-17 | Stop reason: SINTOL

## 2019-05-10 ENCOUNTER — LAB VISIT (OUTPATIENT)
Dept: LAB | Facility: HOSPITAL | Age: 64
End: 2019-05-10
Attending: SPECIALIST
Payer: COMMERCIAL

## 2019-05-10 DIAGNOSIS — I10 ESSENTIAL HYPERTENSION, MALIGNANT: Primary | ICD-10-CM

## 2019-05-10 DIAGNOSIS — I49.3 VENTRICULAR PREMATURE BEATS: ICD-10-CM

## 2019-05-10 DIAGNOSIS — E78.5 HYPERLIPEMIA: ICD-10-CM

## 2019-05-10 LAB
ALBUMIN SERPL BCP-MCNC: 3.8 G/DL (ref 3.5–5.2)
ALP SERPL-CCNC: 86 U/L (ref 55–135)
ALT SERPL W/O P-5'-P-CCNC: 25 U/L (ref 10–44)
ANION GAP SERPL CALC-SCNC: 8 MMOL/L (ref 8–16)
AST SERPL-CCNC: 22 U/L (ref 10–40)
BASOPHILS # BLD AUTO: 0.1 K/UL (ref 0–0.2)
BASOPHILS NFR BLD: 1.6 % (ref 0–1.9)
BILIRUB SERPL-MCNC: 0.9 MG/DL (ref 0.1–1)
BUN SERPL-MCNC: 15 MG/DL (ref 8–23)
CALCIUM SERPL-MCNC: 8.9 MG/DL (ref 8.7–10.5)
CHLORIDE SERPL-SCNC: 105 MMOL/L (ref 95–110)
CHOLEST SERPL-MCNC: 119 MG/DL (ref 120–199)
CHOLEST/HDLC SERPL: 3.2 {RATIO} (ref 2–5)
CO2 SERPL-SCNC: 29 MMOL/L (ref 23–29)
CREAT SERPL-MCNC: 1 MG/DL (ref 0.5–1.4)
DIFFERENTIAL METHOD: ABNORMAL
EOSINOPHIL # BLD AUTO: 0.4 K/UL (ref 0–0.5)
EOSINOPHIL NFR BLD: 6.1 % (ref 0–8)
ERYTHROCYTE [DISTWIDTH] IN BLOOD BY AUTOMATED COUNT: 12.3 % (ref 11.5–14.5)
EST. GFR  (AFRICAN AMERICAN): >60 ML/MIN/1.73 M^2
EST. GFR  (NON AFRICAN AMERICAN): >60 ML/MIN/1.73 M^2
ESTIMATED AVG GLUCOSE: 128 MG/DL (ref 68–131)
GLUCOSE SERPL-MCNC: 137 MG/DL (ref 70–110)
HBA1C MFR BLD HPLC: 6.1 % (ref 4–5.6)
HCT VFR BLD AUTO: 54 % (ref 40–54)
HDLC SERPL-MCNC: 37 MG/DL (ref 40–75)
HDLC SERPL: 31.1 % (ref 20–50)
HGB BLD-MCNC: 18.4 G/DL (ref 14–18)
IMM GRANULOCYTES # BLD AUTO: 0.02 K/UL (ref 0–0.04)
IMM GRANULOCYTES NFR BLD AUTO: 0.3 % (ref 0–0.5)
LDLC SERPL CALC-MCNC: 56.6 MG/DL (ref 63–159)
LYMPHOCYTES # BLD AUTO: 2.5 K/UL (ref 1–4.8)
LYMPHOCYTES NFR BLD: 39.8 % (ref 18–48)
MAGNESIUM SERPL-MCNC: 1.9 MG/DL (ref 1.6–2.6)
MCH RBC QN AUTO: 34 PG (ref 27–31)
MCHC RBC AUTO-ENTMCNC: 34.1 G/DL (ref 32–36)
MCV RBC AUTO: 100 FL (ref 82–98)
MONOCYTES # BLD AUTO: 0.3 K/UL (ref 0.3–1)
MONOCYTES NFR BLD: 4.4 % (ref 4–15)
NEUTROPHILS # BLD AUTO: 3 K/UL (ref 1.8–7.7)
NEUTROPHILS NFR BLD: 47.8 % (ref 38–73)
NONHDLC SERPL-MCNC: 82 MG/DL
NRBC BLD-RTO: 0 /100 WBC
PLATELET # BLD AUTO: 83 K/UL (ref 150–350)
PMV BLD AUTO: 12 FL (ref 9.2–12.9)
POTASSIUM SERPL-SCNC: 4.5 MMOL/L (ref 3.5–5.1)
PROT SERPL-MCNC: 6.7 G/DL (ref 6–8.4)
RBC # BLD AUTO: 5.41 M/UL (ref 4.6–6.2)
SODIUM SERPL-SCNC: 142 MMOL/L (ref 136–145)
TRIGL SERPL-MCNC: 127 MG/DL (ref 30–150)
TSH SERPL DL<=0.005 MIU/L-ACNC: 0.78 UIU/ML (ref 0.4–4)
WBC # BLD AUTO: 6.18 K/UL (ref 3.9–12.7)

## 2019-05-10 PROCEDURE — 36415 COLL VENOUS BLD VENIPUNCTURE: CPT | Mod: PO

## 2019-05-10 PROCEDURE — 83735 ASSAY OF MAGNESIUM: CPT

## 2019-05-10 PROCEDURE — 80061 LIPID PANEL: CPT

## 2019-05-10 PROCEDURE — 83036 HEMOGLOBIN GLYCOSYLATED A1C: CPT

## 2019-05-10 PROCEDURE — 85025 COMPLETE CBC W/AUTO DIFF WBC: CPT

## 2019-05-10 PROCEDURE — 80053 COMPREHEN METABOLIC PANEL: CPT

## 2019-05-10 PROCEDURE — 84443 ASSAY THYROID STIM HORMONE: CPT

## 2019-06-11 NOTE — TELEPHONE ENCOUNTER
Susan Keys   Female, 60 y.o., 6/25/1956  Weight:   91.1 kg (200 lb 13.4 oz)  Phone:   [phone]  PCP:   David Barragan MD  Language:   English  Need Interp:   No  Allergies:   Clarithromycin, Adhesive  Health Maintenance:   Due  Primary Ins.:   [facility]  MRN:   [id number]  Pt Comm Pref:   None  Patient Portal:   Active  Next Appt:   None  My Sticky Note:    Referral Request   Susan Keys   Sent: Tue June 13, 2017 12:07 PM   To: NATHANIEL LI Staff   Referral Request     Susan Keys   David Barragan MD 3 hours ago (12:07 PM)         Hi Dr Barragan..Armin had a colonoscopy the end of April (3rd in 18 months ) with Dr. Abbott.. He is still having problems ..He was in the ER at on Monday June 5th with pain.  Thinking it was diverticulitis and test came back negative...He also did a stool sample and we have not heard back..Now he is still in pain, his back hurts and he's thinking kidneys ...He has seen Dr. Soriano but feels all he wants to do is cut.....Could you refer an [facility] urologist for him....Thank you.....Armin's cell is [phone]...and home is [phone] and my cell is [phone]...Thanks again...          Responsible Party     Pool - [facility] Staff  No one has taken responsibility for this message.           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 \par \par Pt is currently on Xulane. Pt denies any missed or late patches. Pt is happy with method. Pt denies any unwanted side effects. \par \par Last sex: 5/19/19, no condom used. \par \par Pt reports increased frequency of urination & urgency. Pt denies dysuria. Pt denies foul-smelling urine. Pt denies abnormal vaginal itching, discharge, or odor. Pt denies abdominal pain. Pt reports increased thirst. \par \par  [de-identified] : pregnancy test

## 2019-07-30 DIAGNOSIS — I15.2 HYPERTENSION ASSOCIATED WITH DIABETES: ICD-10-CM

## 2019-07-30 DIAGNOSIS — I10 GOOD HYPERTENSION CONTROL: ICD-10-CM

## 2019-07-30 DIAGNOSIS — E11.9 CONTROLLED TYPE 2 DIABETES MELLITUS WITHOUT COMPLICATION, WITHOUT LONG-TERM CURRENT USE OF INSULIN: ICD-10-CM

## 2019-07-30 DIAGNOSIS — E78.49 OTHER HYPERLIPIDEMIA: ICD-10-CM

## 2019-07-30 DIAGNOSIS — E11.59 HYPERTENSION ASSOCIATED WITH DIABETES: ICD-10-CM

## 2019-07-30 RX ORDER — LISINOPRIL 2.5 MG/1
TABLET ORAL
Qty: 90 TABLET | Refills: 0 | Status: SHIPPED | OUTPATIENT
Start: 2019-07-30 | End: 2020-09-22 | Stop reason: SDUPTHER

## 2019-07-30 RX ORDER — METOPROLOL SUCCINATE 25 MG/1
TABLET, EXTENDED RELEASE ORAL
Qty: 90 TABLET | Refills: 0 | Status: SHIPPED | OUTPATIENT
Start: 2019-07-30 | End: 2020-09-22 | Stop reason: SDUPTHER

## 2019-07-30 RX ORDER — METFORMIN HYDROCHLORIDE 500 MG/1
TABLET, EXTENDED RELEASE ORAL
Qty: 270 TABLET | Refills: 0 | Status: SHIPPED | OUTPATIENT
Start: 2019-07-30 | End: 2020-07-17 | Stop reason: SDUPTHER

## 2019-07-30 RX ORDER — ATORVASTATIN CALCIUM 40 MG/1
TABLET, FILM COATED ORAL
Qty: 90 TABLET | Refills: 0 | Status: SHIPPED | OUTPATIENT
Start: 2019-07-30 | End: 2020-09-22 | Stop reason: SDUPTHER

## 2019-10-28 DIAGNOSIS — E78.49 OTHER HYPERLIPIDEMIA: ICD-10-CM

## 2019-10-28 DIAGNOSIS — E11.59 HYPERTENSION ASSOCIATED WITH DIABETES: ICD-10-CM

## 2019-10-28 DIAGNOSIS — I15.2 HYPERTENSION ASSOCIATED WITH DIABETES: ICD-10-CM

## 2019-10-28 DIAGNOSIS — I10 GOOD HYPERTENSION CONTROL: ICD-10-CM

## 2019-10-28 RX ORDER — METOPROLOL SUCCINATE 25 MG/1
TABLET, EXTENDED RELEASE ORAL
Qty: 90 TABLET | Refills: 0 | OUTPATIENT
Start: 2019-10-28

## 2019-10-28 RX ORDER — ATORVASTATIN CALCIUM 40 MG/1
TABLET, FILM COATED ORAL
Qty: 90 TABLET | Refills: 0 | OUTPATIENT
Start: 2019-10-28

## 2019-10-28 RX ORDER — LISINOPRIL 2.5 MG/1
TABLET ORAL
Qty: 90 TABLET | Refills: 0 | OUTPATIENT
Start: 2019-10-28

## 2019-10-31 DIAGNOSIS — E11.59 HYPERTENSION ASSOCIATED WITH DIABETES: ICD-10-CM

## 2019-10-31 DIAGNOSIS — E78.49 OTHER HYPERLIPIDEMIA: ICD-10-CM

## 2019-10-31 DIAGNOSIS — I15.2 HYPERTENSION ASSOCIATED WITH DIABETES: ICD-10-CM

## 2019-11-01 RX ORDER — LISINOPRIL 2.5 MG/1
2.5 TABLET ORAL DAILY
Qty: 90 TABLET | Refills: 0 | OUTPATIENT
Start: 2019-11-01

## 2019-11-01 RX ORDER — ATORVASTATIN CALCIUM 40 MG/1
40 TABLET, FILM COATED ORAL DAILY
Qty: 90 TABLET | Refills: 0 | OUTPATIENT
Start: 2019-11-01

## 2020-05-05 ENCOUNTER — PATIENT MESSAGE (OUTPATIENT)
Dept: ADMINISTRATIVE | Facility: HOSPITAL | Age: 65
End: 2020-05-05

## 2020-07-16 NOTE — PROGRESS NOTES
Subjective:       Patient ID: Aleksandra Keys is a 64 y.o. male.    Chief Complaint: Diabetes  Has not been seen in this clinic for over 2 years and over 3 years with PCP. Called to get meds refilled and no one could do so since he had not been seen within 1 year. Tried to get appointment with PCP and since it was over 3 years, was told he would need a new patient appointment. Patient is upset about this and wants a new PCP that he can get it to see if he needs something right away.   Diabetes  He presents for his follow-up diabetic visit. He has type 2 diabetes mellitus. Disease course: unknown, had labs done for cardiologist, but we do not have them. There are no hypoglycemic associated symptoms. Pertinent negatives for hypoglycemia include no dizziness, headaches, nervousness/anxiousness or pallor. Pertinent negatives for diabetes include no chest pain, no fatigue, no foot paresthesias, no foot ulcerations and no weight loss. Risk factors for coronary artery disease include diabetes mellitus, dyslipidemia, hypertension, male sex and obesity. Current diabetic treatment includes oral agent (monotherapy) (has not follow up here for over 2 years, over 3 at pcp, was running out of metformin so went to urgent care for a 30 day supply. ). He is compliant with treatment some of the time. His weight is increasing steadily. He is following a generally unhealthy diet. Exercise: Feels he gets enough exercise at work. Eye exam is current (had exam at optometrist 6 months ago).   Hypertension  This is a chronic problem. The problem is controlled. Pertinent negatives include no chest pain, headaches or shortness of breath. There are no associated agents to hypertension. Risk factors for coronary artery disease include diabetes mellitus, dyslipidemia, male gender and obesity. Past treatments include ACE inhibitors and beta blockers. The current treatment provides moderate improvement. Compliance problems include diet and  exercise.  Hypertensive end-organ damage includes CAD/MI.   Hyperlipidemia  This is a chronic problem. The current episode started more than 1 year ago. Condition status: unknown as the labs he had done for cardiologist are not available to us.  Exacerbating diseases include diabetes and obesity. Factors aggravating his hyperlipidemia include beta blockers. Pertinent negatives include no chest pain, myalgias or shortness of breath. Current antihyperlipidemic treatment includes statins. Compliance problems: no regular follow up.  Risk factors for coronary artery disease include diabetes mellitus, dyslipidemia, hypertension, male sex and obesity.     Review of Systems   Constitutional: Negative for fatigue, fever, unexpected weight change and weight loss.   HENT: Negative for nasal congestion, hearing loss and sore throat.    Eyes: Negative for pain, redness and visual disturbance.   Respiratory: Negative for cough and shortness of breath.    Cardiovascular: Negative for chest pain and leg swelling.   Gastrointestinal: Negative for constipation, diarrhea, nausea and vomiting.   Endocrine: Negative for cold intolerance and heat intolerance.   Genitourinary: Negative for dysuria and hematuria.   Musculoskeletal: Negative for arthralgias and myalgias.   Integumentary:  Negative for pallor and rash.   Neurological: Negative for dizziness and headaches.   Psychiatric/Behavioral: Negative for dysphoric mood. The patient is not nervous/anxious.          Objective:      Physical Exam  Vitals signs and nursing note reviewed.   Constitutional:       General: He is not in acute distress.     Appearance: Normal appearance. He is well-developed. He is not diaphoretic.   HENT:      Head: Normocephalic and atraumatic.   Eyes:      General: No scleral icterus.        Right eye: No discharge.         Left eye: No discharge.      Conjunctiva/sclera: Conjunctivae normal.   Cardiovascular:      Rate and Rhythm: Normal rate and regular  rhythm.      Pulses:           Dorsalis pedis pulses are 1+ on the right side and 1+ on the left side.        Posterior tibial pulses are 1+ on the right side and 1+ on the left side.      Heart sounds: Normal heart sounds. No murmur. No friction rub. No gallop.    Pulmonary:      Effort: Pulmonary effort is normal. No respiratory distress.      Breath sounds: Normal breath sounds. No stridor. No wheezing, rhonchi or rales.   Musculoskeletal:         General: No swelling.      Right foot: Normal range of motion. No deformity.      Left foot: Normal range of motion. No deformity.   Feet:      Right foot:      Protective Sensation: 10 sites tested. 10 sites sensed.      Skin integrity: No ulcer, blister, skin breakdown, erythema, warmth, callus or dry skin.      Left foot:      Protective Sensation: 10 sites tested. 10 sites sensed.      Skin integrity: No ulcer, blister, skin breakdown, erythema, warmth, callus or dry skin.   Skin:     General: Skin is warm and dry.      Capillary Refill: Capillary refill takes less than 2 seconds.      Coloration: Skin is not jaundiced or pale.      Findings: No rash.   Neurological:      Mental Status: He is alert and oriented to person, place, and time.   Psychiatric:         Mood and Affect: Mood normal.         Behavior: Behavior normal.         Assessment:       1. Hypertension associated with diabetes    2. Controlled type 2 diabetes mellitus without complication, without long-term current use of insulin    3. Hyperlipidemia associated with type 2 diabetes mellitus    4. HIV screening declined    5. Actinic keratoses        Plan:     Hypertension associated with diabetes  -     CBC auto differential; Future; Expected date: 08/31/2020  -     Comprehensive metabolic panel; Future; Expected date: 08/31/2020  -     TSH; Future; Expected date: 08/31/2020    Controlled type 2 diabetes mellitus without complication, without long-term current use of insulin  -     Hemoglobin A1C;  Future; Expected date: 08/31/2020  -     Microalbumin/creatinine urine ratio; Future; Expected date: 08/31/2020  -     metFORMIN (GLUCOPHAGE-XR) 500 MG XR 24hr tablet; TAKE 2 TABLETS BY MOUTH IN THE MORNING AND 1 TABLET BY MOUTH IN THE EVENING  Dispense: 270 tablet; Refill: 3    Hyperlipidemia associated with type 2 diabetes mellitus  -     Lipid Panel; Future; Expected date: 08/31/2020    HIV screening declined    Actinic keratoses  -     Ambulatory referral/consult to Dermatology; Future; Expected date: 07/24/2020        Dr. Rodriguez ordered labs, sent a copy of the order, but not the results.

## 2020-07-16 NOTE — PATIENT INSTRUCTIONS
If you are asked to answer a survey from [facility], please do so and let them know how I did at your visit today.

## 2020-07-17 ENCOUNTER — TELEPHONE (OUTPATIENT)
Dept: FAMILY MEDICINE | Facility: CLINIC | Age: 65
End: 2020-07-17

## 2020-07-17 ENCOUNTER — OFFICE VISIT (OUTPATIENT)
Dept: FAMILY MEDICINE | Facility: CLINIC | Age: 65
End: 2020-07-17
Payer: COMMERCIAL

## 2020-07-17 VITALS
RESPIRATION RATE: 16 BRPM | BODY MASS INDEX: 33.47 KG/M2 | HEART RATE: 48 BPM | OXYGEN SATURATION: 96 % | SYSTOLIC BLOOD PRESSURE: 110 MMHG | DIASTOLIC BLOOD PRESSURE: 68 MMHG | HEIGHT: 61 IN | TEMPERATURE: 97 F | WEIGHT: 177.25 LBS

## 2020-07-17 DIAGNOSIS — Z53.20 HIV SCREENING DECLINED: ICD-10-CM

## 2020-07-17 DIAGNOSIS — E11.9 CONTROLLED TYPE 2 DIABETES MELLITUS WITHOUT COMPLICATION, WITHOUT LONG-TERM CURRENT USE OF INSULIN: Primary | ICD-10-CM

## 2020-07-17 DIAGNOSIS — E11.69 HYPERLIPIDEMIA ASSOCIATED WITH TYPE 2 DIABETES MELLITUS: ICD-10-CM

## 2020-07-17 DIAGNOSIS — I15.2 HYPERTENSION ASSOCIATED WITH DIABETES: ICD-10-CM

## 2020-07-17 DIAGNOSIS — E11.59 HYPERTENSION ASSOCIATED WITH DIABETES: ICD-10-CM

## 2020-07-17 DIAGNOSIS — E78.5 HYPERLIPIDEMIA ASSOCIATED WITH TYPE 2 DIABETES MELLITUS: ICD-10-CM

## 2020-07-17 DIAGNOSIS — L57.0 ACTINIC KERATOSES: ICD-10-CM

## 2020-07-17 PROCEDURE — 99214 PR OFFICE/OUTPT VISIT, EST, LEVL IV, 30-39 MIN: ICD-10-PCS | Mod: S$GLB,,, | Performed by: NURSE PRACTITIONER

## 2020-07-17 PROCEDURE — 99214 OFFICE O/P EST MOD 30 MIN: CPT | Mod: S$GLB,,, | Performed by: NURSE PRACTITIONER

## 2020-07-17 RX ORDER — METFORMIN HYDROCHLORIDE 500 MG/1
TABLET, EXTENDED RELEASE ORAL
Qty: 270 TABLET | Refills: 3 | Status: SHIPPED | OUTPATIENT
Start: 2020-07-17 | End: 2021-11-24 | Stop reason: SDUPTHER

## 2020-08-24 ENCOUNTER — LAB VISIT (OUTPATIENT)
Dept: LAB | Facility: HOSPITAL | Age: 65
End: 2020-08-24
Attending: INTERNAL MEDICINE
Payer: COMMERCIAL

## 2020-08-24 DIAGNOSIS — I15.2 HYPERTENSION ASSOCIATED WITH DIABETES: ICD-10-CM

## 2020-08-24 DIAGNOSIS — E78.5 HYPERLIPIDEMIA ASSOCIATED WITH TYPE 2 DIABETES MELLITUS: ICD-10-CM

## 2020-08-24 DIAGNOSIS — E11.59 HYPERTENSION ASSOCIATED WITH DIABETES: ICD-10-CM

## 2020-08-24 DIAGNOSIS — E11.69 HYPERLIPIDEMIA ASSOCIATED WITH TYPE 2 DIABETES MELLITUS: ICD-10-CM

## 2020-08-24 DIAGNOSIS — E11.9 CONTROLLED TYPE 2 DIABETES MELLITUS WITHOUT COMPLICATION, WITHOUT LONG-TERM CURRENT USE OF INSULIN: ICD-10-CM

## 2020-08-24 LAB
ALBUMIN SERPL BCP-MCNC: 4 G/DL (ref 3.5–5.2)
ALP SERPL-CCNC: 79 U/L (ref 55–135)
ALT SERPL W/O P-5'-P-CCNC: 26 U/L (ref 10–44)
ANION GAP SERPL CALC-SCNC: 8 MMOL/L (ref 8–16)
AST SERPL-CCNC: 21 U/L (ref 10–40)
BASOPHILS # BLD AUTO: 0.09 K/UL (ref 0–0.2)
BASOPHILS NFR BLD: 1.3 % (ref 0–1.9)
BILIRUB SERPL-MCNC: 0.9 MG/DL (ref 0.1–1)
BUN SERPL-MCNC: 20 MG/DL (ref 8–23)
CALCIUM SERPL-MCNC: 8.7 MG/DL (ref 8.7–10.5)
CHLORIDE SERPL-SCNC: 104 MMOL/L (ref 95–110)
CHOLEST SERPL-MCNC: 137 MG/DL (ref 120–199)
CHOLEST/HDLC SERPL: 3.9 {RATIO} (ref 2–5)
CO2 SERPL-SCNC: 26 MMOL/L (ref 23–29)
CREAT SERPL-MCNC: 1 MG/DL (ref 0.5–1.4)
DIFFERENTIAL METHOD: ABNORMAL
EOSINOPHIL # BLD AUTO: 0.4 K/UL (ref 0–0.5)
EOSINOPHIL NFR BLD: 5.4 % (ref 0–8)
ERYTHROCYTE [DISTWIDTH] IN BLOOD BY AUTOMATED COUNT: 12.1 % (ref 11.5–14.5)
EST. GFR  (AFRICAN AMERICAN): >60 ML/MIN/1.73 M^2
EST. GFR  (NON AFRICAN AMERICAN): >60 ML/MIN/1.73 M^2
ESTIMATED AVG GLUCOSE: 137 MG/DL (ref 68–131)
GLUCOSE SERPL-MCNC: 150 MG/DL (ref 70–110)
HBA1C MFR BLD HPLC: 6.4 % (ref 4–5.6)
HCT VFR BLD AUTO: 48.9 % (ref 40–54)
HDLC SERPL-MCNC: 35 MG/DL (ref 40–75)
HDLC SERPL: 25.5 % (ref 20–50)
HGB BLD-MCNC: 16.6 G/DL (ref 14–18)
IMM GRANULOCYTES # BLD AUTO: 0.02 K/UL (ref 0–0.04)
IMM GRANULOCYTES NFR BLD AUTO: 0.3 % (ref 0–0.5)
LDLC SERPL CALC-MCNC: 54.6 MG/DL (ref 63–159)
LYMPHOCYTES # BLD AUTO: 3.2 K/UL (ref 1–4.8)
LYMPHOCYTES NFR BLD: 45.5 % (ref 18–48)
MCH RBC QN AUTO: 33.7 PG (ref 27–31)
MCHC RBC AUTO-ENTMCNC: 33.9 G/DL (ref 32–36)
MCV RBC AUTO: 99 FL (ref 82–98)
MONOCYTES # BLD AUTO: 0.7 K/UL (ref 0.3–1)
MONOCYTES NFR BLD: 9.8 % (ref 4–15)
NEUTROPHILS # BLD AUTO: 2.7 K/UL (ref 1.8–7.7)
NEUTROPHILS NFR BLD: 37.7 % (ref 38–73)
NONHDLC SERPL-MCNC: 102 MG/DL
NRBC BLD-RTO: 0 /100 WBC
PLATELET # BLD AUTO: 127 K/UL (ref 150–350)
PMV BLD AUTO: 11.6 FL (ref 9.2–12.9)
POTASSIUM SERPL-SCNC: 4 MMOL/L (ref 3.5–5.1)
PROT SERPL-MCNC: 6.9 G/DL (ref 6–8.4)
RBC # BLD AUTO: 4.92 M/UL (ref 4.6–6.2)
SODIUM SERPL-SCNC: 138 MMOL/L (ref 136–145)
TRIGL SERPL-MCNC: 237 MG/DL (ref 30–150)
TSH SERPL DL<=0.005 MIU/L-ACNC: 0.97 UIU/ML (ref 0.4–4)
WBC # BLD AUTO: 7.06 K/UL (ref 3.9–12.7)

## 2020-08-24 PROCEDURE — 36415 COLL VENOUS BLD VENIPUNCTURE: CPT | Mod: PO

## 2020-08-24 PROCEDURE — 85025 COMPLETE CBC W/AUTO DIFF WBC: CPT

## 2020-08-24 PROCEDURE — 80053 COMPREHEN METABOLIC PANEL: CPT

## 2020-08-24 PROCEDURE — 84443 ASSAY THYROID STIM HORMONE: CPT

## 2020-08-24 PROCEDURE — 83036 HEMOGLOBIN GLYCOSYLATED A1C: CPT

## 2020-08-24 PROCEDURE — 80061 LIPID PANEL: CPT

## 2020-09-13 LAB
LEFT EYE DM RETINOPATHY: POSITIVE
RIGHT EYE DM RETINOPATHY: POSITIVE

## 2020-09-22 ENCOUNTER — OFFICE VISIT (OUTPATIENT)
Dept: FAMILY MEDICINE | Facility: CLINIC | Age: 65
End: 2020-09-22
Payer: COMMERCIAL

## 2020-09-22 ENCOUNTER — TELEPHONE (OUTPATIENT)
Dept: HEMATOLOGY/ONCOLOGY | Facility: CLINIC | Age: 65
End: 2020-09-22

## 2020-09-22 VITALS
RESPIRATION RATE: 18 BRPM | BODY MASS INDEX: 33.84 KG/M2 | SYSTOLIC BLOOD PRESSURE: 118 MMHG | WEIGHT: 179.25 LBS | DIASTOLIC BLOOD PRESSURE: 68 MMHG | OXYGEN SATURATION: 95 % | TEMPERATURE: 98 F | HEART RATE: 73 BPM | HEIGHT: 61 IN

## 2020-09-22 DIAGNOSIS — E78.5 HYPERLIPIDEMIA ASSOCIATED WITH TYPE 2 DIABETES MELLITUS: ICD-10-CM

## 2020-09-22 DIAGNOSIS — E11.59 HYPERTENSION ASSOCIATED WITH DIABETES: ICD-10-CM

## 2020-09-22 DIAGNOSIS — E11.9 CONTROLLED TYPE 2 DIABETES MELLITUS WITHOUT COMPLICATION, WITHOUT LONG-TERM CURRENT USE OF INSULIN: Primary | ICD-10-CM

## 2020-09-22 DIAGNOSIS — Z23 FLU VACCINE NEED: ICD-10-CM

## 2020-09-22 DIAGNOSIS — D69.3 IDIOPATHIC THROMBOCYTOPENIC PURPURA: ICD-10-CM

## 2020-09-22 DIAGNOSIS — I15.2 HYPERTENSION ASSOCIATED WITH DIABETES: ICD-10-CM

## 2020-09-22 DIAGNOSIS — E11.69 HYPERLIPIDEMIA ASSOCIATED WITH TYPE 2 DIABETES MELLITUS: ICD-10-CM

## 2020-09-22 PROCEDURE — 90686 IIV4 VACC NO PRSV 0.5 ML IM: CPT | Mod: S$GLB,,, | Performed by: NURSE PRACTITIONER

## 2020-09-22 PROCEDURE — 90471 FLU VACCINE (QUAD) GREATER THAN OR EQUAL TO 3YO PRESERVATIVE FREE IM: ICD-10-PCS | Mod: S$GLB,,, | Performed by: NURSE PRACTITIONER

## 2020-09-22 PROCEDURE — 99214 OFFICE O/P EST MOD 30 MIN: CPT | Mod: 25,S$GLB,, | Performed by: NURSE PRACTITIONER

## 2020-09-22 PROCEDURE — 90471 IMMUNIZATION ADMIN: CPT | Mod: S$GLB,,, | Performed by: NURSE PRACTITIONER

## 2020-09-22 PROCEDURE — 90686 FLU VACCINE (QUAD) GREATER THAN OR EQUAL TO 3YO PRESERVATIVE FREE IM: ICD-10-PCS | Mod: S$GLB,,, | Performed by: NURSE PRACTITIONER

## 2020-09-22 PROCEDURE — 99214 PR OFFICE/OUTPT VISIT, EST, LEVL IV, 30-39 MIN: ICD-10-PCS | Mod: 25,S$GLB,, | Performed by: NURSE PRACTITIONER

## 2020-09-22 RX ORDER — ORAL SEMAGLUTIDE 3 MG/1
3 TABLET ORAL DAILY
Qty: 30 TABLET | Refills: 0 | Status: SHIPPED | OUTPATIENT
Start: 2020-09-22 | End: 2020-10-26 | Stop reason: DRUGHIGH

## 2020-09-22 RX ORDER — ATORVASTATIN CALCIUM 40 MG/1
40 TABLET, FILM COATED ORAL NIGHTLY
Qty: 90 TABLET | Refills: 3 | Status: SHIPPED | OUTPATIENT
Start: 2020-09-22

## 2020-09-22 RX ORDER — METOPROLOL SUCCINATE 25 MG/1
25 TABLET, EXTENDED RELEASE ORAL DAILY
Qty: 90 TABLET | Refills: 3 | Status: SHIPPED | OUTPATIENT
Start: 2020-09-22 | End: 2021-11-24 | Stop reason: SDUPTHER

## 2020-09-22 RX ORDER — LISINOPRIL 2.5 MG/1
2.5 TABLET ORAL DAILY
Qty: 90 TABLET | Refills: 3 | Status: SHIPPED | OUTPATIENT
Start: 2020-09-22 | End: 2021-03-22 | Stop reason: SDUPTHER

## 2020-09-22 NOTE — PATIENT INSTRUCTIONS
If you are asked to answer a survey from [facility], please do so and let them know how I did at your visit today.     In 3 weeks, call or message & let us know how you are doing with the Rybelsus - then we can give you more of the medication. Check out the website on the form to get the medication for discounted price. Decrease your food intake by 1/2 for the first week while starting the medication; take it on an empty stomach with no more that 1/2 cup (measured) of water and nothing else for at least 30 minutes after you take it.

## 2020-09-22 NOTE — PROGRESS NOTES
Subjective:      8:10 AM     Patient ID: Aleksandra Keys is a 64 y.o. male.    Chief Complaint: Diabetes    Diabetes  He presents for his follow-up diabetic visit. He has type 2 diabetes mellitus. His disease course has been worsening. Hypoglycemia symptoms include sweats. Pertinent negatives for hypoglycemia include no confusion, dizziness, headaches, nervousness/anxiousness or tremors. Associated symptoms include blurred vision. Pertinent negatives for diabetes include no chest pain, no fatigue, no foot paresthesias, no foot ulcerations, no polyuria and no weight loss. Symptoms are worsening. Diabetic complications include heart disease. Risk factors for coronary artery disease include male sex, dyslipidemia, diabetes mellitus and family history. Current diabetic treatment includes oral agent (monotherapy). He is compliant with treatment all of the time. His weight is increasing steadily. He is following a generally healthy diet. He has not had a previous visit with a dietitian. He participates in exercise daily. Home blood sugar record trend: rarely checks. An ACE inhibitor/angiotensin II receptor blocker is being taken. He does not see a podiatrist.Eye exam is not current (seeing ophthalmologist tomorrow).   Hyperlipidemia  This is a chronic problem. The current episode started more than 1 year ago. Recent lipid tests were reviewed and are normal. Exacerbating diseases include diabetes. Pertinent negatives include no chest pain, leg pain, myalgias or shortness of breath. Current antihyperlipidemic treatment includes statins. There are no compliance problems.  Risk factors for coronary artery disease include male sex, dyslipidemia, diabetes mellitus and hypertension.   Hypertension  This is a chronic problem. The current episode started more than 1 year ago. The problem has been gradually improving since onset. The problem is controlled. Associated symptoms include blurred vision, peripheral edema (hand swelling)  and sweats. Pertinent negatives include no chest pain, headaches, neck pain, orthopnea, palpitations or shortness of breath. Risk factors for coronary artery disease include male gender, dyslipidemia, diabetes mellitus, family history and obesity. Past treatments include ACE inhibitors and beta blockers. The current treatment provides significant improvement. There are no compliance problems.  Hypertensive end-organ damage includes CAD/MI.   Also states recurrent sinus pressure & post-nasal drip, last treated 3 weeks ago. Occasionally uses Claritin but no other treatments tried.   Has chronic ITP since he was a teenager. Denies abnormal bleeding. Has not seen a hematologist in years.   Review of Systems   Constitutional: Negative for appetite change, fatigue, fever and weight loss.   HENT: Negative for congestion, ear pain, hearing loss, nosebleeds, sinus pressure, sore throat and voice change.    Eyes: Positive for blurred vision. Negative for photophobia, pain, discharge, redness, itching and visual disturbance.   Respiratory: Negative for cough, chest tightness and shortness of breath.    Cardiovascular: Negative for chest pain, palpitations, orthopnea and leg swelling.   Gastrointestinal: Negative for abdominal pain, anal bleeding, constipation, diarrhea, nausea and vomiting.   Endocrine: Negative for polyuria.   Genitourinary: Negative for hematuria.   Musculoskeletal: Negative for arthralgias, back pain, joint swelling, myalgias and neck pain.   Skin: Negative for rash.   Allergic/Immunologic: Negative for immunocompromised state.   Neurological: Negative for dizziness, tremors, syncope, numbness and headaches.   Hematological: Negative for adenopathy. Does not bruise/bleed easily.   Psychiatric/Behavioral: Negative for agitation, behavioral problems, confusion, dysphoric mood, sleep disturbance and suicidal ideas. The patient is not nervous/anxious.          Objective:      Vitals:    09/22/20 0809   BP:  "118/68   Pulse: 73   Resp: 18   Temp: 98 °F (36.7 °C)   TempSrc: Temporal   SpO2: 95%   Weight: 81.3 kg (179 lb 3.7 oz)   Height: 5' 1" (1.549 m)   PainSc: 0-No pain     Recent Results (from the past 1008 hour(s))   CBC auto differential    Collection Time: 08/24/20  7:35 AM   Result Value Ref Range    WBC 7.06 3.90 - 12.70 K/uL    RBC 4.92 4.60 - 6.20 M/uL    Hemoglobin 16.6 14.0 - 18.0 g/dL    Hematocrit 48.9 40.0 - 54.0 %    Mean Corpuscular Volume 99 (H) 82 - 98 fL    Mean Corpuscular Hemoglobin 33.7 (H) 27.0 - 31.0 pg    Mean Corpuscular Hemoglobin Conc 33.9 32.0 - 36.0 g/dL    RDW 12.1 11.5 - 14.5 %    Platelets 127 (L) 150 - 350 K/uL    MPV 11.6 9.2 - 12.9 fL    Immature Granulocytes 0.3 0.0 - 0.5 %    Gran # (ANC) 2.7 1.8 - 7.7 K/uL    Immature Grans (Abs) 0.02 0.00 - 0.04 K/uL    Lymph # 3.2 1.0 - 4.8 K/uL    Mono # 0.7 0.3 - 1.0 K/uL    Eos # 0.4 0.0 - 0.5 K/uL    Baso # 0.09 0.00 - 0.20 K/uL    nRBC 0 0 /100 WBC    Gran% 37.7 (L) 38.0 - 73.0 %    Lymph% 45.5 18.0 - 48.0 %    Mono% 9.8 4.0 - 15.0 %    Eosinophil% 5.4 0.0 - 8.0 %    Basophil% 1.3 0.0 - 1.9 %    Differential Method Automated    Comprehensive metabolic panel    Collection Time: 08/24/20  7:35 AM   Result Value Ref Range    Sodium 138 136 - 145 mmol/L    Potassium 4.0 3.5 - 5.1 mmol/L    Chloride 104 95 - 110 mmol/L    CO2 26 23 - 29 mmol/L    Glucose 150 (H) 70 - 110 mg/dL    BUN, Bld 20 8 - 23 mg/dL    Creatinine 1.0 0.5 - 1.4 mg/dL    Calcium 8.7 8.7 - 10.5 mg/dL    Total Protein 6.9 6.0 - 8.4 g/dL    Albumin 4.0 3.5 - 5.2 g/dL    Total Bilirubin 0.9 0.1 - 1.0 mg/dL    Alkaline Phosphatase 79 55 - 135 U/L    AST 21 10 - 40 U/L    ALT 26 10 - 44 U/L    Anion Gap 8 8 - 16 mmol/L    eGFR if African American >60.0 >60 mL/min/1.73 m^2    eGFR if non African American >60.0 >60 mL/min/1.73 m^2   Hemoglobin A1C    Collection Time: 08/24/20  7:35 AM   Result Value Ref Range    Hemoglobin A1C 6.4 (H) 4.0 - 5.6 %    Estimated Avg Glucose 137 (H) 68 - " 131 mg/dL   Lipid Panel    Collection Time: 08/24/20  7:35 AM   Result Value Ref Range    Cholesterol 137 120 - 199 mg/dL    Triglycerides 237 (H) 30 - 150 mg/dL    HDL 35 (L) 40 - 75 mg/dL    LDL Cholesterol 54.6 (L) 63.0 - 159.0 mg/dL    Hdl/Cholesterol Ratio 25.5 20.0 - 50.0 %    Total Cholesterol/HDL Ratio 3.9 2.0 - 5.0    Non-HDL Cholesterol 102 mg/dL   TSH    Collection Time: 08/24/20  7:35 AM   Result Value Ref Range    TSH 0.972 0.400 - 4.000 uIU/mL   Microalbumin/creatinine urine ratio    Collection Time: 08/24/20  9:55 AM   Result Value Ref Range    Microalbum.,U,Random 12.0 ug/mL    Creatinine, Random Ur 221.0 23.0 - 375.0 mg/dL    Microalb Creat Ratio 5.4 0.0 - 30.0 ug/mg     Physical Exam  Vitals signs and nursing note reviewed.   Constitutional:       General: He is not in acute distress.     Appearance: Normal appearance. He is well-developed. He is not diaphoretic.   HENT:      Head: Normocephalic and atraumatic.   Eyes:      General: No scleral icterus.        Right eye: No discharge.         Left eye: No discharge.      Conjunctiva/sclera: Conjunctivae normal.   Cardiovascular:      Rate and Rhythm: Normal rate and regular rhythm.      Heart sounds: Normal heart sounds. No murmur. No friction rub. No gallop.    Pulmonary:      Effort: Pulmonary effort is normal. No respiratory distress.      Breath sounds: Normal breath sounds. No stridor. No wheezing, rhonchi or rales.   Musculoskeletal:         General: No swelling.   Skin:     General: Skin is warm and dry.      Capillary Refill: Capillary refill takes less than 2 seconds.      Coloration: Skin is not jaundiced or pale.      Findings: No rash.   Neurological:      Mental Status: He is alert and oriented to person, place, and time.   Psychiatric:         Mood and Affect: Mood normal.         Behavior: Behavior normal.           Assessment:       1. Controlled type 2 diabetes mellitus without complication, without long-term current use of insulin     2. Hypertension associated with diabetes    3. Hyperlipidemia associated with type 2 diabetes mellitus    4. ITP (idiopathic thrombocytopenic purpura)    5. Flu vaccine need          Plan:       Controlled type 2 diabetes mellitus without complication, without long-term current use of insulin  -     semaglutide (RYBELSUS) 3 mg tablet; Take 1 tablet (3 mg total) by mouth once daily.  Dispense: 30 tablet; Refill: 0  -     Hemoglobin A1C; Future; Expected date: 09/22/2020  -     Comprehensive metabolic panel; Future; Expected date: 09/22/2020    Hypertension associated with diabetes  -     lisinopriL (PRINIVIL,ZESTRIL) 2.5 MG tablet; Take 1 tablet (2.5 mg total) by mouth once daily.  Dispense: 90 tablet; Refill: 3  -     metoprolol succinate (TOPROL-XL) 25 MG 24 hr tablet; Take 1 tablet (25 mg total) by mouth once daily.  Dispense: 90 tablet; Refill: 3    Hyperlipidemia associated with type 2 diabetes mellitus  -     atorvastatin (LIPITOR) 40 MG tablet; Take 1 tablet (40 mg total) by mouth every evening.  Dispense: 90 tablet; Refill: 3    ITP (idiopathic thrombocytopenic purpura)  -     Ambulatory referral/consult to Hematology / Oncology; Future; Expected date: 09/29/2020    Flu vaccine need  -     Influenza - Quadrivalent (PF)      Follow up in about 6 months (around 3/22/2021).    Has diabetic eye exam scheduled with Dr. Hays tomorrow. Discussed using Rybelsus, how to take it, how it works, the risks and benefits. Patient denies any personal or family history of medulary thyroid cancer or MENS2. Has never had gastroparesis or pancreatitis. The patient verbalized understanding and is agreeable to trying it. In 3 weeks, call or message & let us know how you are doing with the Rybelsus - then we can give you more of the medication. Check out the website on the form to get the medication for discounted price. Decrease your food intake by 1/2 for the first week while starting the medication; take it on an empty  stomach with no more that 1/2 cup (measured) of water and nothing else for at least 30 minutes after you take it.

## 2020-09-22 NOTE — PROGRESS NOTES
Administered flu vaccine IM. Patient tolerated well. No bleeding at insertion site noted. Pain scale 0/10 with injection. 2 patient identifiers used (name, ), aseptic technique maintained.

## 2020-09-22 NOTE — TELEPHONE ENCOUNTER
"Spoke to patient who reports he scheduled NP appt this mornign with Dr. Vicente for 10/1 via External  and forgot he will be out of town for 2 weeks.  Patient request NP appt 10/19 with Dr. Vicente "first thing in the morning."  Patient scheduled 10/19/20 @ 0820.   Address to this clinic given. Patient instructed to arrive no more than 10 minutes early and bring picture ID and insurance card(s).   Patient verbalized understanding.  No further questions/concerns noted at this time.    ----- Message from Lenora Garber sent at 9/22/2020  3:46 PM CDT -----  Type: Needs Medical Advice  Who Called: Patient  Best Call Back Number:[phone] (home) [phone] (work)  Additional Information: The patient is asking for a call back he said he needs to resched his appt that is sched for 10/01 he has a conflict on that day asking for a call back asap today       "

## 2020-10-02 ENCOUNTER — PATIENT OUTREACH (OUTPATIENT)
Dept: ADMINISTRATIVE | Facility: HOSPITAL | Age: 65
End: 2020-10-02

## 2020-10-13 LAB
LEFT EYE DM RETINOPATHY: POSITIVE
RIGHT EYE DM RETINOPATHY: POSITIVE

## 2020-10-16 ENCOUNTER — PATIENT OUTREACH (OUTPATIENT)
Dept: ADMINISTRATIVE | Facility: HOSPITAL | Age: 65
End: 2020-10-16

## 2020-10-19 ENCOUNTER — LAB VISIT (OUTPATIENT)
Dept: LAB | Facility: HOSPITAL | Age: 65
End: 2020-10-19
Attending: INTERNAL MEDICINE
Payer: COMMERCIAL

## 2020-10-19 ENCOUNTER — OFFICE VISIT (OUTPATIENT)
Dept: HEMATOLOGY/ONCOLOGY | Facility: CLINIC | Age: 65
End: 2020-10-19
Payer: COMMERCIAL

## 2020-10-19 VITALS
OXYGEN SATURATION: 93 % | HEIGHT: 61 IN | WEIGHT: 176.56 LBS | HEART RATE: 78 BPM | RESPIRATION RATE: 18 BRPM | BODY MASS INDEX: 33.34 KG/M2 | TEMPERATURE: 97 F | DIASTOLIC BLOOD PRESSURE: 89 MMHG | SYSTOLIC BLOOD PRESSURE: 132 MMHG

## 2020-10-19 DIAGNOSIS — D69.6 THROMBOCYTOPENIA: ICD-10-CM

## 2020-10-19 DIAGNOSIS — D69.3 IDIOPATHIC THROMBOCYTOPENIC PURPURA: ICD-10-CM

## 2020-10-19 DIAGNOSIS — Z90.81 POST-SPLENECTOMY: Primary | ICD-10-CM

## 2020-10-19 DIAGNOSIS — R73.09 ELEVATED HEMOGLOBIN A1C: ICD-10-CM

## 2020-10-19 LAB — FOLATE SERPL-MCNC: 18.6 NG/ML (ref 4–24)

## 2020-10-19 PROCEDURE — 99205 OFFICE O/P NEW HI 60 MIN: CPT | Mod: S$GLB,,, | Performed by: INTERNAL MEDICINE

## 2020-10-19 PROCEDURE — 86317 IMMUNOASSAY INFECTIOUS AGENT: CPT

## 2020-10-19 PROCEDURE — 99999 PR PBB SHADOW E&M-EST. PATIENT-LVL V: CPT | Mod: PBBFAC,,, | Performed by: INTERNAL MEDICINE

## 2020-10-19 PROCEDURE — 84425 ASSAY OF VITAMIN B-1: CPT

## 2020-10-19 PROCEDURE — 3008F BODY MASS INDEX DOCD: CPT | Mod: CPTII,S$GLB,, | Performed by: INTERNAL MEDICINE

## 2020-10-19 PROCEDURE — 99999 PR PBB SHADOW E&M-EST. PATIENT-LVL V: ICD-10-PCS | Mod: PBBFAC,,, | Performed by: INTERNAL MEDICINE

## 2020-10-19 PROCEDURE — 3079F PR MOST RECENT DIASTOLIC BLOOD PRESSURE 80-89 MM HG: ICD-10-PCS | Mod: CPTII,S$GLB,, | Performed by: INTERNAL MEDICINE

## 2020-10-19 PROCEDURE — 3079F DIAST BP 80-89 MM HG: CPT | Mod: CPTII,S$GLB,, | Performed by: INTERNAL MEDICINE

## 2020-10-19 PROCEDURE — 3075F PR MOST RECENT SYSTOLIC BLOOD PRESS GE 130-139MM HG: ICD-10-PCS | Mod: CPTII,S$GLB,, | Performed by: INTERNAL MEDICINE

## 2020-10-19 PROCEDURE — 99205 PR OFFICE/OUTPT VISIT, NEW, LEVL V, 60-74 MIN: ICD-10-PCS | Mod: S$GLB,,, | Performed by: INTERNAL MEDICINE

## 2020-10-19 PROCEDURE — 3008F PR BODY MASS INDEX (BMI) DOCUMENTED: ICD-10-PCS | Mod: CPTII,S$GLB,, | Performed by: INTERNAL MEDICINE

## 2020-10-19 PROCEDURE — 36415 COLL VENOUS BLD VENIPUNCTURE: CPT

## 2020-10-19 PROCEDURE — 82787 IGG 1 2 3 OR 4 EACH: CPT

## 2020-10-19 PROCEDURE — 3075F SYST BP GE 130 - 139MM HG: CPT | Mod: CPTII,S$GLB,, | Performed by: INTERNAL MEDICINE

## 2020-10-19 PROCEDURE — 83921 ORGANIC ACID SINGLE QUANT: CPT

## 2020-10-19 PROCEDURE — 82746 ASSAY OF FOLIC ACID SERUM: CPT

## 2020-10-19 PROCEDURE — 84207 ASSAY OF VITAMIN B-6: CPT

## 2020-10-19 NOTE — PROGRESS NOTES
CC I have had ITP    Aleksandra Keys is a 64 y.o.  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  His platelets seem to be ranging from 70,000 to 127,000.  He is a diabetic on metformin with latest hemoglobin A1c of 6.4.  He is tolerating lisinopril for hypertension and dyslipidemia stable with a statin.  He reports no bleeding, no melena or hematochezia no hemoptysis.  He had his spleen removed around 1962. He has not required Rituxan or steroids. He feels extremely fatigued when his platelets drop.     Past Medical History:   Diagnosis Date    Anxiety     Benign hypertension     Diabetes mellitus, type 2     Diverticulitis     Fatty liver     GERD (gastroesophageal reflux disease)     Hyperlipidemia     ITP (idiopathic thrombocytopenic purpura)     Occlusive coronary artery disease      Past Surgical History:   Procedure Laterality Date    COLONOSCOPY  04/11/2016    Dr. Abbott, multiple polyps, recheck 6 month    CORONARY ARTERY BYPASS GRAFT      EYE SURGERY      SPLENECTOMY, TOTAL         Current Outpatient Medications:     aspirin 81 mg Tab, Take 1 tablet by mouth once daily. , Disp: , Rfl:     atorvastatin (LIPITOR) 40 MG tablet, Take 1 tablet (40 mg total) by mouth every evening., Disp: 90 tablet, Rfl: 3    blood-glucose meter (ACCU-CHEK DUGLAS) Misc, Check blood sugar once daily, accu-check duglas glucometer kit with fastclix lancet device, Disp: 1 each, Rfl: 0    lancets (ACCU-CHEK FASTCLIX) Misc, Check sugar once daily, fastclix lancet drum, Disp: 50 each, Rfl: 11    lisinopriL (PRINIVIL,ZESTRIL) 2.5 MG tablet, Take 1 tablet (2.5 mg total) by mouth once daily., Disp: 90 tablet, Rfl: 3    metFORMIN (GLUCOPHAGE-XR) 500 MG XR 24hr tablet, TAKE 2 TABLETS BY MOUTH IN THE MORNING AND 1 TABLET BY MOUTH IN THE EVENING, Disp: 270 tablet, Rfl: 3    metoprolol succinate (TOPROL-XL) 25 MG 24 hr tablet, Take 1 tablet (25 mg total) by mouth once daily., Disp: 90 tablet, Rfl:  "3    semaglutide (RYBELSUS) 3 mg tablet, Take 1 tablet (3 mg total) by mouth once daily., Disp: 30 tablet, Rfl: 0  Review of patient's allergies indicates:   Allergen Reactions    Dapagliflozin Other (See Comments)     myalgia    Niacin      Social History     Tobacco Use    Smoking status: Former Smoker     Quit date: 10/9/2010     Years since quitting: 10.0    Smokeless tobacco: Never Used   Substance Use Topics    Alcohol use: Yes    Drug use: No     Family History   Problem Relation Age of Onset    Diabetes Mother     Hypertension Mother     Skin cancer Mother     Diabetes Brother     Heart disease Brother     Hyperlipidemia Father     Hypertension Father     Diabetes Maternal Aunt     Heart disease Maternal Uncle     Cancer Paternal Aunt         thyroid    Heart disease Paternal Uncle     Cancer Paternal Uncle         lung    Diabetes Maternal Grandmother     Heart disease Maternal Grandfather     Heart disease Paternal Grandfather     No Known Problems Son     Melanoma Neg Hx     Psoriasis Neg Hx     Lupus Neg Hx     Eczema Neg Hx        CONSTITUTIONAL: No fevers, chills, night sweats, wt. loss, appetite changes  SKIN: no rashes or itching  ENT: No headaches, head trauma, vision changes, or eye pain  LYMPH NODES: None noticed   CV: No chest pain, palpitations.   RESP: No dyspnea on exertion, cough, wheezing, or hemoptysis  GI: No nausea, emesis, diarrhea, constipation, melena, hematochezia, pain.   : No dysuria, hematuria, urgency, or frequency   HEME: No easy bruising, bleeding problems  PSYCHIATRIC: No depression, anxiety, psychosis, hallucinations.  NEURO: No seizures, memory loss, dizziness or difficulty sleeping  MSK: No arthralgias or joint swelling         /89 (BP Location: Right arm, Patient Position: Sitting, BP Method: Medium (Automatic))   Pulse 78   Temp 97.3 °F (36.3 °C) (Temporal)   Resp 18   Ht 5' 1" (1.549 m)   Wt 80.1 kg (176 lb 9.4 oz)   SpO2 (!) 93%  "  BMI 33.37 kg/m²   Gen: NAD, A and O x3,   Psych: pleasant affect, normal thought process  Eyes: Pupils round and non dilated, EOM intact  Nose: Nares patent  OP clear, mucosa patent  Neck: suppple, no JVD, trachea midline, no palpable mass, no adenopathy  Lungs: CTAB, no wheezes, no use of accessory muscles  CV: S1S2 with RRR, No mrg  Abd: soft, NTND, + BS, No HSM, no ascites  Extr: No CCE, DOTTY, strength normal, good capillary refill  Neuro: steady gait, CNs grossly intact  Skin: intact, no lesions noted  Rheum: No joint swelling  Cranial Nerves:      II: Pupillary reflexes normal     III, IV, VI: EOM normal     V: 1,2,3: normal sensation     VII: Normal strength in all divisions     IX, X: Normal voice, palatal elevation and sensation     XI: Shoulder strength normal       XII: Tongue mobility normal    Lab Results   Component Value Date    WBC 7.06 08/24/2020    HGB 16.6 08/24/2020    HCT 48.9 08/24/2020    MCV 99 (H) 08/24/2020     (L) 08/24/2020     CMP  Sodium   Date Value Ref Range Status   08/24/2020 138 136 - 145 mmol/L Final     Potassium   Date Value Ref Range Status   08/24/2020 4.0 3.5 - 5.1 mmol/L Final     Chloride   Date Value Ref Range Status   08/24/2020 104 95 - 110 mmol/L Final     CO2   Date Value Ref Range Status   08/24/2020 26 23 - 29 mmol/L Final     Glucose   Date Value Ref Range Status   08/24/2020 150 (H) 70 - 110 mg/dL Final     BUN, Bld   Date Value Ref Range Status   08/24/2020 20 8 - 23 mg/dL Final     Creatinine   Date Value Ref Range Status   08/24/2020 1.0 0.5 - 1.4 mg/dL Final   03/10/2013 1.1 0.5 - 1.4 mg/dL Final     Calcium   Date Value Ref Range Status   08/24/2020 8.7 8.7 - 10.5 mg/dL Final   03/10/2013 9.1 8.7 - 10.5 mg/dL Final     Total Protein   Date Value Ref Range Status   08/24/2020 6.9 6.0 - 8.4 g/dL Final     Albumin   Date Value Ref Range Status   08/24/2020 4.0 3.5 - 5.2 g/dL Final     Total Bilirubin   Date Value Ref Range Status   08/24/2020 0.9 0.1 -  1.0 mg/dL Final     Comment:     For infants and newborns, interpretation of results should be based  on gestational age, weight and in agreement with clinical  observations.  Premature Infant recommended reference ranges:  Up to 24 hours.............<8.0 mg/dL  Up to 48 hours............<12.0 mg/dL  3-5 days..................<15.0 mg/dL  6-29 days.................<15.0 mg/dL       Alkaline Phosphatase   Date Value Ref Range Status   08/24/2020 79 55 - 135 U/L Final   03/10/2013 49 (L) 55 - 135 U/L Final     AST   Date Value Ref Range Status   08/24/2020 21 10 - 40 U/L Final   03/10/2013 32 10 - 40 U/L Final     ALT   Date Value Ref Range Status   08/24/2020 26 10 - 44 U/L Final     Anion Gap   Date Value Ref Range Status   08/24/2020 8 8 - 16 mmol/L Final   03/10/2013 13 5 - 15 meq/L Final     eGFR if    Date Value Ref Range Status   08/24/2020 >60.0 >60 mL/min/1.73 m^2 Final     eGFR if non    Date Value Ref Range Status   08/24/2020 >60.0 >60 mL/min/1.73 m^2 Final     Comment:     Calculation used to obtain the estimated glomerular filtration  rate (eGFR) is the CKD-EPI equation.          Post-splenectomy    Thrombocytopenia  -     Methylmalonic Acid, Serum; Future; Expected date: 10/19/2020  -     Vitamin B1; Future; Expected date: 10/19/2020  -     Vitamin B6; Future; Expected date: 10/19/2020  -     Folate; Future; Expected date: 10/19/2020  -     S.PNEUMONIAE IGG SEROTYPES; Future; Expected date: 10/19/2020  -     IGG 1, 2, 3, AND 4; Future; Expected date: 10/19/2020    Elevated hemoglobin A1c    ITP (idiopathic thrombocytopenic purpura)  -     Ambulatory referral/consult to Hematology / Oncology      Encouraged pt to go back to the lab for further workup and to check strep titers. He may need another strep booster. He may cont prenatal vitamins for extra vitamin intake.   Encouraged the use of My Chart for communications and notifications. He is tolerating a statin for  dyslipidemia. He is tolerating glucophage for DM   RTC in about a week to discuss the results      Recommend healthy living: no nicotine,  should avoid alcohol, healthy diet and regular exercise aiming for fitness, and weight control  1. Discussed healthy alcohol daily limit of 0.5 oz of pure alcohol in any 24 hours (roughly one 12-oz beers, 4 oz of wine (8%-12% alcohol), or 1.25 oz (half a shot) of liquor (80 proof)), can not save up.  2.   Discussed good exercise program, 4 key elements: 1. Aerobic (walking, swimming, dancing, jogging, biking, etc, 2. Muscle strengthening / resistance exercise, need to do 2-3 times  weekly, 3. Stretching daily, good stretch takes a whole  total minute. 4. Balance exercise daily.  3.   Discussed healthy diet for over 15 minutes with handouts given to the patient     Discussed COVID-19 and social distancing in great detail, avoid all non-essential visits out of the home if possible and avoid sick contacts.     Thank you for allowing me to evaluate and participate in the care of this pleasant patient. Please fell free to call me with any questions or concerns.    Warmly,  Genevieve Vicente MD    This note was dictated with Dragon and briefly proofread. Please excuse any sentences that may be unclear or words misspelled

## 2020-10-19 NOTE — LETTER
October 19, 2020      Tara Shields, APRN  [address]  [facility] [address]           [facility] Hematology [facility]cology  [address]  [facility] [address]  Phone: [phone]          Patient: Aleksandra Keys   MR Number: [id number]   YOB: 1955   Date of Visit: 10/19/2020       Dear Tara Shields:    Thank you for referring Aleksandra Keys to me for evaluation. Attached you will find relevant portions of my assessment and plan of care.    If you have questions, please do not hesitate to call me. I look forward to following Aleksandra Keys along with you.    Sincerely,    Genevieve Vicente MD    Enclosure  CC:  No Recipients    If you would like to receive this communication electronically, please contact [email or web address] or [phone] to request more information on [employer] access.    For providers and/or their staff who would like to refer a patient to [facility], please contact us through our one-stop-shop provider referral line, [facility], [place], at [phone].    If you feel you have received this communication in error or would no longer like to receive these types of communications, please e-mail [email or web address]

## 2020-10-19 NOTE — Clinical Note
I am sending him to the lab and see him back in about a week, Adrianne agreed to check him out for us

## 2020-10-21 LAB
IGG1 SER-MCNC: 695 MG/DL (ref 382–929)
IGG2 SER-MCNC: 223 MG/DL (ref 242–700)
IGG3 SER-MCNC: 10 MG/DL (ref 22–176)
IGG4 SER-MCNC: 65 MG/DL (ref 4–86)

## 2020-10-22 LAB
DEPRECATED S PNEUM12 IGG SER-MCNC: 1.4 UG/ML
DEPRECATED S PNEUM23 IGG SER-MCNC: 12.6 UG/ML
DEPRECATED S PNEUM4 IGG SER-MCNC: 4.8 UG/ML
DEPRECATED S PNEUM8 IGG SER-MCNC: 11.6 UG/ML
DEPRECATED S PNEUM9 IGG SER-MCNC: 4.2 UG/ML
METHYLMALONATE SERPL-SCNC: 0.16 UMOL/L
PYRIDOXAL SERPL-MCNC: 21 UG/L (ref 5–50)
S PN DA SERO 19F IGG SER-MCNC: 13.9 UG/ML
S PNEUM DA 1 IGG SER-MCNC: 0.8 UG/ML
S PNEUM DA 14 IGG SER-MCNC: 28.5
S PNEUM DA 18C IGG SER-MCNC: 3.1
S PNEUM DA 3 IGG SER-MCNC: 8 UG/ML
S PNEUM DA 5 IGG SER-MCNC: 7.1 UG/ML
S PNEUM DA 6B IGG SER-MCNC: 7.8 UG/ML
S PNEUM DA 7F IGG SER-MCNC: 7.3 UG/ML
S PNEUM DA 9V IGG SER-MCNC: 3.3 UG/ML
VIT B1 BLD-MCNC: 72 UG/L (ref 38–122)

## 2020-10-24 ENCOUNTER — PATIENT MESSAGE (OUTPATIENT)
Dept: FAMILY MEDICINE | Facility: CLINIC | Age: 65
End: 2020-10-24

## 2020-10-26 ENCOUNTER — PATIENT MESSAGE (OUTPATIENT)
Dept: HEMATOLOGY/ONCOLOGY | Facility: CLINIC | Age: 65
End: 2020-10-26

## 2020-10-26 ENCOUNTER — OFFICE VISIT (OUTPATIENT)
Dept: HEMATOLOGY/ONCOLOGY | Facility: CLINIC | Age: 65
End: 2020-10-26
Payer: COMMERCIAL

## 2020-10-26 ENCOUNTER — TELEPHONE (OUTPATIENT)
Dept: HEMATOLOGY/ONCOLOGY | Facility: CLINIC | Age: 65
End: 2020-10-26

## 2020-10-26 VITALS
RESPIRATION RATE: 18 BRPM | BODY MASS INDEX: 33.57 KG/M2 | OXYGEN SATURATION: 95 % | SYSTOLIC BLOOD PRESSURE: 132 MMHG | HEART RATE: 65 BPM | WEIGHT: 177.69 LBS | TEMPERATURE: 98 F | DIASTOLIC BLOOD PRESSURE: 70 MMHG

## 2020-10-26 DIAGNOSIS — K52.9 COLITIS: ICD-10-CM

## 2020-10-26 DIAGNOSIS — J32.9 CHRONIC SINUSITIS, UNSPECIFIED LOCATION: Primary | ICD-10-CM

## 2020-10-26 DIAGNOSIS — E11.9 CONTROLLED TYPE 2 DIABETES MELLITUS WITHOUT COMPLICATION, WITHOUT LONG-TERM CURRENT USE OF INSULIN: Primary | ICD-10-CM

## 2020-10-26 DIAGNOSIS — D69.3 CHRONIC ITP (IDIOPATHIC THROMBOCYTOPENIA): ICD-10-CM

## 2020-10-26 DIAGNOSIS — D80.1 HYPOGAMMAGLOBULINEMIA: ICD-10-CM

## 2020-10-26 DIAGNOSIS — Z90.81 POST-SPLENECTOMY: ICD-10-CM

## 2020-10-26 DIAGNOSIS — R76.8 LOW SERUM IGG2 SUBCLASS LEVEL: ICD-10-CM

## 2020-10-26 DIAGNOSIS — D69.3 CHRONIC ITP (IDIOPATHIC THROMBOCYTOPENIA): Primary | ICD-10-CM

## 2020-10-26 PROCEDURE — 3075F SYST BP GE 130 - 139MM HG: CPT | Mod: CPTII,S$GLB,, | Performed by: INTERNAL MEDICINE

## 2020-10-26 PROCEDURE — 99215 OFFICE O/P EST HI 40 MIN: CPT | Mod: S$GLB,,, | Performed by: INTERNAL MEDICINE

## 2020-10-26 PROCEDURE — 3008F PR BODY MASS INDEX (BMI) DOCUMENTED: ICD-10-PCS | Mod: CPTII,S$GLB,, | Performed by: INTERNAL MEDICINE

## 2020-10-26 PROCEDURE — 3008F BODY MASS INDEX DOCD: CPT | Mod: CPTII,S$GLB,, | Performed by: INTERNAL MEDICINE

## 2020-10-26 PROCEDURE — 3078F PR MOST RECENT DIASTOLIC BLOOD PRESSURE < 80 MM HG: ICD-10-PCS | Mod: CPTII,S$GLB,, | Performed by: INTERNAL MEDICINE

## 2020-10-26 PROCEDURE — 99999 PR PBB SHADOW E&M-EST. PATIENT-LVL III: ICD-10-PCS | Mod: PBBFAC,,, | Performed by: INTERNAL MEDICINE

## 2020-10-26 PROCEDURE — 3078F DIAST BP <80 MM HG: CPT | Mod: CPTII,S$GLB,, | Performed by: INTERNAL MEDICINE

## 2020-10-26 PROCEDURE — 3075F PR MOST RECENT SYSTOLIC BLOOD PRESS GE 130-139MM HG: ICD-10-PCS | Mod: CPTII,S$GLB,, | Performed by: INTERNAL MEDICINE

## 2020-10-26 PROCEDURE — 99215 PR OFFICE/OUTPT VISIT, EST, LEVL V, 40-54 MIN: ICD-10-PCS | Mod: S$GLB,,, | Performed by: INTERNAL MEDICINE

## 2020-10-26 PROCEDURE — 99999 PR PBB SHADOW E&M-EST. PATIENT-LVL III: CPT | Mod: PBBFAC,,, | Performed by: INTERNAL MEDICINE

## 2020-10-26 RX ORDER — SODIUM CHLORIDE 0.9 % (FLUSH) 0.9 %
10 SYRINGE (ML) INJECTION
Status: CANCELLED | OUTPATIENT
Start: 2020-10-26

## 2020-10-26 RX ORDER — ACETAMINOPHEN 325 MG/1
650 TABLET ORAL
Status: CANCELLED | OUTPATIENT
Start: 2020-10-26

## 2020-10-26 RX ORDER — ORAL SEMAGLUTIDE 7 MG/1
7 TABLET ORAL DAILY
Qty: 30 TABLET | Refills: 4 | Status: SHIPPED | OUTPATIENT
Start: 2020-10-26 | End: 2021-03-22 | Stop reason: SDUPTHER

## 2020-10-26 RX ORDER — FAMOTIDINE 10 MG/ML
20 INJECTION INTRAVENOUS
Status: CANCELLED | OUTPATIENT
Start: 2020-10-26

## 2020-10-26 RX ORDER — OLOPATADINE HYDROCHLORIDE 2 MG/ML
SOLUTION/ DROPS OPHTHALMIC
COMMUNITY
Start: 2020-10-15 | End: 2021-02-01

## 2020-10-26 RX ORDER — HEPARIN 100 UNIT/ML
500 SYRINGE INTRAVENOUS
Status: CANCELLED | OUTPATIENT
Start: 2020-10-26

## 2020-10-26 NOTE — PROGRESS NOTES
CC I have had ITP    Aleksandra Keys is a 64 y.o.  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  His platelets seem to be ranging from 70,000 to 127,000.  He is a diabetic on metformin with latest hemoglobin A1c of 6.4.  He is tolerating lisinopril for hypertension and dyslipidemia stable with a statin.  He reports no bleeding, no melena or hematochezia no hemoptysis.  He had his spleen removed around 1962. He has not required Rituxan or steroids. He feels extremely fatigued when his platelets drop.     Past Medical History:   Diagnosis Date    Anxiety     Benign hypertension     Diabetes mellitus, type 2     Diverticulitis     Fatty liver     GERD (gastroesophageal reflux disease)     Hyperlipidemia     ITP (idiopathic thrombocytopenic purpura)     Occlusive coronary artery disease      Past Surgical History:   Procedure Laterality Date    COLONOSCOPY  04/11/2016    Dr. Abbott, multiple polyps, recheck 6 month    CORONARY ARTERY BYPASS GRAFT      EYE SURGERY      SPLENECTOMY, TOTAL         Current Outpatient Medications:     aspirin 81 mg Tab, Take 1 tablet by mouth once daily. , Disp: , Rfl:     atorvastatin (LIPITOR) 40 MG tablet, Take 1 tablet (40 mg total) by mouth every evening., Disp: 90 tablet, Rfl: 3    blood-glucose meter (ACCU-CHEK DUGLAS) Misc, Check blood sugar once daily, accu-check duglas glucometer kit with fastclix lancet device, Disp: 1 each, Rfl: 0    lancets (ACCU-CHEK FASTCLIX) Misc, Check sugar once daily, fastclix lancet drum, Disp: 50 each, Rfl: 11    lisinopriL (PRINIVIL,ZESTRIL) 2.5 MG tablet, Take 1 tablet (2.5 mg total) by mouth once daily., Disp: 90 tablet, Rfl: 3    metFORMIN (GLUCOPHAGE-XR) 500 MG XR 24hr tablet, TAKE 2 TABLETS BY MOUTH IN THE MORNING AND 1 TABLET BY MOUTH IN THE EVENING, Disp: 270 tablet, Rfl: 3    metoprolol succinate (TOPROL-XL) 25 MG 24 hr tablet, Take 1 tablet (25 mg total) by mouth once daily., Disp: 90 tablet, Rfl:  3    olopatadine (PATADAY) 0.2 % Drop, INSTILL 1 DROP INTO EACH EYE ONCE DAILY, Disp: , Rfl:     semaglutide (RYBELSUS) 3 mg tablet, Take 1 tablet (3 mg total) by mouth once daily., Disp: 30 tablet, Rfl: 0  Review of patient's allergies indicates:   Allergen Reactions    Dapagliflozin Other (See Comments)     myalgia    Niacin      Social History     Tobacco Use    Smoking status: Former Smoker     Quit date: 10/9/2010     Years since quitting: 10.0    Smokeless tobacco: Never Used   Substance Use Topics    Alcohol use: Yes    Drug use: No     Family History   Problem Relation Age of Onset    Diabetes Mother     Hypertension Mother     Skin cancer Mother     Diabetes Brother     Heart disease Brother     Hyperlipidemia Father     Hypertension Father     Diabetes Maternal Aunt     Heart disease Maternal Uncle     Cancer Paternal Aunt         thyroid    Heart disease Paternal Uncle     Cancer Paternal Uncle         lung    Diabetes Maternal Grandmother     Heart disease Maternal Grandfather     Heart disease Paternal Grandfather     No Known Problems Son     Melanoma Neg Hx     Psoriasis Neg Hx     Lupus Neg Hx     Eczema Neg Hx      Allergies intermittently   CONSTITUTIONAL: No fevers, chills, night sweats, wt. loss, appetite changes  SKIN: no rashes or itching  ENT: No headaches, head trauma, vision changes, or eye pain  LYMPH NODES: None noticed   CV: No chest pain, palpitations.   RESP: No dyspnea on exertion, cough, wheezing, or hemoptysis  GI: No nausea, emesis, diarrhea, constipation, melena, hematochezia, pain.   : No dysuria, hematuria, urgency, or frequency   HEME: No easy bruising, bleeding problems  PSYCHIATRIC: No depression, anxiety, psychosis, hallucinations.  NEURO: No seizures, memory loss, dizziness or difficulty sleeping  MSK: No arthralgias or joint swelling         /70 (BP Location: Left arm, Patient Position: Sitting, BP Method: Large (Automatic))   Pulse 65    Temp 97.8 °F (36.6 °C) (Temporal)   Resp 18   Wt 80.6 kg (177 lb 11.1 oz)   SpO2 95%   BMI 33.57 kg/m²   Gen: NAD, A and O x3,   Psych: pleasant affect, normal thought process  Eyes: Pupils round and non dilated, EOM intact  Nose: Nares patent  OP clear, mucosa patent  Neck: suppple, no JVD, trachea midline, no palpable mass, no adenopathy  Lungs: CTAB, no wheezes, no use of accessory muscles  CV: S1S2 with RRR, No mrg  Abd: soft, NTND, + BS, No HSM, no ascites  Extr: No CCE, DOTTY, strength normal, good capillary refill  Neuro: steady gait, CNs grossly intact  Skin: intact, no lesions noted  Rheum: No joint swelling  Cranial Nerves:      II: Pupillary reflexes normal     III, IV, VI: EOM normal     V: 1,2,3: normal sensation     VII: Normal strength in all divisions     IX, X: Normal voice, palatal elevation and sensation     XI: Shoulder strength normal       XII: Tongue mobility normal    Lab Results   Component Value Date    WBC 7.06 08/24/2020    HGB 16.6 08/24/2020    HCT 48.9 08/24/2020    MCV 99 (H) 08/24/2020     (L) 08/24/2020     CMP  Sodium   Date Value Ref Range Status   08/24/2020 138 136 - 145 mmol/L Final     Potassium   Date Value Ref Range Status   08/24/2020 4.0 3.5 - 5.1 mmol/L Final     Chloride   Date Value Ref Range Status   08/24/2020 104 95 - 110 mmol/L Final     CO2   Date Value Ref Range Status   08/24/2020 26 23 - 29 mmol/L Final     Glucose   Date Value Ref Range Status   08/24/2020 150 (H) 70 - 110 mg/dL Final     BUN, Bld   Date Value Ref Range Status   08/24/2020 20 8 - 23 mg/dL Final     Creatinine   Date Value Ref Range Status   08/24/2020 1.0 0.5 - 1.4 mg/dL Final   03/10/2013 1.1 0.5 - 1.4 mg/dL Final     Calcium   Date Value Ref Range Status   08/24/2020 8.7 8.7 - 10.5 mg/dL Final   03/10/2013 9.1 8.7 - 10.5 mg/dL Final     Total Protein   Date Value Ref Range Status   08/24/2020 6.9 6.0 - 8.4 g/dL Final     Albumin   Date Value Ref Range Status   08/24/2020 4.0 3.5 -  5.2 g/dL Final     Total Bilirubin   Date Value Ref Range Status   08/24/2020 0.9 0.1 - 1.0 mg/dL Final     Comment:     For infants and newborns, interpretation of results should be based  on gestational age, weight and in agreement with clinical  observations.  Premature Infant recommended reference ranges:  Up to 24 hours.............<8.0 mg/dL  Up to 48 hours............<12.0 mg/dL  3-5 days..................<15.0 mg/dL  6-29 days.................<15.0 mg/dL       Alkaline Phosphatase   Date Value Ref Range Status   08/24/2020 79 55 - 135 U/L Final   03/10/2013 49 (L) 55 - 135 U/L Final     AST   Date Value Ref Range Status   08/24/2020 21 10 - 40 U/L Final   03/10/2013 32 10 - 40 U/L Final     ALT   Date Value Ref Range Status   08/24/2020 26 10 - 44 U/L Final     Anion Gap   Date Value Ref Range Status   08/24/2020 8 8 - 16 mmol/L Final   03/10/2013 13 5 - 15 meq/L Final     eGFR if    Date Value Ref Range Status   08/24/2020 >60.0 >60 mL/min/1.73 m^2 Final     eGFR if non    Date Value Ref Range Status   08/24/2020 >60.0 >60 mL/min/1.73 m^2 Final     Comment:     Calculation used to obtain the estimated glomerular filtration  rate (eGFR) is the CKD-EPI equation.          Chronic sinusitis, unspecified location    Hypogammaglobulinemia    Low serum IgG2 subclass level    Chronic ITP (idiopathic thrombocytopenia)    Post-splenectomy    Colitis         Proceed with IVIG: This should help not only with his recurrent sinus infections and allergies and ITP   Cont lisinopril for HTn well controlled  Cont glucophage for DM montitored by PCP  RTC 1 month      Recommend healthy living: no nicotine,  should avoid alcohol, healthy diet and regular exercise aiming for fitness, and weight control  1. Discussed healthy alcohol daily limit of 0.5 oz of pure alcohol in any 24 hours (roughly one 12-oz beers, 4 oz of wine (8%-12% alcohol), or 1.25 oz (half a shot) of liquor (80 proof)), can not  save up.  2.   Discussed good exercise program, 4 key elements: 1. Aerobic (walking, swimming, dancing, jogging, biking, etc, 2. Muscle strengthening / resistance exercise, need to do 2-3 times  weekly, 3. Stretching daily, good stretch takes a whole  total minute. 4. Balance exercise daily.  3.   Discussed healthy diet for over 15 minutes with handouts given to the patient     Discussed COVID-19 and social distancing in great detail, avoid all non-essential visits out of the home if possible and avoid sick contacts.     Thank you for allowing me to evaluate and participate in the care of this pleasant patient. Please fell free to call me with any questions or concerns.    Warmly,  Genevieve Vicente MD    This note was dictated with Dragon and briefly proofread. Please excuse any sentences that may be unclear or words misspelled

## 2020-10-27 ENCOUNTER — TELEPHONE (OUTPATIENT)
Dept: INFUSION THERAPY | Facility: HOSPITAL | Age: 65
End: 2020-10-27

## 2020-10-27 ENCOUNTER — TELEPHONE (OUTPATIENT)
Dept: HEMATOLOGY/ONCOLOGY | Facility: CLINIC | Age: 65
End: 2020-10-27

## 2020-10-27 NOTE — TELEPHONE ENCOUNTER
Received confirmation from [facility] IVIG approved.    ----- Message from Victorino Contreras LPN sent at 10/26/2020  4:35 PM CDT -----  Regarding: RE: IVIG  Approved  ----- Message -----  From: Carmel Huang RN  Sent: 10/26/2020  12:50 PM CDT  To: Victorino Contreras LPN, Caitlin Saenz  Subject: IVIG                                             Hi,    I'm new at Katharina's office, [facility]! :)    Patient needs IVIG would you please get scheduled?    Thank you,    Carmel/Katharina

## 2020-10-29 ENCOUNTER — TELEPHONE (OUTPATIENT)
Dept: INFUSION THERAPY | Facility: HOSPITAL | Age: 65
End: 2020-10-29

## 2020-10-30 ENCOUNTER — TELEPHONE (OUTPATIENT)
Dept: HEMATOLOGY/ONCOLOGY | Facility: CLINIC | Age: 65
End: 2020-10-30

## 2020-10-30 NOTE — TELEPHONE ENCOUNTER
Pt upcoming ov w/ labs prior scheduled. Reviewed pt's appointments and confirmed IVIG scheduled also. Encouraged pt to ack via Patient Portal.    ----- Message from Yamini Youngblood, RN sent at 10/26/2020 11:51 AM CDT -----  Let's go over this together.   ----- Message -----  From: Genevieve Vicente MD  Sent: 10/26/2020  10:16 AM CDT  To: Yamini MALAVE Overall, RN    Please help him start IVIG and RTC one month with cbc

## 2020-11-09 ENCOUNTER — INFUSION (OUTPATIENT)
Dept: INFUSION THERAPY | Facility: HOSPITAL | Age: 65
End: 2020-11-09
Attending: INTERNAL MEDICINE
Payer: COMMERCIAL

## 2020-11-09 VITALS
BODY MASS INDEX: 33.61 KG/M2 | WEIGHT: 177.88 LBS | HEART RATE: 62 BPM | SYSTOLIC BLOOD PRESSURE: 124 MMHG | TEMPERATURE: 98 F | OXYGEN SATURATION: 94 % | RESPIRATION RATE: 18 BRPM | DIASTOLIC BLOOD PRESSURE: 68 MMHG

## 2020-11-09 DIAGNOSIS — D80.1 HYPOGAMMAGLOBULINEMIA: Primary | ICD-10-CM

## 2020-11-09 DIAGNOSIS — J32.9 CHRONIC SINUSITIS, UNSPECIFIED LOCATION: ICD-10-CM

## 2020-11-09 DIAGNOSIS — K52.9 COLITIS: ICD-10-CM

## 2020-11-09 PROCEDURE — 63600175 PHARM REV CODE 636 W HCPCS: Performed by: INTERNAL MEDICINE

## 2020-11-09 PROCEDURE — 96413 CHEMO IV INFUSION 1 HR: CPT

## 2020-11-09 PROCEDURE — 96367 TX/PROPH/DG ADDL SEQ IV INF: CPT

## 2020-11-09 PROCEDURE — 25000003 PHARM REV CODE 250: Performed by: INTERNAL MEDICINE

## 2020-11-09 PROCEDURE — 96375 TX/PRO/DX INJ NEW DRUG ADDON: CPT

## 2020-11-09 PROCEDURE — 96415 CHEMO IV INFUSION ADDL HR: CPT

## 2020-11-09 RX ORDER — ACETAMINOPHEN 325 MG/1
650 TABLET ORAL
Status: DISCONTINUED | OUTPATIENT
Start: 2020-11-09 | End: 2020-11-09 | Stop reason: HOSPADM

## 2020-11-09 RX ORDER — FAMOTIDINE 10 MG/ML
20 INJECTION INTRAVENOUS
Status: COMPLETED | OUTPATIENT
Start: 2020-11-09 | End: 2020-11-09

## 2020-11-09 RX ADMIN — FAMOTIDINE 20 MG: 10 INJECTION INTRAVENOUS at 09:11

## 2020-11-09 RX ADMIN — DIPHENHYDRAMINE HYDROCHLORIDE 50 MG: 50 INJECTION INTRAMUSCULAR; INTRAVENOUS at 09:11

## 2020-11-09 RX ADMIN — HUMAN IMMUNOGLOBULIN G 30 G: 20 LIQUID INTRAVENOUS at 09:11

## 2020-11-27 ENCOUNTER — LAB VISIT (OUTPATIENT)
Dept: LAB | Facility: HOSPITAL | Age: 65
End: 2020-11-27
Attending: INTERNAL MEDICINE
Payer: COMMERCIAL

## 2020-11-27 DIAGNOSIS — D69.3 CHRONIC ITP (IDIOPATHIC THROMBOCYTOPENIA): ICD-10-CM

## 2020-11-27 LAB
ALBUMIN SERPL BCP-MCNC: 4.1 G/DL (ref 3.5–5.2)
ALP SERPL-CCNC: 85 U/L (ref 55–135)
ALT SERPL W/O P-5'-P-CCNC: 25 U/L (ref 10–44)
ANION GAP SERPL CALC-SCNC: 9 MMOL/L (ref 8–16)
AST SERPL-CCNC: 16 U/L (ref 10–40)
BASOPHILS # BLD AUTO: 0.08 K/UL (ref 0–0.2)
BASOPHILS NFR BLD: 0.8 % (ref 0–1.9)
BILIRUB SERPL-MCNC: 0.7 MG/DL (ref 0.1–1)
BUN SERPL-MCNC: 16 MG/DL (ref 8–23)
CALCIUM SERPL-MCNC: 9.1 MG/DL (ref 8.7–10.5)
CHLORIDE SERPL-SCNC: 103 MMOL/L (ref 95–110)
CO2 SERPL-SCNC: 27 MMOL/L (ref 23–29)
CREAT SERPL-MCNC: 1 MG/DL (ref 0.5–1.4)
DIFFERENTIAL METHOD: ABNORMAL
EOSINOPHIL # BLD AUTO: 0.3 K/UL (ref 0–0.5)
EOSINOPHIL NFR BLD: 3.4 % (ref 0–8)
ERYTHROCYTE [DISTWIDTH] IN BLOOD BY AUTOMATED COUNT: 11.9 % (ref 11.5–14.5)
EST. GFR  (AFRICAN AMERICAN): >60 ML/MIN/1.73 M^2
EST. GFR  (NON AFRICAN AMERICAN): >60 ML/MIN/1.73 M^2
GLUCOSE SERPL-MCNC: 114 MG/DL (ref 70–110)
HCT VFR BLD AUTO: 48 % (ref 40–54)
HGB BLD-MCNC: 17 G/DL (ref 14–18)
IMM GRANULOCYTES # BLD AUTO: 0.04 K/UL (ref 0–0.04)
IMM GRANULOCYTES NFR BLD AUTO: 0.4 % (ref 0–0.5)
LYMPHOCYTES # BLD AUTO: 4.6 K/UL (ref 1–4.8)
LYMPHOCYTES NFR BLD: 46.7 % (ref 18–48)
MCH RBC QN AUTO: 34.2 PG (ref 27–31)
MCHC RBC AUTO-ENTMCNC: 35.4 G/DL (ref 32–36)
MCV RBC AUTO: 97 FL (ref 82–98)
MONOCYTES # BLD AUTO: 1.1 K/UL (ref 0.3–1)
MONOCYTES NFR BLD: 11.3 % (ref 4–15)
NEUTROPHILS # BLD AUTO: 3.7 K/UL (ref 1.8–7.7)
NEUTROPHILS NFR BLD: 37.4 % (ref 38–73)
NRBC BLD-RTO: 0 /100 WBC
PLATELET # BLD AUTO: 132 K/UL (ref 150–350)
PMV BLD AUTO: 10.8 FL (ref 9.2–12.9)
POTASSIUM SERPL-SCNC: 4.2 MMOL/L (ref 3.5–5.1)
PROT SERPL-MCNC: 7.1 G/DL (ref 6–8.4)
RBC # BLD AUTO: 4.97 M/UL (ref 4.6–6.2)
SODIUM SERPL-SCNC: 139 MMOL/L (ref 136–145)
WBC # BLD AUTO: 9.84 K/UL (ref 3.9–12.7)

## 2020-11-27 PROCEDURE — 80053 COMPREHEN METABOLIC PANEL: CPT

## 2020-11-27 PROCEDURE — 85025 COMPLETE CBC W/AUTO DIFF WBC: CPT

## 2020-11-27 PROCEDURE — 36415 COLL VENOUS BLD VENIPUNCTURE: CPT

## 2020-11-30 ENCOUNTER — OFFICE VISIT (OUTPATIENT)
Dept: HEMATOLOGY/ONCOLOGY | Facility: CLINIC | Age: 65
End: 2020-11-30
Payer: COMMERCIAL

## 2020-11-30 VITALS
BODY MASS INDEX: 33.42 KG/M2 | SYSTOLIC BLOOD PRESSURE: 136 MMHG | DIASTOLIC BLOOD PRESSURE: 78 MMHG | TEMPERATURE: 98 F | WEIGHT: 177 LBS | HEIGHT: 61 IN | HEART RATE: 58 BPM | OXYGEN SATURATION: 97 % | RESPIRATION RATE: 12 BRPM

## 2020-11-30 DIAGNOSIS — E78.5 HYPERLIPIDEMIA ASSOCIATED WITH TYPE 2 DIABETES MELLITUS: ICD-10-CM

## 2020-11-30 DIAGNOSIS — E11.69 HYPERLIPIDEMIA ASSOCIATED WITH TYPE 2 DIABETES MELLITUS: ICD-10-CM

## 2020-11-30 DIAGNOSIS — Z90.81 POST-SPLENECTOMY: ICD-10-CM

## 2020-11-30 DIAGNOSIS — D69.3 CHRONIC ITP (IDIOPATHIC THROMBOCYTOPENIA): Primary | ICD-10-CM

## 2020-11-30 DIAGNOSIS — D80.1 HYPOGAMMAGLOBULINEMIA: ICD-10-CM

## 2020-11-30 DIAGNOSIS — I10 HYPERTENSION, UNSPECIFIED TYPE: ICD-10-CM

## 2020-11-30 PROCEDURE — 3075F PR MOST RECENT SYSTOLIC BLOOD PRESS GE 130-139MM HG: ICD-10-PCS | Mod: CPTII,S$GLB,, | Performed by: INTERNAL MEDICINE

## 2020-11-30 PROCEDURE — 3008F PR BODY MASS INDEX (BMI) DOCUMENTED: ICD-10-PCS | Mod: CPTII,S$GLB,, | Performed by: INTERNAL MEDICINE

## 2020-11-30 PROCEDURE — 1126F AMNT PAIN NOTED NONE PRSNT: CPT | Mod: S$GLB,,, | Performed by: INTERNAL MEDICINE

## 2020-11-30 PROCEDURE — 3044F HG A1C LEVEL LT 7.0%: CPT | Mod: CPTII,S$GLB,, | Performed by: INTERNAL MEDICINE

## 2020-11-30 PROCEDURE — 99215 PR OFFICE/OUTPT VISIT, EST, LEVL V, 40-54 MIN: ICD-10-PCS | Mod: S$GLB,,, | Performed by: INTERNAL MEDICINE

## 2020-11-30 PROCEDURE — 3075F SYST BP GE 130 - 139MM HG: CPT | Mod: CPTII,S$GLB,, | Performed by: INTERNAL MEDICINE

## 2020-11-30 PROCEDURE — 3078F PR MOST RECENT DIASTOLIC BLOOD PRESSURE < 80 MM HG: ICD-10-PCS | Mod: CPTII,S$GLB,, | Performed by: INTERNAL MEDICINE

## 2020-11-30 PROCEDURE — 3044F PR MOST RECENT HEMOGLOBIN A1C LEVEL <7.0%: ICD-10-PCS | Mod: CPTII,S$GLB,, | Performed by: INTERNAL MEDICINE

## 2020-11-30 PROCEDURE — 99999 PR PBB SHADOW E&M-EST. PATIENT-LVL III: ICD-10-PCS | Mod: PBBFAC,,, | Performed by: INTERNAL MEDICINE

## 2020-11-30 PROCEDURE — 3078F DIAST BP <80 MM HG: CPT | Mod: CPTII,S$GLB,, | Performed by: INTERNAL MEDICINE

## 2020-11-30 PROCEDURE — 1126F PR PAIN SEVERITY QUANTIFIED, NO PAIN PRESENT: ICD-10-PCS | Mod: S$GLB,,, | Performed by: INTERNAL MEDICINE

## 2020-11-30 PROCEDURE — 3008F BODY MASS INDEX DOCD: CPT | Mod: CPTII,S$GLB,, | Performed by: INTERNAL MEDICINE

## 2020-11-30 PROCEDURE — 99999 PR PBB SHADOW E&M-EST. PATIENT-LVL III: CPT | Mod: PBBFAC,,, | Performed by: INTERNAL MEDICINE

## 2020-11-30 PROCEDURE — 99215 OFFICE O/P EST HI 40 MIN: CPT | Mod: S$GLB,,, | Performed by: INTERNAL MEDICINE

## 2020-11-30 RX ORDER — SODIUM CHLORIDE 0.9 % (FLUSH) 0.9 %
10 SYRINGE (ML) INJECTION
Status: CANCELLED | OUTPATIENT
Start: 2020-12-07

## 2020-11-30 RX ORDER — FAMOTIDINE 10 MG/ML
20 INJECTION INTRAVENOUS
Status: CANCELLED | OUTPATIENT
Start: 2020-12-07

## 2020-11-30 RX ORDER — HEPARIN 100 UNIT/ML
500 SYRINGE INTRAVENOUS
Status: CANCELLED | OUTPATIENT
Start: 2021-01-04

## 2020-11-30 RX ORDER — FAMOTIDINE 10 MG/ML
20 INJECTION INTRAVENOUS
Status: CANCELLED | OUTPATIENT
Start: 2021-01-04

## 2020-11-30 RX ORDER — ACETAMINOPHEN 325 MG/1
650 TABLET ORAL
Status: CANCELLED | OUTPATIENT
Start: 2020-12-07

## 2020-11-30 RX ORDER — SODIUM CHLORIDE 0.9 % (FLUSH) 0.9 %
10 SYRINGE (ML) INJECTION
Status: CANCELLED | OUTPATIENT
Start: 2021-01-04

## 2020-11-30 RX ORDER — HEPARIN 100 UNIT/ML
500 SYRINGE INTRAVENOUS
Status: CANCELLED | OUTPATIENT
Start: 2020-12-07

## 2020-11-30 RX ORDER — ACETAMINOPHEN 325 MG/1
650 TABLET ORAL
Status: CANCELLED | OUTPATIENT
Start: 2021-01-04

## 2020-11-30 NOTE — Clinical Note
Set up next IVIG December 7 and January 4 , RTC before January 4 with labs in [facility] and oV

## 2020-11-30 NOTE — PROGRESS NOTES
CC I have had ITP    Aleksandra Keys is a 65 y.o.  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  His platelets seem to be ranging from 70,000 to 127,000.  He is a diabetic on metformin with latest hemoglobin A1c of 6.4.  He is tolerating lisinopril for hypertension and dyslipidemia stable with a statin.  He reports no bleeding, no melena or hematochezia no hemoptysis.  He had his spleen removed around 1962. He has not required Rituxan or steroids. He feels extremely fatigued when his platelets drop.  Patient very much better since he started IVIG here for follow-up    Past Medical History:   Diagnosis Date    Anxiety     Benign hypertension     Diabetes mellitus, type 2     Diverticulitis     Fatty liver     GERD (gastroesophageal reflux disease)     Hyperlipidemia     ITP (idiopathic thrombocytopenic purpura)     Occlusive coronary artery disease      Past Surgical History:   Procedure Laterality Date    COLONOSCOPY  04/11/2016    Dr. Abbott, multiple polyps, recheck 6 month    CORONARY ARTERY BYPASS GRAFT      EYE SURGERY      SPLENECTOMY, TOTAL         Current Outpatient Medications:     aspirin 81 mg Tab, Take 1 tablet by mouth once daily. , Disp: , Rfl:     atorvastatin (LIPITOR) 40 MG tablet, Take 1 tablet (40 mg total) by mouth every evening., Disp: 90 tablet, Rfl: 3    blood-glucose meter (ACCU-CHEK DUGLAS) Misc, Check blood sugar once daily, accu-check duglas glucometer kit with fastclix lancet device, Disp: 1 each, Rfl: 0    lancets (ACCU-CHEK FASTCLIX) Misc, Check sugar once daily, fastclix lancet drum, Disp: 50 each, Rfl: 11    lisinopriL (PRINIVIL,ZESTRIL) 2.5 MG tablet, Take 1 tablet (2.5 mg total) by mouth once daily., Disp: 90 tablet, Rfl: 3    metFORMIN (GLUCOPHAGE-XR) 500 MG XR 24hr tablet, TAKE 2 TABLETS BY MOUTH IN THE MORNING AND 1 TABLET BY MOUTH IN THE EVENING, Disp: 270 tablet, Rfl: 3    metoprolol succinate (TOPROL-XL) 25 MG 24 hr tablet, Take 1  tablet (25 mg total) by mouth once daily., Disp: 90 tablet, Rfl: 3    olopatadine (PATADAY) 0.2 % Drop, INSTILL 1 DROP INTO EACH EYE ONCE DAILY, Disp: , Rfl:     semaglutide (RYBELSUS) 7 mg tablet, Take 1 tablet (7 mg total) by mouth once daily., Disp: 30 tablet, Rfl: 4  Review of patient's allergies indicates:   Allergen Reactions    Dapagliflozin Other (See Comments)     myalgia    Niacin      Social History     Tobacco Use    Smoking status: Former Smoker     Quit date: 10/9/2010     Years since quitting: 10.1    Smokeless tobacco: Never Used   Substance Use Topics    Alcohol use: Yes    Drug use: No     Family History   Problem Relation Age of Onset    Diabetes Mother     Hypertension Mother     Skin cancer Mother     Diabetes Brother     Heart disease Brother     Hyperlipidemia Father     Hypertension Father     Diabetes Maternal Aunt     Heart disease Maternal Uncle     Cancer Paternal Aunt         thyroid    Heart disease Paternal Uncle     Cancer Paternal Uncle         lung    Diabetes Maternal Grandmother     Heart disease Maternal Grandfather     Heart disease Paternal Grandfather     No Known Problems Son     Melanoma Neg Hx     Psoriasis Neg Hx     Lupus Neg Hx     Eczema Neg Hx      Allergies intermittently   CONSTITUTIONAL: No fevers, chills, night sweats, wt. loss, appetite changes  SKIN: no rashes or itching  ENT: No headaches, head trauma, vision changes, or eye pain  LYMPH NODES: None noticed   CV: No chest pain, palpitations.   RESP: No dyspnea on exertion, cough, wheezing, or hemoptysis  GI: No nausea, emesis, diarrhea, constipation, melena, hematochezia, pain.   : No dysuria, hematuria, urgency, or frequency   HEME: No easy bruising, bleeding problems  PSYCHIATRIC: No depression, anxiety, psychosis, hallucinations.  NEURO: No seizures, memory loss, dizziness or difficulty sleeping  MSK: No arthralgias or joint swelling         /78   Pulse (!) 58   Temp 98 °F  "(36.7 °C) (Oral)   Resp 12   Ht 5' 1" (1.549 m)   Wt 80.3 kg (177 lb 0.5 oz)   SpO2 97%   BMI 33.45 kg/m²   Gen: NAD, A and O x3,   Psych: pleasant affect, normal thought process  Eyes: Pupils round and non dilated, EOM intact  Nose: Nares patent  OP clear, mucosa patent  Neck: suppple, no JVD, trachea midline, no palpable mass, no adenopathy  Lungs: CTAB, no wheezes, no use of accessory muscles  CV: S1S2 with RRR, No mrg  Abd: soft, NTND, + BS, No HSM, no ascites  Extr: No CCE, DOTTY, strength normal, good capillary refill  Neuro: steady gait, CNs grossly intact  Skin: intact, no lesions noted  Rheum: No joint swelling  Cranial Nerves:      II: Pupillary reflexes normal     III, IV, VI: EOM normal     V: 1,2,3: normal sensation     VII: Normal strength in all divisions     IX, X: Normal voice, palatal elevation and sensation     XI: Shoulder strength normal       XII: Tongue mobility normal    Lab Results   Component Value Date    WBC 9.84 11/27/2020    HGB 17.0 11/27/2020    HCT 48.0 11/27/2020    MCV 97 11/27/2020     (L) 11/27/2020     CMP  Sodium   Date Value Ref Range Status   11/27/2020 139 136 - 145 mmol/L Final     Potassium   Date Value Ref Range Status   11/27/2020 4.2 3.5 - 5.1 mmol/L Final     Chloride   Date Value Ref Range Status   11/27/2020 103 95 - 110 mmol/L Final     CO2   Date Value Ref Range Status   11/27/2020 27 23 - 29 mmol/L Final     Glucose   Date Value Ref Range Status   11/27/2020 114 (H) 70 - 110 mg/dL Final     BUN   Date Value Ref Range Status   11/27/2020 16 8 - 23 mg/dL Final     Creatinine   Date Value Ref Range Status   11/27/2020 1.0 0.5 - 1.4 mg/dL Final   03/10/2013 1.1 0.5 - 1.4 mg/dL Final     Calcium   Date Value Ref Range Status   11/27/2020 9.1 8.7 - 10.5 mg/dL Final   03/10/2013 9.1 8.7 - 10.5 mg/dL Final     Total Protein   Date Value Ref Range Status   11/27/2020 7.1 6.0 - 8.4 g/dL Final     Albumin   Date Value Ref Range Status   11/27/2020 4.1 3.5 - 5.2 " g/dL Final     Total Bilirubin   Date Value Ref Range Status   11/27/2020 0.7 0.1 - 1.0 mg/dL Final     Comment:     For infants and newborns, interpretation of results should be based  on gestational age, weight and in agreement with clinical  observations.  Premature Infant recommended reference ranges:  Up to 24 hours.............<8.0 mg/dL  Up to 48 hours............<12.0 mg/dL  3-5 days..................<15.0 mg/dL  6-29 days.................<15.0 mg/dL       Alkaline Phosphatase   Date Value Ref Range Status   11/27/2020 85 55 - 135 U/L Final   03/10/2013 49 (L) 55 - 135 U/L Final     AST   Date Value Ref Range Status   11/27/2020 16 10 - 40 U/L Final   03/10/2013 32 10 - 40 U/L Final     ALT   Date Value Ref Range Status   11/27/2020 25 10 - 44 U/L Final     Anion Gap   Date Value Ref Range Status   11/27/2020 9 8 - 16 mmol/L Final   03/10/2013 13 5 - 15 meq/L Final     eGFR if    Date Value Ref Range Status   11/27/2020 >60 >60 mL/min/1.73 m^2 Final     eGFR if non    Date Value Ref Range Status   11/27/2020 >60 >60 mL/min/1.73 m^2 Final     Comment:     Calculation used to obtain the estimated glomerular filtration  rate (eGFR) is the CKD-EPI equation.          Chronic ITP (idiopathic thrombocytopenia)  -     CBC Oncology; Future; Expected date: 11/30/2020  -     CMP; Future; Expected date: 11/30/2020    Hypogammaglobulinemia    Post-splenectomy    Hypertension, unspecified type    Hyperlipidemia associated with type 2 diabetes mellitus         Proceed with IVIG Cleared for dec 7   Cont lisinopril for HTn well controlled  Cont glucophage for DM montitored by PCP  Repeat IVIG December 7 : this will be his second monthly infusion  Platelets should cont to increase  Re assess January 4 2020 with counts for cycle 3      Recommend healthy living: no nicotine,  should avoid alcohol, healthy diet and regular exercise aiming for fitness, and weight control  1. Discussed healthy  alcohol daily limit of 0.5 oz of pure alcohol in any 24 hours (roughly one 12-oz beers, 4 oz of wine (8%-12% alcohol), or 1.25 oz (half a shot) of liquor (80 proof)), can not save up.  2.   Discussed good exercise program, 4 key elements: 1. Aerobic (walking, swimming, dancing, jogging, biking, etc, 2. Muscle strengthening / resistance exercise, need to do 2-3 times  weekly, 3. Stretching daily, good stretch takes a whole  total minute. 4. Balance exercise daily.  3.   Discussed healthy diet for over 15 minutes with handouts given to the patient     Discussed COVID-19 and social distancing in great detail, avoid all non-essential visits out of the home if possible and avoid sick contacts.     Thank you for allowing me to evaluate and participate in the care of this pleasant patient. Please fell free to call me with any questions or concerns.    Warmly,  Genevieve Vicente MD    This note was dictated with Dragon and briefly proofread. Please excuse any sentences that may be unclear or words misspelled

## 2020-12-02 ENCOUNTER — TELEPHONE (OUTPATIENT)
Dept: INFUSION THERAPY | Facility: HOSPITAL | Age: 65
End: 2020-12-02

## 2020-12-02 ENCOUNTER — TELEPHONE (OUTPATIENT)
Dept: HEMATOLOGY/ONCOLOGY | Facility: CLINIC | Age: 65
End: 2020-12-02

## 2020-12-02 NOTE — PROGRESS NOTES
Pts spouse called back and we scheduled his IVIG for 12/7 @11am and second IVIG for 1/4 @10am.  Thank you

## 2020-12-02 NOTE — TELEPHONE ENCOUNTER
Portal msg'd pt letting him know his next lab draw and fu ov with junito have been scheduled. Encouraged pt to ack.

## 2020-12-02 NOTE — TELEPHONE ENCOUNTER
Left VM letting patient know his request for an infusion appt has been forwarded to [facility] Scheduling pool. Reviewed patient's chart with LILLY Silver to see if IVIG auth'd for patient. Sent Staff Msg to [facility]ino and [facility] to obtain auth and appt for pt. Left callback # for pt and encouraged to call back with any concerns.

## 2020-12-04 ENCOUNTER — TELEPHONE (OUTPATIENT)
Dept: INFUSION THERAPY | Facility: HOSPITAL | Age: 65
End: 2020-12-04

## 2020-12-07 ENCOUNTER — INFUSION (OUTPATIENT)
Dept: INFUSION THERAPY | Facility: HOSPITAL | Age: 65
End: 2020-12-07
Attending: INTERNAL MEDICINE
Payer: COMMERCIAL

## 2020-12-07 VITALS
HEIGHT: 61 IN | RESPIRATION RATE: 18 BRPM | WEIGHT: 174.25 LBS | TEMPERATURE: 98 F | DIASTOLIC BLOOD PRESSURE: 69 MMHG | BODY MASS INDEX: 32.9 KG/M2 | HEART RATE: 64 BPM | OXYGEN SATURATION: 98 % | SYSTOLIC BLOOD PRESSURE: 112 MMHG

## 2020-12-07 DIAGNOSIS — J32.9 CHRONIC SINUSITIS, UNSPECIFIED LOCATION: ICD-10-CM

## 2020-12-07 DIAGNOSIS — K52.9 COLITIS: ICD-10-CM

## 2020-12-07 DIAGNOSIS — D80.1 HYPOGAMMAGLOBULINEMIA: Primary | ICD-10-CM

## 2020-12-07 PROCEDURE — 25000003 PHARM REV CODE 250: Performed by: INTERNAL MEDICINE

## 2020-12-07 PROCEDURE — 96375 TX/PRO/DX INJ NEW DRUG ADDON: CPT

## 2020-12-07 PROCEDURE — 96415 CHEMO IV INFUSION ADDL HR: CPT

## 2020-12-07 PROCEDURE — 63600175 PHARM REV CODE 636 W HCPCS: Mod: JG | Performed by: INTERNAL MEDICINE

## 2020-12-07 PROCEDURE — 96413 CHEMO IV INFUSION 1 HR: CPT

## 2020-12-07 PROCEDURE — 96367 TX/PROPH/DG ADDL SEQ IV INF: CPT

## 2020-12-07 RX ORDER — FAMOTIDINE 10 MG/ML
20 INJECTION INTRAVENOUS
Status: COMPLETED | OUTPATIENT
Start: 2020-12-07 | End: 2020-12-07

## 2020-12-07 RX ORDER — SODIUM CHLORIDE 0.9 % (FLUSH) 0.9 %
10 SYRINGE (ML) INJECTION
Status: DISCONTINUED | OUTPATIENT
Start: 2020-12-07 | End: 2020-12-07 | Stop reason: HOSPADM

## 2020-12-07 RX ORDER — ACETAMINOPHEN 325 MG/1
650 TABLET ORAL
Status: COMPLETED | OUTPATIENT
Start: 2020-12-07 | End: 2020-12-07

## 2020-12-07 RX ORDER — HEPARIN 100 UNIT/ML
500 SYRINGE INTRAVENOUS
Status: DISCONTINUED | OUTPATIENT
Start: 2020-12-07 | End: 2020-12-07 | Stop reason: HOSPADM

## 2020-12-07 RX ADMIN — DIPHENHYDRAMINE HYDROCHLORIDE 50 MG: 50 INJECTION INTRAMUSCULAR; INTRAVENOUS at 08:12

## 2020-12-07 RX ADMIN — HUMAN IMMUNOGLOBULIN G 30 G: 20 LIQUID INTRAVENOUS at 08:12

## 2020-12-07 RX ADMIN — ACETAMINOPHEN 650 MG: 325 TABLET ORAL at 08:12

## 2020-12-07 RX ADMIN — FAMOTIDINE 20 MG: 10 INJECTION INTRAVENOUS at 08:12

## 2020-12-30 ENCOUNTER — LAB VISIT (OUTPATIENT)
Dept: LAB | Facility: HOSPITAL | Age: 65
End: 2020-12-30
Attending: INTERNAL MEDICINE
Payer: COMMERCIAL

## 2020-12-30 DIAGNOSIS — D69.3 CHRONIC ITP (IDIOPATHIC THROMBOCYTOPENIA): ICD-10-CM

## 2020-12-30 LAB
ALBUMIN SERPL BCP-MCNC: 4.1 G/DL (ref 3.5–5.2)
ALP SERPL-CCNC: 88 U/L (ref 55–135)
ALT SERPL W/O P-5'-P-CCNC: 31 U/L (ref 10–44)
ANION GAP SERPL CALC-SCNC: 14 MMOL/L (ref 8–16)
AST SERPL-CCNC: 25 U/L (ref 10–40)
BILIRUB SERPL-MCNC: 0.8 MG/DL (ref 0.1–1)
BUN SERPL-MCNC: 14 MG/DL (ref 8–23)
CALCIUM SERPL-MCNC: 8.9 MG/DL (ref 8.7–10.5)
CHLORIDE SERPL-SCNC: 99 MMOL/L (ref 95–110)
CO2 SERPL-SCNC: 25 MMOL/L (ref 23–29)
CREAT SERPL-MCNC: 1 MG/DL (ref 0.5–1.4)
ERYTHROCYTE [DISTWIDTH] IN BLOOD BY AUTOMATED COUNT: 12.3 % (ref 11.5–14.5)
EST. GFR  (AFRICAN AMERICAN): >60 ML/MIN/1.73 M^2
EST. GFR  (NON AFRICAN AMERICAN): >60 ML/MIN/1.73 M^2
GLUCOSE SERPL-MCNC: 147 MG/DL (ref 70–110)
HCT VFR BLD AUTO: 48.8 % (ref 40–54)
HGB BLD-MCNC: 17.1 G/DL (ref 14–18)
IMM GRANULOCYTES # BLD AUTO: 0.03 K/UL (ref 0–0.04)
MCH RBC QN AUTO: 34.1 PG (ref 27–31)
MCHC RBC AUTO-ENTMCNC: 35 G/DL (ref 32–36)
MCV RBC AUTO: 97 FL (ref 82–98)
NEUTROPHILS # BLD AUTO: 4.1 K/UL (ref 1.8–7.7)
PLATELET # BLD AUTO: 98 K/UL (ref 150–350)
PMV BLD AUTO: 10.5 FL (ref 9.2–12.9)
POTASSIUM SERPL-SCNC: 3.8 MMOL/L (ref 3.5–5.1)
PROT SERPL-MCNC: 7.3 G/DL (ref 6–8.4)
RBC # BLD AUTO: 5.01 M/UL (ref 4.6–6.2)
SODIUM SERPL-SCNC: 138 MMOL/L (ref 136–145)
WBC # BLD AUTO: 10.11 K/UL (ref 3.9–12.7)

## 2020-12-30 PROCEDURE — 80053 COMPREHEN METABOLIC PANEL: CPT

## 2020-12-30 PROCEDURE — 85027 COMPLETE CBC AUTOMATED: CPT

## 2020-12-31 ENCOUNTER — TELEPHONE (OUTPATIENT)
Dept: HEMATOLOGY/ONCOLOGY | Facility: CLINIC | Age: 65
End: 2020-12-31

## 2021-01-04 ENCOUNTER — OFFICE VISIT (OUTPATIENT)
Dept: HEMATOLOGY/ONCOLOGY | Facility: CLINIC | Age: 66
End: 2021-01-04
Payer: COMMERCIAL

## 2021-01-04 ENCOUNTER — INFUSION (OUTPATIENT)
Dept: INFUSION THERAPY | Facility: HOSPITAL | Age: 66
End: 2021-01-04
Attending: INTERNAL MEDICINE
Payer: COMMERCIAL

## 2021-01-04 ENCOUNTER — TELEPHONE (OUTPATIENT)
Dept: HEMATOLOGY/ONCOLOGY | Facility: CLINIC | Age: 66
End: 2021-01-04

## 2021-01-04 VITALS
TEMPERATURE: 98 F | DIASTOLIC BLOOD PRESSURE: 80 MMHG | SYSTOLIC BLOOD PRESSURE: 132 MMHG | BODY MASS INDEX: 33.55 KG/M2 | HEART RATE: 60 BPM | HEIGHT: 61 IN | WEIGHT: 177.69 LBS | RESPIRATION RATE: 18 BRPM | OXYGEN SATURATION: 96 %

## 2021-01-04 VITALS
WEIGHT: 178.5 LBS | OXYGEN SATURATION: 97 % | SYSTOLIC BLOOD PRESSURE: 155 MMHG | HEIGHT: 61 IN | DIASTOLIC BLOOD PRESSURE: 82 MMHG | HEART RATE: 58 BPM | BODY MASS INDEX: 33.7 KG/M2 | TEMPERATURE: 98 F | RESPIRATION RATE: 18 BRPM

## 2021-01-04 DIAGNOSIS — D80.1 HYPOGAMMAGLOBULINEMIA: ICD-10-CM

## 2021-01-04 DIAGNOSIS — K52.9 COLITIS: ICD-10-CM

## 2021-01-04 DIAGNOSIS — D69.3 CHRONIC ITP (IDIOPATHIC THROMBOCYTOPENIA): Primary | ICD-10-CM

## 2021-01-04 DIAGNOSIS — E11.69 HYPERLIPIDEMIA ASSOCIATED WITH TYPE 2 DIABETES MELLITUS: ICD-10-CM

## 2021-01-04 DIAGNOSIS — J32.9 CHRONIC SINUSITIS, UNSPECIFIED LOCATION: ICD-10-CM

## 2021-01-04 DIAGNOSIS — E78.5 HYPERLIPIDEMIA ASSOCIATED WITH TYPE 2 DIABETES MELLITUS: ICD-10-CM

## 2021-01-04 DIAGNOSIS — I10 HYPERTENSION, UNSPECIFIED TYPE: ICD-10-CM

## 2021-01-04 DIAGNOSIS — Z90.81 POST-SPLENECTOMY: ICD-10-CM

## 2021-01-04 DIAGNOSIS — D80.1 HYPOGAMMAGLOBULINEMIA: Primary | ICD-10-CM

## 2021-01-04 PROCEDURE — 3044F PR MOST RECENT HEMOGLOBIN A1C LEVEL <7.0%: ICD-10-PCS | Mod: CPTII,S$GLB,, | Performed by: INTERNAL MEDICINE

## 2021-01-04 PROCEDURE — 99215 PR OFFICE/OUTPT VISIT, EST, LEVL V, 40-54 MIN: ICD-10-PCS | Mod: S$GLB,,, | Performed by: INTERNAL MEDICINE

## 2021-01-04 PROCEDURE — 99999 PR PBB SHADOW E&M-EST. PATIENT-LVL IV: CPT | Mod: PBBFAC,,, | Performed by: INTERNAL MEDICINE

## 2021-01-04 PROCEDURE — 3079F PR MOST RECENT DIASTOLIC BLOOD PRESSURE 80-89 MM HG: ICD-10-PCS | Mod: CPTII,S$GLB,, | Performed by: INTERNAL MEDICINE

## 2021-01-04 PROCEDURE — 96413 CHEMO IV INFUSION 1 HR: CPT

## 2021-01-04 PROCEDURE — 25000003 PHARM REV CODE 250: Performed by: INTERNAL MEDICINE

## 2021-01-04 PROCEDURE — 96367 TX/PROPH/DG ADDL SEQ IV INF: CPT

## 2021-01-04 PROCEDURE — 96376 TX/PRO/DX INJ SAME DRUG ADON: CPT

## 2021-01-04 PROCEDURE — 3075F SYST BP GE 130 - 139MM HG: CPT | Mod: CPTII,S$GLB,, | Performed by: INTERNAL MEDICINE

## 2021-01-04 PROCEDURE — 3075F PR MOST RECENT SYSTOLIC BLOOD PRESS GE 130-139MM HG: ICD-10-PCS | Mod: CPTII,S$GLB,, | Performed by: INTERNAL MEDICINE

## 2021-01-04 PROCEDURE — 3008F BODY MASS INDEX DOCD: CPT | Mod: CPTII,S$GLB,, | Performed by: INTERNAL MEDICINE

## 2021-01-04 PROCEDURE — 3044F HG A1C LEVEL LT 7.0%: CPT | Mod: CPTII,S$GLB,, | Performed by: INTERNAL MEDICINE

## 2021-01-04 PROCEDURE — 96415 CHEMO IV INFUSION ADDL HR: CPT

## 2021-01-04 PROCEDURE — 63600175 PHARM REV CODE 636 W HCPCS: Mod: JG | Performed by: INTERNAL MEDICINE

## 2021-01-04 PROCEDURE — 99999 PR PBB SHADOW E&M-EST. PATIENT-LVL IV: ICD-10-PCS | Mod: PBBFAC,,, | Performed by: INTERNAL MEDICINE

## 2021-01-04 PROCEDURE — 1126F PR PAIN SEVERITY QUANTIFIED, NO PAIN PRESENT: ICD-10-PCS | Mod: S$GLB,,, | Performed by: INTERNAL MEDICINE

## 2021-01-04 PROCEDURE — 99215 OFFICE O/P EST HI 40 MIN: CPT | Mod: S$GLB,,, | Performed by: INTERNAL MEDICINE

## 2021-01-04 PROCEDURE — 3079F DIAST BP 80-89 MM HG: CPT | Mod: CPTII,S$GLB,, | Performed by: INTERNAL MEDICINE

## 2021-01-04 PROCEDURE — 3008F PR BODY MASS INDEX (BMI) DOCUMENTED: ICD-10-PCS | Mod: CPTII,S$GLB,, | Performed by: INTERNAL MEDICINE

## 2021-01-04 PROCEDURE — 1126F AMNT PAIN NOTED NONE PRSNT: CPT | Mod: S$GLB,,, | Performed by: INTERNAL MEDICINE

## 2021-01-04 RX ORDER — FAMOTIDINE 10 MG/ML
20 INJECTION INTRAVENOUS
Status: COMPLETED | OUTPATIENT
Start: 2021-01-04 | End: 2021-01-04

## 2021-01-04 RX ORDER — ACETAMINOPHEN 325 MG/1
650 TABLET ORAL
Status: COMPLETED | OUTPATIENT
Start: 2021-01-04 | End: 2021-01-04

## 2021-01-04 RX ORDER — SODIUM CHLORIDE 0.9 % (FLUSH) 0.9 %
10 SYRINGE (ML) INJECTION
Status: DISCONTINUED | OUTPATIENT
Start: 2021-01-04 | End: 2021-01-05 | Stop reason: HOSPADM

## 2021-01-04 RX ADMIN — HUMAN IMMUNOGLOBULIN G 30 G: 20 LIQUID INTRAVENOUS at 11:01

## 2021-01-04 RX ADMIN — DIPHENHYDRAMINE HYDROCHLORIDE 50 MG: 50 INJECTION INTRAMUSCULAR; INTRAVENOUS at 10:01

## 2021-01-04 RX ADMIN — ACETAMINOPHEN 650 MG: 325 TABLET ORAL at 10:01

## 2021-01-04 RX ADMIN — FAMOTIDINE 20 MG: 10 INJECTION INTRAVENOUS at 10:01

## 2021-01-24 ENCOUNTER — PATIENT MESSAGE (OUTPATIENT)
Dept: FAMILY MEDICINE | Facility: CLINIC | Age: 66
End: 2021-01-24

## 2021-01-29 ENCOUNTER — LAB VISIT (OUTPATIENT)
Dept: LAB | Facility: HOSPITAL | Age: 66
End: 2021-01-29
Attending: INTERNAL MEDICINE
Payer: COMMERCIAL

## 2021-01-29 DIAGNOSIS — D69.3 CHRONIC ITP (IDIOPATHIC THROMBOCYTOPENIA): ICD-10-CM

## 2021-01-29 LAB
ALBUMIN SERPL BCP-MCNC: 3.6 G/DL (ref 3.5–5.2)
ALP SERPL-CCNC: 78 U/L (ref 55–135)
ALT SERPL W/O P-5'-P-CCNC: 27 U/L (ref 10–44)
ANION GAP SERPL CALC-SCNC: 9 MMOL/L (ref 8–16)
AST SERPL-CCNC: 21 U/L (ref 10–40)
BASOPHILS # BLD AUTO: 0.08 K/UL (ref 0–0.2)
BASOPHILS NFR BLD: 0.9 % (ref 0–1.9)
BILIRUB SERPL-MCNC: 0.8 MG/DL (ref 0.1–1)
BUN SERPL-MCNC: 16 MG/DL (ref 8–23)
CALCIUM SERPL-MCNC: 8.6 MG/DL (ref 8.7–10.5)
CHLORIDE SERPL-SCNC: 101 MMOL/L (ref 95–110)
CO2 SERPL-SCNC: 27 MMOL/L (ref 23–29)
CREAT SERPL-MCNC: 0.9 MG/DL (ref 0.5–1.4)
DIFFERENTIAL METHOD: ABNORMAL
EOSINOPHIL # BLD AUTO: 0.3 K/UL (ref 0–0.5)
EOSINOPHIL NFR BLD: 3.2 % (ref 0–8)
ERYTHROCYTE [DISTWIDTH] IN BLOOD BY AUTOMATED COUNT: 12.3 % (ref 11.5–14.5)
EST. GFR  (AFRICAN AMERICAN): >60 ML/MIN/1.73 M^2
EST. GFR  (NON AFRICAN AMERICAN): >60 ML/MIN/1.73 M^2
GLUCOSE SERPL-MCNC: 142 MG/DL (ref 70–110)
HCT VFR BLD AUTO: 47.2 % (ref 40–54)
HGB BLD-MCNC: 16.3 G/DL (ref 14–18)
IMM GRANULOCYTES # BLD AUTO: 0.06 K/UL (ref 0–0.04)
IMM GRANULOCYTES NFR BLD AUTO: 0.7 % (ref 0–0.5)
LYMPHOCYTES # BLD AUTO: 3.9 K/UL (ref 1–4.8)
LYMPHOCYTES NFR BLD: 43.8 % (ref 18–48)
MCH RBC QN AUTO: 34 PG (ref 27–31)
MCHC RBC AUTO-ENTMCNC: 34.5 G/DL (ref 32–36)
MCV RBC AUTO: 98 FL (ref 82–98)
MONOCYTES # BLD AUTO: 0.7 K/UL (ref 0.3–1)
MONOCYTES NFR BLD: 8.4 % (ref 4–15)
NEUTROPHILS # BLD AUTO: 3.8 K/UL (ref 1.8–7.7)
NEUTROPHILS NFR BLD: 43 % (ref 38–73)
NRBC BLD-RTO: 0 /100 WBC
PLATELET # BLD AUTO: 246 K/UL (ref 150–350)
PMV BLD AUTO: 10 FL (ref 9.2–12.9)
POTASSIUM SERPL-SCNC: 4.2 MMOL/L (ref 3.5–5.1)
PROT SERPL-MCNC: 6.9 G/DL (ref 6–8.4)
RBC # BLD AUTO: 4.8 M/UL (ref 4.6–6.2)
SODIUM SERPL-SCNC: 137 MMOL/L (ref 136–145)
WBC # BLD AUTO: 8.81 K/UL (ref 3.9–12.7)

## 2021-01-29 PROCEDURE — 85025 COMPLETE CBC W/AUTO DIFF WBC: CPT

## 2021-01-29 PROCEDURE — 80053 COMPREHEN METABOLIC PANEL: CPT

## 2021-01-29 PROCEDURE — 36415 COLL VENOUS BLD VENIPUNCTURE: CPT

## 2021-02-01 ENCOUNTER — OFFICE VISIT (OUTPATIENT)
Dept: HEMATOLOGY/ONCOLOGY | Facility: CLINIC | Age: 66
End: 2021-02-01
Payer: COMMERCIAL

## 2021-02-01 ENCOUNTER — INFUSION (OUTPATIENT)
Dept: INFUSION THERAPY | Facility: HOSPITAL | Age: 66
End: 2021-02-01
Attending: INTERNAL MEDICINE
Payer: COMMERCIAL

## 2021-02-01 VITALS
BODY MASS INDEX: 33.22 KG/M2 | RESPIRATION RATE: 18 BRPM | HEIGHT: 61 IN | DIASTOLIC BLOOD PRESSURE: 85 MMHG | SYSTOLIC BLOOD PRESSURE: 134 MMHG | HEART RATE: 86 BPM | TEMPERATURE: 98 F | OXYGEN SATURATION: 98 % | WEIGHT: 175.94 LBS

## 2021-02-01 VITALS
BODY MASS INDEX: 33.16 KG/M2 | TEMPERATURE: 98 F | RESPIRATION RATE: 18 BRPM | HEIGHT: 61 IN | SYSTOLIC BLOOD PRESSURE: 136 MMHG | WEIGHT: 175.63 LBS | DIASTOLIC BLOOD PRESSURE: 78 MMHG | OXYGEN SATURATION: 96 % | HEART RATE: 64 BPM

## 2021-02-01 DIAGNOSIS — K52.9 COLITIS: ICD-10-CM

## 2021-02-01 DIAGNOSIS — R76.8 LOW SERUM IGG2 SUBCLASS LEVEL: ICD-10-CM

## 2021-02-01 DIAGNOSIS — D80.1 HYPOGAMMAGLOBULINEMIA: Primary | ICD-10-CM

## 2021-02-01 DIAGNOSIS — J32.9 CHRONIC SINUSITIS, UNSPECIFIED LOCATION: ICD-10-CM

## 2021-02-01 DIAGNOSIS — Z90.81 POST-SPLENECTOMY: ICD-10-CM

## 2021-02-01 DIAGNOSIS — D69.3 CHRONIC ITP (IDIOPATHIC THROMBOCYTOPENIA): Primary | ICD-10-CM

## 2021-02-01 PROCEDURE — 99214 PR OFFICE/OUTPT VISIT, EST, LEVL IV, 30-39 MIN: ICD-10-PCS | Mod: S$GLB,,, | Performed by: PHYSICIAN ASSISTANT

## 2021-02-01 PROCEDURE — 96413 CHEMO IV INFUSION 1 HR: CPT

## 2021-02-01 PROCEDURE — 99214 OFFICE O/P EST MOD 30 MIN: CPT | Mod: S$GLB,,, | Performed by: PHYSICIAN ASSISTANT

## 2021-02-01 PROCEDURE — 63600175 PHARM REV CODE 636 W HCPCS: Performed by: PHYSICIAN ASSISTANT

## 2021-02-01 PROCEDURE — 25000003 PHARM REV CODE 250: Performed by: PHYSICIAN ASSISTANT

## 2021-02-01 PROCEDURE — 96415 CHEMO IV INFUSION ADDL HR: CPT

## 2021-02-01 PROCEDURE — 96367 TX/PROPH/DG ADDL SEQ IV INF: CPT

## 2021-02-01 PROCEDURE — 96375 TX/PRO/DX INJ NEW DRUG ADDON: CPT

## 2021-02-01 PROCEDURE — 3079F PR MOST RECENT DIASTOLIC BLOOD PRESSURE 80-89 MM HG: ICD-10-PCS | Mod: CPTII,S$GLB,, | Performed by: PHYSICIAN ASSISTANT

## 2021-02-01 PROCEDURE — 99999 PR PBB SHADOW E&M-EST. PATIENT-LVL IV: CPT | Mod: PBBFAC,,, | Performed by: PHYSICIAN ASSISTANT

## 2021-02-01 PROCEDURE — 3075F SYST BP GE 130 - 139MM HG: CPT | Mod: CPTII,S$GLB,, | Performed by: PHYSICIAN ASSISTANT

## 2021-02-01 PROCEDURE — 3079F DIAST BP 80-89 MM HG: CPT | Mod: CPTII,S$GLB,, | Performed by: PHYSICIAN ASSISTANT

## 2021-02-01 PROCEDURE — 99999 PR PBB SHADOW E&M-EST. PATIENT-LVL IV: ICD-10-PCS | Mod: PBBFAC,,, | Performed by: PHYSICIAN ASSISTANT

## 2021-02-01 PROCEDURE — 3075F PR MOST RECENT SYSTOLIC BLOOD PRESS GE 130-139MM HG: ICD-10-PCS | Mod: CPTII,S$GLB,, | Performed by: PHYSICIAN ASSISTANT

## 2021-02-01 RX ORDER — HEPARIN 100 UNIT/ML
500 SYRINGE INTRAVENOUS
Status: CANCELLED | OUTPATIENT
Start: 2021-02-01

## 2021-02-01 RX ORDER — FAMOTIDINE 10 MG/ML
20 INJECTION INTRAVENOUS
Status: CANCELLED | OUTPATIENT
Start: 2021-02-01

## 2021-02-01 RX ORDER — ACETAMINOPHEN 325 MG/1
650 TABLET ORAL
Status: CANCELLED | OUTPATIENT
Start: 2021-02-01

## 2021-02-01 RX ORDER — ACETAMINOPHEN 325 MG/1
650 TABLET ORAL
Status: COMPLETED | OUTPATIENT
Start: 2021-02-01 | End: 2021-02-01

## 2021-02-01 RX ORDER — SODIUM CHLORIDE 0.9 % (FLUSH) 0.9 %
10 SYRINGE (ML) INJECTION
Status: CANCELLED | OUTPATIENT
Start: 2021-02-01

## 2021-02-01 RX ORDER — FAMOTIDINE 10 MG/ML
20 INJECTION INTRAVENOUS
Status: COMPLETED | OUTPATIENT
Start: 2021-02-01 | End: 2021-02-01

## 2021-02-01 RX ADMIN — FAMOTIDINE 20 MG: 10 INJECTION INTRAVENOUS at 09:02

## 2021-02-01 RX ADMIN — ACETAMINOPHEN 650 MG: 325 TABLET ORAL at 09:02

## 2021-02-01 RX ADMIN — DIPHENHYDRAMINE HYDROCHLORIDE 50 MG: 50 INJECTION INTRAMUSCULAR; INTRAVENOUS at 09:02

## 2021-02-01 RX ADMIN — HUMAN IMMUNOGLOBULIN G 30 G: 20 LIQUID INTRAVENOUS at 10:02

## 2021-02-09 LAB
LEFT EYE DM RETINOPATHY: POSITIVE
RIGHT EYE DM RETINOPATHY: POSITIVE

## 2021-02-10 ENCOUNTER — PATIENT OUTREACH (OUTPATIENT)
Dept: ADMINISTRATIVE | Facility: HOSPITAL | Age: 66
End: 2021-02-10

## 2021-02-24 ENCOUNTER — LAB VISIT (OUTPATIENT)
Dept: LAB | Facility: HOSPITAL | Age: 66
End: 2021-02-24
Attending: INTERNAL MEDICINE
Payer: COMMERCIAL

## 2021-02-24 DIAGNOSIS — D69.3 CHRONIC ITP (IDIOPATHIC THROMBOCYTOPENIA): ICD-10-CM

## 2021-02-24 DIAGNOSIS — R76.8 LOW SERUM IGG2 SUBCLASS LEVEL: ICD-10-CM

## 2021-02-24 DIAGNOSIS — Z90.81 POST-SPLENECTOMY: ICD-10-CM

## 2021-02-24 LAB
ALBUMIN SERPL BCP-MCNC: 4.1 G/DL (ref 3.5–5.2)
ALP SERPL-CCNC: 80 U/L (ref 55–135)
ALT SERPL W/O P-5'-P-CCNC: 30 U/L (ref 10–44)
ANION GAP SERPL CALC-SCNC: 8 MMOL/L (ref 8–16)
AST SERPL-CCNC: 24 U/L (ref 10–40)
BASOPHILS # BLD AUTO: 0.09 K/UL (ref 0–0.2)
BASOPHILS NFR BLD: 1 % (ref 0–1.9)
BILIRUB SERPL-MCNC: 1 MG/DL (ref 0.1–1)
BUN SERPL-MCNC: 13 MG/DL (ref 8–23)
CALCIUM SERPL-MCNC: 9.1 MG/DL (ref 8.7–10.5)
CHLORIDE SERPL-SCNC: 104 MMOL/L (ref 95–110)
CO2 SERPL-SCNC: 28 MMOL/L (ref 23–29)
CREAT SERPL-MCNC: 1 MG/DL (ref 0.5–1.4)
DIFFERENTIAL METHOD: ABNORMAL
EOSINOPHIL # BLD AUTO: 0.4 K/UL (ref 0–0.5)
EOSINOPHIL NFR BLD: 4.1 % (ref 0–8)
ERYTHROCYTE [DISTWIDTH] IN BLOOD BY AUTOMATED COUNT: 12.3 % (ref 11.5–14.5)
EST. GFR  (AFRICAN AMERICAN): >60 ML/MIN/1.73 M^2
EST. GFR  (NON AFRICAN AMERICAN): >60 ML/MIN/1.73 M^2
GLUCOSE SERPL-MCNC: 112 MG/DL (ref 70–110)
HCT VFR BLD AUTO: 47.4 % (ref 40–54)
HGB BLD-MCNC: 16.5 G/DL (ref 14–18)
IMM GRANULOCYTES # BLD AUTO: 0.03 K/UL (ref 0–0.04)
IMM GRANULOCYTES NFR BLD AUTO: 0.3 % (ref 0–0.5)
LYMPHOCYTES # BLD AUTO: 4.2 K/UL (ref 1–4.8)
LYMPHOCYTES NFR BLD: 44.4 % (ref 18–48)
MCH RBC QN AUTO: 34 PG (ref 27–31)
MCHC RBC AUTO-ENTMCNC: 34.8 G/DL (ref 32–36)
MCV RBC AUTO: 98 FL (ref 82–98)
MONOCYTES # BLD AUTO: 0.9 K/UL (ref 0.3–1)
MONOCYTES NFR BLD: 9.3 % (ref 4–15)
NEUTROPHILS # BLD AUTO: 3.9 K/UL (ref 1.8–7.7)
NEUTROPHILS NFR BLD: 40.9 % (ref 38–73)
NRBC BLD-RTO: 0 /100 WBC
PLATELET # BLD AUTO: 99 K/UL (ref 150–350)
PMV BLD AUTO: 10.9 FL (ref 9.2–12.9)
POTASSIUM SERPL-SCNC: 4.2 MMOL/L (ref 3.5–5.1)
PROT SERPL-MCNC: 7.1 G/DL (ref 6–8.4)
RBC # BLD AUTO: 4.85 M/UL (ref 4.6–6.2)
SODIUM SERPL-SCNC: 140 MMOL/L (ref 136–145)
WBC # BLD AUTO: 9.42 K/UL (ref 3.9–12.7)

## 2021-02-24 PROCEDURE — 82787 IGG 1 2 3 OR 4 EACH: CPT | Mod: 59

## 2021-02-24 PROCEDURE — 80053 COMPREHEN METABOLIC PANEL: CPT

## 2021-02-24 PROCEDURE — 85025 COMPLETE CBC W/AUTO DIFF WBC: CPT

## 2021-02-24 PROCEDURE — 36415 COLL VENOUS BLD VENIPUNCTURE: CPT

## 2021-02-26 LAB
IGG1 SER-MCNC: 689 MG/DL (ref 382–929)
IGG2 SER-MCNC: 291 MG/DL (ref 242–700)
IGG3 SER-MCNC: 17 MG/DL (ref 22–176)
IGG4 SER-MCNC: 53 MG/DL (ref 4–86)

## 2021-03-01 ENCOUNTER — INFUSION (OUTPATIENT)
Dept: INFUSION THERAPY | Facility: HOSPITAL | Age: 66
End: 2021-03-01
Attending: INTERNAL MEDICINE
Payer: COMMERCIAL

## 2021-03-01 ENCOUNTER — OFFICE VISIT (OUTPATIENT)
Dept: HEMATOLOGY/ONCOLOGY | Facility: CLINIC | Age: 66
End: 2021-03-01
Payer: COMMERCIAL

## 2021-03-01 VITALS
SYSTOLIC BLOOD PRESSURE: 144 MMHG | BODY MASS INDEX: 33.14 KG/M2 | HEIGHT: 61 IN | OXYGEN SATURATION: 94 % | DIASTOLIC BLOOD PRESSURE: 80 MMHG | RESPIRATION RATE: 16 BRPM | HEART RATE: 79 BPM | TEMPERATURE: 97 F | WEIGHT: 175.5 LBS

## 2021-03-01 VITALS
OXYGEN SATURATION: 95 % | HEIGHT: 61 IN | TEMPERATURE: 98 F | BODY MASS INDEX: 33.14 KG/M2 | WEIGHT: 175.5 LBS | DIASTOLIC BLOOD PRESSURE: 76 MMHG | RESPIRATION RATE: 18 BRPM | SYSTOLIC BLOOD PRESSURE: 134 MMHG | HEART RATE: 62 BPM

## 2021-03-01 DIAGNOSIS — D80.1 HYPOGAMMAGLOBULINEMIA: Primary | ICD-10-CM

## 2021-03-01 DIAGNOSIS — J32.9 CHRONIC SINUSITIS, UNSPECIFIED LOCATION: ICD-10-CM

## 2021-03-01 DIAGNOSIS — J30.1 SEASONAL ALLERGIC RHINITIS DUE TO POLLEN: ICD-10-CM

## 2021-03-01 DIAGNOSIS — J32.9 RECURRENT SINUSITIS: ICD-10-CM

## 2021-03-01 DIAGNOSIS — E11.69 HYPERLIPIDEMIA ASSOCIATED WITH TYPE 2 DIABETES MELLITUS: ICD-10-CM

## 2021-03-01 DIAGNOSIS — I10 HYPERTENSION, UNSPECIFIED TYPE: ICD-10-CM

## 2021-03-01 DIAGNOSIS — D69.3 CHRONIC ITP (IDIOPATHIC THROMBOCYTOPENIA): Primary | ICD-10-CM

## 2021-03-01 DIAGNOSIS — K52.9 COLITIS: ICD-10-CM

## 2021-03-01 DIAGNOSIS — E78.5 HYPERLIPIDEMIA ASSOCIATED WITH TYPE 2 DIABETES MELLITUS: ICD-10-CM

## 2021-03-01 DIAGNOSIS — Z90.81 POST-SPLENECTOMY: ICD-10-CM

## 2021-03-01 DIAGNOSIS — R76.8 LOW SERUM IGG2 SUBCLASS LEVEL: ICD-10-CM

## 2021-03-01 PROCEDURE — 96415 CHEMO IV INFUSION ADDL HR: CPT

## 2021-03-01 PROCEDURE — 25000003 PHARM REV CODE 250: Performed by: INTERNAL MEDICINE

## 2021-03-01 PROCEDURE — 3079F DIAST BP 80-89 MM HG: CPT | Mod: CPTII,S$GLB,, | Performed by: INTERNAL MEDICINE

## 2021-03-01 PROCEDURE — 99999 PR PBB SHADOW E&M-EST. PATIENT-LVL IV: CPT | Mod: PBBFAC,,, | Performed by: INTERNAL MEDICINE

## 2021-03-01 PROCEDURE — 3079F PR MOST RECENT DIASTOLIC BLOOD PRESSURE 80-89 MM HG: ICD-10-PCS | Mod: CPTII,S$GLB,, | Performed by: INTERNAL MEDICINE

## 2021-03-01 PROCEDURE — 3077F SYST BP >= 140 MM HG: CPT | Mod: CPTII,S$GLB,, | Performed by: INTERNAL MEDICINE

## 2021-03-01 PROCEDURE — 3044F PR MOST RECENT HEMOGLOBIN A1C LEVEL <7.0%: ICD-10-PCS | Mod: CPTII,S$GLB,, | Performed by: INTERNAL MEDICINE

## 2021-03-01 PROCEDURE — 3077F PR MOST RECENT SYSTOLIC BLOOD PRESSURE >= 140 MM HG: ICD-10-PCS | Mod: CPTII,S$GLB,, | Performed by: INTERNAL MEDICINE

## 2021-03-01 PROCEDURE — 99999 PR PBB SHADOW E&M-EST. PATIENT-LVL IV: ICD-10-PCS | Mod: PBBFAC,,, | Performed by: INTERNAL MEDICINE

## 2021-03-01 PROCEDURE — 3008F BODY MASS INDEX DOCD: CPT | Mod: CPTII,S$GLB,, | Performed by: INTERNAL MEDICINE

## 2021-03-01 PROCEDURE — 96367 TX/PROPH/DG ADDL SEQ IV INF: CPT

## 2021-03-01 PROCEDURE — 63600175 PHARM REV CODE 636 W HCPCS: Performed by: INTERNAL MEDICINE

## 2021-03-01 PROCEDURE — 96413 CHEMO IV INFUSION 1 HR: CPT

## 2021-03-01 PROCEDURE — 1126F AMNT PAIN NOTED NONE PRSNT: CPT | Mod: S$GLB,,, | Performed by: INTERNAL MEDICINE

## 2021-03-01 PROCEDURE — 3044F HG A1C LEVEL LT 7.0%: CPT | Mod: CPTII,S$GLB,, | Performed by: INTERNAL MEDICINE

## 2021-03-01 PROCEDURE — 1126F PR PAIN SEVERITY QUANTIFIED, NO PAIN PRESENT: ICD-10-PCS | Mod: S$GLB,,, | Performed by: INTERNAL MEDICINE

## 2021-03-01 PROCEDURE — 99215 OFFICE O/P EST HI 40 MIN: CPT | Mod: S$GLB,,, | Performed by: INTERNAL MEDICINE

## 2021-03-01 PROCEDURE — 99215 PR OFFICE/OUTPT VISIT, EST, LEVL V, 40-54 MIN: ICD-10-PCS | Mod: S$GLB,,, | Performed by: INTERNAL MEDICINE

## 2021-03-01 PROCEDURE — 96375 TX/PRO/DX INJ NEW DRUG ADDON: CPT

## 2021-03-01 PROCEDURE — 3008F PR BODY MASS INDEX (BMI) DOCUMENTED: ICD-10-PCS | Mod: CPTII,S$GLB,, | Performed by: INTERNAL MEDICINE

## 2021-03-01 RX ORDER — SODIUM CHLORIDE 0.9 % (FLUSH) 0.9 %
10 SYRINGE (ML) INJECTION
Status: CANCELLED | OUTPATIENT
Start: 2021-03-01

## 2021-03-01 RX ORDER — HEPARIN 100 UNIT/ML
500 SYRINGE INTRAVENOUS
Status: DISCONTINUED | OUTPATIENT
Start: 2021-03-01 | End: 2021-03-01 | Stop reason: HOSPADM

## 2021-03-01 RX ORDER — ACETAMINOPHEN 325 MG/1
650 TABLET ORAL
Status: CANCELLED | OUTPATIENT
Start: 2021-03-29

## 2021-03-01 RX ORDER — AZELASTINE 1 MG/ML
1 SPRAY, METERED NASAL 2 TIMES DAILY
Qty: 30 ML | Refills: 6 | Status: SHIPPED | OUTPATIENT
Start: 2021-03-01 | End: 2022-12-01

## 2021-03-01 RX ORDER — HEPARIN 100 UNIT/ML
500 SYRINGE INTRAVENOUS
Status: CANCELLED | OUTPATIENT
Start: 2021-03-01

## 2021-03-01 RX ORDER — FAMOTIDINE 10 MG/ML
20 INJECTION INTRAVENOUS
Status: CANCELLED | OUTPATIENT
Start: 2021-03-29

## 2021-03-01 RX ORDER — SODIUM CHLORIDE 0.9 % (FLUSH) 0.9 %
10 SYRINGE (ML) INJECTION
Status: DISCONTINUED | OUTPATIENT
Start: 2021-03-01 | End: 2021-03-01 | Stop reason: HOSPADM

## 2021-03-01 RX ORDER — ACETAMINOPHEN 325 MG/1
650 TABLET ORAL
Status: CANCELLED | OUTPATIENT
Start: 2021-03-01

## 2021-03-01 RX ORDER — SODIUM CHLORIDE 0.9 % (FLUSH) 0.9 %
10 SYRINGE (ML) INJECTION
Status: CANCELLED | OUTPATIENT
Start: 2021-03-29

## 2021-03-01 RX ORDER — HEPARIN 100 UNIT/ML
500 SYRINGE INTRAVENOUS
Status: CANCELLED | OUTPATIENT
Start: 2021-03-29

## 2021-03-01 RX ORDER — FAMOTIDINE 10 MG/ML
20 INJECTION INTRAVENOUS
Status: CANCELLED | OUTPATIENT
Start: 2021-03-01

## 2021-03-01 RX ORDER — MONTELUKAST SODIUM 10 MG/1
10 TABLET ORAL NIGHTLY
Qty: 30 TABLET | Refills: 6 | Status: SHIPPED | OUTPATIENT
Start: 2021-03-01 | End: 2021-03-31

## 2021-03-01 RX ORDER — ACETAMINOPHEN 325 MG/1
650 TABLET ORAL
Status: COMPLETED | OUTPATIENT
Start: 2021-03-01 | End: 2021-03-01

## 2021-03-01 RX ORDER — FAMOTIDINE 10 MG/ML
20 INJECTION INTRAVENOUS
Status: COMPLETED | OUTPATIENT
Start: 2021-03-01 | End: 2021-03-01

## 2021-03-01 RX ADMIN — ACETAMINOPHEN 650 MG: 325 TABLET ORAL at 10:03

## 2021-03-01 RX ADMIN — FAMOTIDINE 20 MG: 10 INJECTION INTRAVENOUS at 10:03

## 2021-03-01 RX ADMIN — HUMAN IMMUNOGLOBULIN G 30 G: 20 LIQUID INTRAVENOUS at 10:03

## 2021-03-01 RX ADMIN — DIPHENHYDRAMINE HYDROCHLORIDE 50 MG: 50 INJECTION INTRAMUSCULAR; INTRAVENOUS at 10:03

## 2021-03-15 ENCOUNTER — LAB VISIT (OUTPATIENT)
Dept: LAB | Facility: HOSPITAL | Age: 66
End: 2021-03-15
Attending: NURSE PRACTITIONER
Payer: COMMERCIAL

## 2021-03-15 DIAGNOSIS — E11.9 CONTROLLED TYPE 2 DIABETES MELLITUS WITHOUT COMPLICATION, WITHOUT LONG-TERM CURRENT USE OF INSULIN: ICD-10-CM

## 2021-03-15 LAB
ALBUMIN SERPL BCP-MCNC: 3.8 G/DL (ref 3.5–5.2)
ALP SERPL-CCNC: 86 U/L (ref 55–135)
ALT SERPL W/O P-5'-P-CCNC: 26 U/L (ref 10–44)
ANION GAP SERPL CALC-SCNC: 10 MMOL/L (ref 8–16)
AST SERPL-CCNC: 21 U/L (ref 10–40)
BILIRUB SERPL-MCNC: 0.5 MG/DL (ref 0.1–1)
BUN SERPL-MCNC: 21 MG/DL (ref 8–23)
CALCIUM SERPL-MCNC: 8.7 MG/DL (ref 8.7–10.5)
CHLORIDE SERPL-SCNC: 104 MMOL/L (ref 95–110)
CO2 SERPL-SCNC: 25 MMOL/L (ref 23–29)
CREAT SERPL-MCNC: 1 MG/DL (ref 0.5–1.4)
EST. GFR  (AFRICAN AMERICAN): >60 ML/MIN/1.73 M^2
EST. GFR  (NON AFRICAN AMERICAN): >60 ML/MIN/1.73 M^2
ESTIMATED AVG GLUCOSE: 126 MG/DL (ref 68–131)
GLUCOSE SERPL-MCNC: 154 MG/DL (ref 70–110)
HBA1C MFR BLD: 6 % (ref 4–5.6)
POTASSIUM SERPL-SCNC: 3.9 MMOL/L (ref 3.5–5.1)
PROT SERPL-MCNC: 7.1 G/DL (ref 6–8.4)
SODIUM SERPL-SCNC: 139 MMOL/L (ref 136–145)

## 2021-03-15 PROCEDURE — 36415 COLL VENOUS BLD VENIPUNCTURE: CPT | Mod: PO | Performed by: NURSE PRACTITIONER

## 2021-03-15 PROCEDURE — 80053 COMPREHEN METABOLIC PANEL: CPT | Performed by: NURSE PRACTITIONER

## 2021-03-15 PROCEDURE — 83036 HEMOGLOBIN GLYCOSYLATED A1C: CPT | Performed by: NURSE PRACTITIONER

## 2021-03-16 ENCOUNTER — TELEPHONE (OUTPATIENT)
Dept: FAMILY MEDICINE | Facility: CLINIC | Age: 66
End: 2021-03-16

## 2021-03-18 ENCOUNTER — TELEPHONE (OUTPATIENT)
Dept: FAMILY MEDICINE | Facility: CLINIC | Age: 66
End: 2021-03-18

## 2021-03-22 ENCOUNTER — OFFICE VISIT (OUTPATIENT)
Dept: FAMILY MEDICINE | Facility: CLINIC | Age: 66
End: 2021-03-22
Payer: COMMERCIAL

## 2021-03-22 VITALS
BODY MASS INDEX: 32.84 KG/M2 | HEIGHT: 61 IN | DIASTOLIC BLOOD PRESSURE: 86 MMHG | RESPIRATION RATE: 18 BRPM | HEART RATE: 71 BPM | SYSTOLIC BLOOD PRESSURE: 146 MMHG | OXYGEN SATURATION: 97 % | TEMPERATURE: 98 F | WEIGHT: 173.94 LBS

## 2021-03-22 DIAGNOSIS — E11.69 HYPERLIPIDEMIA ASSOCIATED WITH TYPE 2 DIABETES MELLITUS: ICD-10-CM

## 2021-03-22 DIAGNOSIS — E78.5 HYPERLIPIDEMIA ASSOCIATED WITH TYPE 2 DIABETES MELLITUS: ICD-10-CM

## 2021-03-22 DIAGNOSIS — E11.9 CONTROLLED TYPE 2 DIABETES MELLITUS WITHOUT COMPLICATION, WITHOUT LONG-TERM CURRENT USE OF INSULIN: Primary | ICD-10-CM

## 2021-03-22 DIAGNOSIS — E11.59 HYPERTENSION ASSOCIATED WITH DIABETES: ICD-10-CM

## 2021-03-22 DIAGNOSIS — I15.2 HYPERTENSION ASSOCIATED WITH DIABETES: ICD-10-CM

## 2021-03-22 DIAGNOSIS — Z12.11 COLON CANCER SCREENING: ICD-10-CM

## 2021-03-22 DIAGNOSIS — Z23 NEED FOR IMMUNIZATION AGAINST VIRAL HEPATITIS: ICD-10-CM

## 2021-03-22 PROCEDURE — 3008F PR BODY MASS INDEX (BMI) DOCUMENTED: ICD-10-PCS | Mod: CPTII,S$GLB,, | Performed by: NURSE PRACTITIONER

## 2021-03-22 PROCEDURE — 3008F BODY MASS INDEX DOCD: CPT | Mod: CPTII,S$GLB,, | Performed by: NURSE PRACTITIONER

## 2021-03-22 PROCEDURE — 99214 PR OFFICE/OUTPT VISIT, EST, LEVL IV, 30-39 MIN: ICD-10-PCS | Mod: S$GLB,,, | Performed by: NURSE PRACTITIONER

## 2021-03-22 PROCEDURE — 1126F PR PAIN SEVERITY QUANTIFIED, NO PAIN PRESENT: ICD-10-PCS | Mod: S$GLB,,, | Performed by: NURSE PRACTITIONER

## 2021-03-22 PROCEDURE — 1126F AMNT PAIN NOTED NONE PRSNT: CPT | Mod: S$GLB,,, | Performed by: NURSE PRACTITIONER

## 2021-03-22 PROCEDURE — 99214 OFFICE O/P EST MOD 30 MIN: CPT | Mod: S$GLB,,, | Performed by: NURSE PRACTITIONER

## 2021-03-22 RX ORDER — ORAL SEMAGLUTIDE 7 MG/1
7 TABLET ORAL DAILY
Qty: 30 TABLET | Refills: 5 | Status: SHIPPED | OUTPATIENT
Start: 2021-03-22 | End: 2022-09-06

## 2021-03-22 RX ORDER — LISINOPRIL 5 MG/1
5 TABLET ORAL DAILY
Qty: 90 TABLET | Refills: 3 | Status: ON HOLD | OUTPATIENT
Start: 2021-03-22 | End: 2022-09-09 | Stop reason: SDUPTHER

## 2021-03-23 ENCOUNTER — CLINICAL SUPPORT (OUTPATIENT)
Dept: INTERNAL MEDICINE | Facility: CLINIC | Age: 66
End: 2021-03-23
Payer: COMMERCIAL

## 2021-03-23 DIAGNOSIS — Z23 NEED FOR IMMUNIZATION AGAINST VIRAL HEPATITIS: ICD-10-CM

## 2021-03-23 PROCEDURE — 90471 IMMUNIZATION ADMIN: CPT | Mod: S$GLB,,, | Performed by: NURSE PRACTITIONER

## 2021-03-23 PROCEDURE — 99999 PR PBB SHADOW E&M-EST. PATIENT-LVL II: CPT | Mod: PBBFAC,,,

## 2021-03-23 PROCEDURE — 90636 HEP A/HEP B VACC ADULT IM: CPT | Mod: S$GLB,,, | Performed by: NURSE PRACTITIONER

## 2021-03-23 PROCEDURE — 99999 PR PBB SHADOW E&M-EST. PATIENT-LVL II: ICD-10-PCS | Mod: PBBFAC,,,

## 2021-03-23 PROCEDURE — 90636 HEPATITIS A HEPATITIS B COMBINED VACCINE IM: ICD-10-PCS | Mod: S$GLB,,, | Performed by: NURSE PRACTITIONER

## 2021-03-23 PROCEDURE — 90471 HEPATITIS A HEPATITIS B COMBINED VACCINE IM: ICD-10-PCS | Mod: S$GLB,,, | Performed by: NURSE PRACTITIONER

## 2021-03-25 ENCOUNTER — LAB VISIT (OUTPATIENT)
Dept: LAB | Facility: HOSPITAL | Age: 66
End: 2021-03-25
Attending: PHYSICIAN ASSISTANT
Payer: COMMERCIAL

## 2021-03-25 ENCOUNTER — PATIENT MESSAGE (OUTPATIENT)
Dept: HEMATOLOGY/ONCOLOGY | Facility: CLINIC | Age: 66
End: 2021-03-25

## 2021-03-25 DIAGNOSIS — J32.9 RECURRENT SINUSITIS: ICD-10-CM

## 2021-03-25 DIAGNOSIS — D69.3 CHRONIC ITP (IDIOPATHIC THROMBOCYTOPENIA): ICD-10-CM

## 2021-03-25 DIAGNOSIS — J30.1 SEASONAL ALLERGIC RHINITIS DUE TO POLLEN: ICD-10-CM

## 2021-03-25 LAB
ALBUMIN SERPL BCP-MCNC: 4.1 G/DL (ref 3.5–5.2)
ALP SERPL-CCNC: 80 U/L (ref 55–135)
ALT SERPL W/O P-5'-P-CCNC: 37 U/L (ref 10–44)
ANION GAP SERPL CALC-SCNC: 12 MMOL/L (ref 8–16)
AST SERPL-CCNC: 24 U/L (ref 10–40)
BASOPHILS # BLD AUTO: 0.13 K/UL (ref 0–0.2)
BASOPHILS NFR BLD: 1.3 % (ref 0–1.9)
BILIRUB SERPL-MCNC: 1.1 MG/DL (ref 0.1–1)
BUN SERPL-MCNC: 19 MG/DL (ref 8–23)
CALCIUM SERPL-MCNC: 9.2 MG/DL (ref 8.7–10.5)
CHLORIDE SERPL-SCNC: 101 MMOL/L (ref 95–110)
CO2 SERPL-SCNC: 27 MMOL/L (ref 23–29)
CREAT SERPL-MCNC: 1 MG/DL (ref 0.5–1.4)
DIFFERENTIAL METHOD: ABNORMAL
EOSINOPHIL # BLD AUTO: 0.3 K/UL (ref 0–0.5)
EOSINOPHIL NFR BLD: 2.6 % (ref 0–8)
ERYTHROCYTE [DISTWIDTH] IN BLOOD BY AUTOMATED COUNT: 12.1 % (ref 11.5–14.5)
ERYTHROCYTE [SEDIMENTATION RATE] IN BLOOD BY WESTERGREN METHOD: 5 MM/HR (ref 0–10)
EST. GFR  (AFRICAN AMERICAN): >60 ML/MIN/1.73 M^2
EST. GFR  (NON AFRICAN AMERICAN): >60 ML/MIN/1.73 M^2
GLUCOSE SERPL-MCNC: 133 MG/DL (ref 70–110)
HCT VFR BLD AUTO: 48.2 % (ref 40–54)
HGB BLD-MCNC: 17 G/DL (ref 14–18)
IMM GRANULOCYTES # BLD AUTO: 0.05 K/UL (ref 0–0.04)
IMM GRANULOCYTES NFR BLD AUTO: 0.5 % (ref 0–0.5)
LYMPHOCYTES # BLD AUTO: 4.2 K/UL (ref 1–4.8)
LYMPHOCYTES NFR BLD: 41.5 % (ref 18–48)
MCH RBC QN AUTO: 33.8 PG (ref 27–31)
MCHC RBC AUTO-ENTMCNC: 35.3 G/DL (ref 32–36)
MCV RBC AUTO: 96 FL (ref 82–98)
MONOCYTES # BLD AUTO: 1.1 K/UL (ref 0.3–1)
MONOCYTES NFR BLD: 10.8 % (ref 4–15)
NEUTROPHILS # BLD AUTO: 4.4 K/UL (ref 1.8–7.7)
NEUTROPHILS NFR BLD: 43.3 % (ref 38–73)
NRBC BLD-RTO: 0 /100 WBC
PLATELET # BLD AUTO: 100 K/UL (ref 150–350)
PMV BLD AUTO: 10.3 FL (ref 9.2–12.9)
POTASSIUM SERPL-SCNC: 4.2 MMOL/L (ref 3.5–5.1)
PROT SERPL-MCNC: 7.3 G/DL (ref 6–8.4)
RBC # BLD AUTO: 5.03 M/UL (ref 4.6–6.2)
SODIUM SERPL-SCNC: 140 MMOL/L (ref 136–145)
WBC # BLD AUTO: 10.14 K/UL (ref 3.9–12.7)

## 2021-03-25 PROCEDURE — 85651 RBC SED RATE NONAUTOMATED: CPT | Performed by: INTERNAL MEDICINE

## 2021-03-25 PROCEDURE — 36415 COLL VENOUS BLD VENIPUNCTURE: CPT | Performed by: INTERNAL MEDICINE

## 2021-03-25 PROCEDURE — 85025 COMPLETE CBC W/AUTO DIFF WBC: CPT | Performed by: INTERNAL MEDICINE

## 2021-03-25 PROCEDURE — 80053 COMPREHEN METABOLIC PANEL: CPT | Performed by: INTERNAL MEDICINE

## 2021-03-29 ENCOUNTER — OFFICE VISIT (OUTPATIENT)
Dept: HEMATOLOGY/ONCOLOGY | Facility: CLINIC | Age: 66
End: 2021-03-29
Payer: COMMERCIAL

## 2021-03-29 ENCOUNTER — INFUSION (OUTPATIENT)
Dept: INFUSION THERAPY | Facility: HOSPITAL | Age: 66
End: 2021-03-29
Attending: INTERNAL MEDICINE
Payer: COMMERCIAL

## 2021-03-29 VITALS
RESPIRATION RATE: 17 BRPM | TEMPERATURE: 98 F | DIASTOLIC BLOOD PRESSURE: 76 MMHG | BODY MASS INDEX: 33.79 KG/M2 | HEART RATE: 72 BPM | OXYGEN SATURATION: 94 % | SYSTOLIC BLOOD PRESSURE: 130 MMHG | HEIGHT: 61 IN | WEIGHT: 179 LBS

## 2021-03-29 VITALS
SYSTOLIC BLOOD PRESSURE: 118 MMHG | WEIGHT: 178.5 LBS | TEMPERATURE: 98 F | HEART RATE: 55 BPM | OXYGEN SATURATION: 96 % | DIASTOLIC BLOOD PRESSURE: 71 MMHG | BODY MASS INDEX: 33.73 KG/M2 | RESPIRATION RATE: 16 BRPM

## 2021-03-29 DIAGNOSIS — J32.9 CHRONIC SINUSITIS, UNSPECIFIED LOCATION: ICD-10-CM

## 2021-03-29 DIAGNOSIS — D69.3 IDIOPATHIC THROMBOCYTOPENIC PURPURA: Primary | ICD-10-CM

## 2021-03-29 DIAGNOSIS — D80.1 HYPOGAMMAGLOBULINEMIA: Primary | ICD-10-CM

## 2021-03-29 DIAGNOSIS — K52.9 COLITIS: ICD-10-CM

## 2021-03-29 DIAGNOSIS — D80.1 HYPOGAMMAGLOBULINEMIA: ICD-10-CM

## 2021-03-29 PROCEDURE — 96375 TX/PRO/DX INJ NEW DRUG ADDON: CPT

## 2021-03-29 PROCEDURE — 96367 TX/PROPH/DG ADDL SEQ IV INF: CPT

## 2021-03-29 PROCEDURE — 99213 OFFICE O/P EST LOW 20 MIN: CPT | Mod: S$GLB,,, | Performed by: PHYSICIAN ASSISTANT

## 2021-03-29 PROCEDURE — 3075F PR MOST RECENT SYSTOLIC BLOOD PRESS GE 130-139MM HG: ICD-10-PCS | Mod: CPTII,S$GLB,, | Performed by: PHYSICIAN ASSISTANT

## 2021-03-29 PROCEDURE — 99213 PR OFFICE/OUTPT VISIT, EST, LEVL III, 20-29 MIN: ICD-10-PCS | Mod: S$GLB,,, | Performed by: PHYSICIAN ASSISTANT

## 2021-03-29 PROCEDURE — 99999 PR PBB SHADOW E&M-EST. PATIENT-LVL V: CPT | Mod: PBBFAC,,, | Performed by: PHYSICIAN ASSISTANT

## 2021-03-29 PROCEDURE — 3078F DIAST BP <80 MM HG: CPT | Mod: CPTII,S$GLB,, | Performed by: PHYSICIAN ASSISTANT

## 2021-03-29 PROCEDURE — 3075F SYST BP GE 130 - 139MM HG: CPT | Mod: CPTII,S$GLB,, | Performed by: PHYSICIAN ASSISTANT

## 2021-03-29 PROCEDURE — 25000003 PHARM REV CODE 250: Performed by: INTERNAL MEDICINE

## 2021-03-29 PROCEDURE — 96413 CHEMO IV INFUSION 1 HR: CPT

## 2021-03-29 PROCEDURE — 3078F PR MOST RECENT DIASTOLIC BLOOD PRESSURE < 80 MM HG: ICD-10-PCS | Mod: CPTII,S$GLB,, | Performed by: PHYSICIAN ASSISTANT

## 2021-03-29 PROCEDURE — 96415 CHEMO IV INFUSION ADDL HR: CPT

## 2021-03-29 PROCEDURE — 63600175 PHARM REV CODE 636 W HCPCS: Mod: JG | Performed by: INTERNAL MEDICINE

## 2021-03-29 PROCEDURE — 99999 PR PBB SHADOW E&M-EST. PATIENT-LVL V: ICD-10-PCS | Mod: PBBFAC,,, | Performed by: PHYSICIAN ASSISTANT

## 2021-03-29 RX ORDER — FAMOTIDINE 10 MG/ML
20 INJECTION INTRAVENOUS
Status: COMPLETED | OUTPATIENT
Start: 2021-03-29 | End: 2021-03-29

## 2021-03-29 RX ORDER — ACETAMINOPHEN 325 MG/1
650 TABLET ORAL
Status: COMPLETED | OUTPATIENT
Start: 2021-03-29 | End: 2021-03-29

## 2021-03-29 RX ORDER — SODIUM CHLORIDE 0.9 % (FLUSH) 0.9 %
10 SYRINGE (ML) INJECTION
Status: DISCONTINUED | OUTPATIENT
Start: 2021-03-29 | End: 2021-03-29 | Stop reason: HOSPADM

## 2021-03-29 RX ADMIN — HUMAN IMMUNOGLOBULIN G 30 G: 20 LIQUID INTRAVENOUS at 11:03

## 2021-03-29 RX ADMIN — DIPHENHYDRAMINE HYDROCHLORIDE 50 MG: 50 INJECTION INTRAMUSCULAR; INTRAVENOUS at 10:03

## 2021-03-29 RX ADMIN — FAMOTIDINE 20 MG: 10 INJECTION INTRAVENOUS at 10:03

## 2021-03-29 RX ADMIN — ACETAMINOPHEN 650 MG: 325 TABLET ORAL at 10:03

## 2021-04-26 ENCOUNTER — LAB VISIT (OUTPATIENT)
Dept: LAB | Facility: HOSPITAL | Age: 66
End: 2021-04-26
Attending: PHYSICIAN ASSISTANT
Payer: COMMERCIAL

## 2021-04-26 DIAGNOSIS — D69.3 IDIOPATHIC THROMBOCYTOPENIC PURPURA: ICD-10-CM

## 2021-04-26 DIAGNOSIS — D80.1 HYPOGAMMAGLOBULINEMIA: ICD-10-CM

## 2021-04-26 LAB
ALBUMIN SERPL BCP-MCNC: 3.9 G/DL (ref 3.5–5.2)
ALP SERPL-CCNC: 74 U/L (ref 55–135)
ALT SERPL W/O P-5'-P-CCNC: 31 U/L (ref 10–44)
ANION GAP SERPL CALC-SCNC: 9 MMOL/L (ref 8–16)
AST SERPL-CCNC: 20 U/L (ref 10–40)
BASOPHILS # BLD AUTO: 0.09 K/UL (ref 0–0.2)
BASOPHILS NFR BLD: 1.1 % (ref 0–1.9)
BILIRUB SERPL-MCNC: 0.9 MG/DL (ref 0.1–1)
BUN SERPL-MCNC: 20 MG/DL (ref 8–23)
CALCIUM SERPL-MCNC: 8.8 MG/DL (ref 8.7–10.5)
CHLORIDE SERPL-SCNC: 103 MMOL/L (ref 95–110)
CO2 SERPL-SCNC: 27 MMOL/L (ref 23–29)
CREAT SERPL-MCNC: 1.1 MG/DL (ref 0.5–1.4)
DIFFERENTIAL METHOD: ABNORMAL
EOSINOPHIL # BLD AUTO: 0.3 K/UL (ref 0–0.5)
EOSINOPHIL NFR BLD: 3.9 % (ref 0–8)
ERYTHROCYTE [DISTWIDTH] IN BLOOD BY AUTOMATED COUNT: 12.2 % (ref 11.5–14.5)
EST. GFR  (AFRICAN AMERICAN): >60 ML/MIN/1.73 M^2
EST. GFR  (NON AFRICAN AMERICAN): >60 ML/MIN/1.73 M^2
GLUCOSE SERPL-MCNC: 186 MG/DL (ref 70–110)
HCT VFR BLD AUTO: 47.3 % (ref 40–54)
HGB BLD-MCNC: 16.3 G/DL (ref 14–18)
IMM GRANULOCYTES # BLD AUTO: 0.04 K/UL (ref 0–0.04)
IMM GRANULOCYTES NFR BLD AUTO: 0.5 % (ref 0–0.5)
LYMPHOCYTES # BLD AUTO: 3.5 K/UL (ref 1–4.8)
LYMPHOCYTES NFR BLD: 42.8 % (ref 18–48)
MCH RBC QN AUTO: 34 PG (ref 27–31)
MCHC RBC AUTO-ENTMCNC: 34.5 G/DL (ref 32–36)
MCV RBC AUTO: 99 FL (ref 82–98)
MONOCYTES # BLD AUTO: 0.9 K/UL (ref 0.3–1)
MONOCYTES NFR BLD: 11.4 % (ref 4–15)
NEUTROPHILS # BLD AUTO: 3.3 K/UL (ref 1.8–7.7)
NEUTROPHILS NFR BLD: 40.3 % (ref 38–73)
NRBC BLD-RTO: 0 /100 WBC
PLATELET # BLD AUTO: 85 K/UL (ref 150–450)
PMV BLD AUTO: 11.3 FL (ref 9.2–12.9)
POTASSIUM SERPL-SCNC: 4.2 MMOL/L (ref 3.5–5.1)
PROT SERPL-MCNC: 6.9 G/DL (ref 6–8.4)
RBC # BLD AUTO: 4.8 M/UL (ref 4.6–6.2)
SODIUM SERPL-SCNC: 139 MMOL/L (ref 136–145)
WBC # BLD AUTO: 8.14 K/UL (ref 3.9–12.7)

## 2021-04-26 PROCEDURE — 36415 COLL VENOUS BLD VENIPUNCTURE: CPT | Performed by: PHYSICIAN ASSISTANT

## 2021-04-26 PROCEDURE — 85025 COMPLETE CBC W/AUTO DIFF WBC: CPT | Performed by: PHYSICIAN ASSISTANT

## 2021-04-26 PROCEDURE — 80053 COMPREHEN METABOLIC PANEL: CPT | Performed by: PHYSICIAN ASSISTANT

## 2021-04-29 ENCOUNTER — OFFICE VISIT (OUTPATIENT)
Dept: HEMATOLOGY/ONCOLOGY | Facility: CLINIC | Age: 66
End: 2021-04-29
Payer: COMMERCIAL

## 2021-04-29 VITALS
WEIGHT: 179 LBS | OXYGEN SATURATION: 95 % | HEART RATE: 90 BPM | RESPIRATION RATE: 15 BRPM | HEIGHT: 61 IN | TEMPERATURE: 97 F | DIASTOLIC BLOOD PRESSURE: 74 MMHG | BODY MASS INDEX: 33.79 KG/M2 | SYSTOLIC BLOOD PRESSURE: 135 MMHG

## 2021-04-29 DIAGNOSIS — E11.9 CONTROLLED TYPE 2 DIABETES MELLITUS WITHOUT COMPLICATION, WITHOUT LONG-TERM CURRENT USE OF INSULIN: ICD-10-CM

## 2021-04-29 DIAGNOSIS — D80.3 IGG DEFICIENCY: Primary | ICD-10-CM

## 2021-04-29 DIAGNOSIS — D69.3 IDIOPATHIC THROMBOCYTOPENIC PURPURA: ICD-10-CM

## 2021-04-29 PROCEDURE — 99999 PR PBB SHADOW E&M-EST. PATIENT-LVL V: ICD-10-PCS | Mod: PBBFAC,,, | Performed by: INTERNAL MEDICINE

## 2021-04-29 PROCEDURE — 1126F PR PAIN SEVERITY QUANTIFIED, NO PAIN PRESENT: ICD-10-PCS | Mod: S$GLB,,, | Performed by: INTERNAL MEDICINE

## 2021-04-29 PROCEDURE — 3008F PR BODY MASS INDEX (BMI) DOCUMENTED: ICD-10-PCS | Mod: CPTII,S$GLB,, | Performed by: INTERNAL MEDICINE

## 2021-04-29 PROCEDURE — 3008F BODY MASS INDEX DOCD: CPT | Mod: CPTII,S$GLB,, | Performed by: INTERNAL MEDICINE

## 2021-04-29 PROCEDURE — 99214 PR OFFICE/OUTPT VISIT, EST, LEVL IV, 30-39 MIN: ICD-10-PCS | Mod: S$GLB,,, | Performed by: INTERNAL MEDICINE

## 2021-04-29 PROCEDURE — 1126F AMNT PAIN NOTED NONE PRSNT: CPT | Mod: S$GLB,,, | Performed by: INTERNAL MEDICINE

## 2021-04-29 PROCEDURE — 99214 OFFICE O/P EST MOD 30 MIN: CPT | Mod: S$GLB,,, | Performed by: INTERNAL MEDICINE

## 2021-04-29 PROCEDURE — 99999 PR PBB SHADOW E&M-EST. PATIENT-LVL V: CPT | Mod: PBBFAC,,, | Performed by: INTERNAL MEDICINE

## 2021-05-04 ENCOUNTER — LAB VISIT (OUTPATIENT)
Dept: LAB | Facility: HOSPITAL | Age: 66
End: 2021-05-04
Attending: INTERNAL MEDICINE
Payer: COMMERCIAL

## 2021-05-04 DIAGNOSIS — E11.9 CONTROLLED TYPE 2 DIABETES MELLITUS WITHOUT COMPLICATION, WITHOUT LONG-TERM CURRENT USE OF INSULIN: ICD-10-CM

## 2021-05-04 DIAGNOSIS — D69.3 IDIOPATHIC THROMBOCYTOPENIC PURPURA: ICD-10-CM

## 2021-05-04 LAB
ALBUMIN SERPL BCP-MCNC: 3.9 G/DL (ref 3.5–5.2)
ALP SERPL-CCNC: 81 U/L (ref 55–135)
ALT SERPL W/O P-5'-P-CCNC: 29 U/L (ref 10–44)
ANION GAP SERPL CALC-SCNC: 8 MMOL/L (ref 8–16)
AST SERPL-CCNC: 21 U/L (ref 10–40)
BASOPHILS # BLD AUTO: 0.1 K/UL (ref 0–0.2)
BASOPHILS NFR BLD: 1.2 % (ref 0–1.9)
BILIRUB SERPL-MCNC: 0.9 MG/DL (ref 0.1–1)
BUN SERPL-MCNC: 17 MG/DL (ref 8–23)
CALCIUM SERPL-MCNC: 9.2 MG/DL (ref 8.7–10.5)
CHLORIDE SERPL-SCNC: 103 MMOL/L (ref 95–110)
CO2 SERPL-SCNC: 28 MMOL/L (ref 23–29)
CREAT SERPL-MCNC: 1 MG/DL (ref 0.5–1.4)
DIFFERENTIAL METHOD: ABNORMAL
EOSINOPHIL # BLD AUTO: 0.3 K/UL (ref 0–0.5)
EOSINOPHIL NFR BLD: 3.9 % (ref 0–8)
ERYTHROCYTE [DISTWIDTH] IN BLOOD BY AUTOMATED COUNT: 12.1 % (ref 11.5–14.5)
EST. GFR  (AFRICAN AMERICAN): >60 ML/MIN/1.73 M^2
EST. GFR  (NON AFRICAN AMERICAN): >60 ML/MIN/1.73 M^2
GLUCOSE SERPL-MCNC: 212 MG/DL (ref 70–110)
HCT VFR BLD AUTO: 48 % (ref 40–54)
HGB BLD-MCNC: 16.6 G/DL (ref 14–18)
IMM GRANULOCYTES # BLD AUTO: 0.05 K/UL (ref 0–0.04)
IMM GRANULOCYTES NFR BLD AUTO: 0.6 % (ref 0–0.5)
LYMPHOCYTES # BLD AUTO: 3.2 K/UL (ref 1–4.8)
LYMPHOCYTES NFR BLD: 40.3 % (ref 18–48)
MCH RBC QN AUTO: 34.4 PG (ref 27–31)
MCHC RBC AUTO-ENTMCNC: 34.6 G/DL (ref 32–36)
MCV RBC AUTO: 99 FL (ref 82–98)
MONOCYTES # BLD AUTO: 0.9 K/UL (ref 0.3–1)
MONOCYTES NFR BLD: 11 % (ref 4–15)
NEUTROPHILS # BLD AUTO: 3.5 K/UL (ref 1.8–7.7)
NEUTROPHILS NFR BLD: 43 % (ref 38–73)
NRBC BLD-RTO: 0 /100 WBC
PLATELET # BLD AUTO: 109 K/UL (ref 150–450)
PMV BLD AUTO: 10.7 FL (ref 9.2–12.9)
POTASSIUM SERPL-SCNC: 4.4 MMOL/L (ref 3.5–5.1)
PROT SERPL-MCNC: 6.9 G/DL (ref 6–8.4)
RBC # BLD AUTO: 4.83 M/UL (ref 4.6–6.2)
SODIUM SERPL-SCNC: 139 MMOL/L (ref 136–145)
WBC # BLD AUTO: 8.03 K/UL (ref 3.9–12.7)

## 2021-05-04 PROCEDURE — 80053 COMPREHEN METABOLIC PANEL: CPT | Performed by: INTERNAL MEDICINE

## 2021-05-04 PROCEDURE — 85025 COMPLETE CBC W/AUTO DIFF WBC: CPT | Performed by: INTERNAL MEDICINE

## 2021-05-04 PROCEDURE — 86022 PLATELET ANTIBODIES: CPT | Mod: 59 | Performed by: INTERNAL MEDICINE

## 2021-05-04 PROCEDURE — 86703 HIV-1/HIV-2 1 RESULT ANTBDY: CPT | Performed by: INTERNAL MEDICINE

## 2021-05-04 PROCEDURE — 80074 ACUTE HEPATITIS PANEL: CPT | Performed by: INTERNAL MEDICINE

## 2021-05-04 PROCEDURE — 82784 ASSAY IGA/IGD/IGG/IGM EACH: CPT | Mod: 59 | Performed by: INTERNAL MEDICINE

## 2021-05-05 LAB
HAV IGM SERPL QL IA: POSITIVE
HBV CORE IGM SERPL QL IA: NEGATIVE
HBV SURFACE AG SERPL QL IA: NEGATIVE
HCV AB SERPL QL IA: NEGATIVE
HIV 1+2 AB+HIV1 P24 AG SERPL QL IA: NEGATIVE
IGA SERPL-MCNC: 119 MG/DL (ref 40–350)
IGG SERPL-MCNC: 1057 MG/DL (ref 650–1600)
IGM SERPL-MCNC: 44 MG/DL (ref 50–300)

## 2021-05-06 ENCOUNTER — PATIENT MESSAGE (OUTPATIENT)
Dept: FAMILY MEDICINE | Facility: CLINIC | Age: 66
End: 2021-05-06

## 2021-05-06 ENCOUNTER — OFFICE VISIT (OUTPATIENT)
Dept: HEMATOLOGY/ONCOLOGY | Facility: CLINIC | Age: 66
End: 2021-05-06
Payer: COMMERCIAL

## 2021-05-06 VITALS
RESPIRATION RATE: 14 BRPM | WEIGHT: 180.56 LBS | SYSTOLIC BLOOD PRESSURE: 127 MMHG | HEART RATE: 67 BPM | OXYGEN SATURATION: 94 % | TEMPERATURE: 98 F | BODY MASS INDEX: 34.09 KG/M2 | HEIGHT: 61 IN | DIASTOLIC BLOOD PRESSURE: 78 MMHG

## 2021-05-06 DIAGNOSIS — R76.8 HEPATITIS A ANTIBODY POSITIVE: ICD-10-CM

## 2021-05-06 DIAGNOSIS — D69.3 IDIOPATHIC THROMBOCYTOPENIC PURPURA: Primary | ICD-10-CM

## 2021-05-06 PROCEDURE — 99999 PR PBB SHADOW E&M-EST. PATIENT-LVL III: ICD-10-PCS | Mod: PBBFAC,,, | Performed by: INTERNAL MEDICINE

## 2021-05-06 PROCEDURE — 1126F PR PAIN SEVERITY QUANTIFIED, NO PAIN PRESENT: ICD-10-PCS | Mod: S$GLB,,, | Performed by: INTERNAL MEDICINE

## 2021-05-06 PROCEDURE — 99214 PR OFFICE/OUTPT VISIT, EST, LEVL IV, 30-39 MIN: ICD-10-PCS | Mod: S$GLB,,, | Performed by: INTERNAL MEDICINE

## 2021-05-06 PROCEDURE — 99214 OFFICE O/P EST MOD 30 MIN: CPT | Mod: S$GLB,,, | Performed by: INTERNAL MEDICINE

## 2021-05-06 PROCEDURE — 99999 PR PBB SHADOW E&M-EST. PATIENT-LVL III: CPT | Mod: PBBFAC,,, | Performed by: INTERNAL MEDICINE

## 2021-05-06 PROCEDURE — 3008F PR BODY MASS INDEX (BMI) DOCUMENTED: ICD-10-PCS | Mod: CPTII,S$GLB,, | Performed by: INTERNAL MEDICINE

## 2021-05-06 PROCEDURE — 3008F BODY MASS INDEX DOCD: CPT | Mod: CPTII,S$GLB,, | Performed by: INTERNAL MEDICINE

## 2021-05-06 PROCEDURE — 1126F AMNT PAIN NOTED NONE PRSNT: CPT | Mod: S$GLB,,, | Performed by: INTERNAL MEDICINE

## 2021-05-11 LAB
HLA AB SER QL IA: NEGATIVE
PLAT GP IA/IIA AB SER QL IA: NEGATIVE
PLAT GP IB/IX AB SER QL IA: NEGATIVE
PLAT GP IIB/IIIA AB SER QL IA: NEGATIVE
PLATELET AB INTERPRETATION: NORMAL
PLT AB: GP IV: NEGATIVE

## 2021-05-20 ENCOUNTER — OFFICE VISIT (OUTPATIENT)
Dept: ALLERGY | Facility: CLINIC | Age: 66
End: 2021-05-20
Payer: COMMERCIAL

## 2021-05-20 VITALS
TEMPERATURE: 98 F | HEART RATE: 65 BPM | SYSTOLIC BLOOD PRESSURE: 112 MMHG | HEIGHT: 61 IN | OXYGEN SATURATION: 97 % | BODY MASS INDEX: 32.66 KG/M2 | WEIGHT: 173 LBS | DIASTOLIC BLOOD PRESSURE: 76 MMHG

## 2021-05-20 DIAGNOSIS — J31.0 CHRONIC RHINITIS: ICD-10-CM

## 2021-05-20 DIAGNOSIS — D80.3 IGG3 SUBCLASS DEFICIENCY: ICD-10-CM

## 2021-05-20 DIAGNOSIS — Z90.81 H/O SPLENECTOMY: ICD-10-CM

## 2021-05-20 DIAGNOSIS — A49.9 RECURRENT BACTERIAL INFECTION: Primary | ICD-10-CM

## 2021-05-20 PROCEDURE — 99204 OFFICE O/P NEW MOD 45 MIN: CPT | Mod: S$GLB,,, | Performed by: ALLERGY & IMMUNOLOGY

## 2021-05-20 PROCEDURE — 99204 PR OFFICE/OUTPT VISIT, NEW, LEVL IV, 45-59 MIN: ICD-10-PCS | Mod: S$GLB,,, | Performed by: ALLERGY & IMMUNOLOGY

## 2021-05-20 RX ORDER — FLUTICASONE PROPIONATE 50 MCG
1 SPRAY, SUSPENSION (ML) NASAL 2 TIMES DAILY
Qty: 16 G | Refills: 3 | Status: SHIPPED | OUTPATIENT
Start: 2021-05-20 | End: 2022-09-06

## 2021-05-31 ENCOUNTER — HOSPITAL ENCOUNTER (OUTPATIENT)
Dept: RADIOLOGY | Facility: HOSPITAL | Age: 66
Discharge: HOME | End: 2021-05-31
Attending: INTERNAL MEDICINE
Payer: COMMERCIAL

## 2021-05-31 DIAGNOSIS — E11.9 CONTROLLED TYPE 2 DIABETES MELLITUS WITHOUT COMPLICATION, WITHOUT LONG-TERM CURRENT USE OF INSULIN: ICD-10-CM

## 2021-05-31 DIAGNOSIS — D69.3 IDIOPATHIC THROMBOCYTOPENIC PURPURA: ICD-10-CM

## 2021-05-31 PROCEDURE — 76700 US EXAM ABDOM COMPLETE: CPT | Mod: 26,,, | Performed by: RADIOLOGY

## 2021-05-31 PROCEDURE — 76700 US EXAM ABDOM COMPLETE: CPT | Mod: TC

## 2021-05-31 PROCEDURE — 76700 US ABDOMEN COMPLETE: ICD-10-PCS | Mod: 26,,, | Performed by: RADIOLOGY

## 2021-06-03 ENCOUNTER — OFFICE VISIT (OUTPATIENT)
Dept: HEMATOLOGY/ONCOLOGY | Facility: CLINIC | Age: 66
End: 2021-06-03
Payer: COMMERCIAL

## 2021-06-03 VITALS
SYSTOLIC BLOOD PRESSURE: 115 MMHG | DIASTOLIC BLOOD PRESSURE: 73 MMHG | HEART RATE: 64 BPM | RESPIRATION RATE: 16 BRPM | OXYGEN SATURATION: 96 % | BODY MASS INDEX: 34.09 KG/M2 | WEIGHT: 180.56 LBS | HEIGHT: 61 IN | TEMPERATURE: 98 F

## 2021-06-03 DIAGNOSIS — D69.6 THROMBOCYTOPENIA: Primary | ICD-10-CM

## 2021-06-03 DIAGNOSIS — Z90.81 H/O SPLENECTOMY: ICD-10-CM

## 2021-06-03 PROCEDURE — 99214 PR OFFICE/OUTPT VISIT, EST, LEVL IV, 30-39 MIN: ICD-10-PCS | Mod: S$GLB,,, | Performed by: INTERNAL MEDICINE

## 2021-06-03 PROCEDURE — 1126F PR PAIN SEVERITY QUANTIFIED, NO PAIN PRESENT: ICD-10-PCS | Mod: S$GLB,,, | Performed by: INTERNAL MEDICINE

## 2021-06-03 PROCEDURE — 99999 PR PBB SHADOW E&M-EST. PATIENT-LVL III: CPT | Mod: PBBFAC,,, | Performed by: INTERNAL MEDICINE

## 2021-06-03 PROCEDURE — 99999 PR PBB SHADOW E&M-EST. PATIENT-LVL III: ICD-10-PCS | Mod: PBBFAC,,, | Performed by: INTERNAL MEDICINE

## 2021-06-03 PROCEDURE — 3008F BODY MASS INDEX DOCD: CPT | Mod: CPTII,S$GLB,, | Performed by: INTERNAL MEDICINE

## 2021-06-03 PROCEDURE — 99214 OFFICE O/P EST MOD 30 MIN: CPT | Mod: S$GLB,,, | Performed by: INTERNAL MEDICINE

## 2021-06-03 PROCEDURE — 3008F PR BODY MASS INDEX (BMI) DOCUMENTED: ICD-10-PCS | Mod: CPTII,S$GLB,, | Performed by: INTERNAL MEDICINE

## 2021-06-03 PROCEDURE — 1126F AMNT PAIN NOTED NONE PRSNT: CPT | Mod: S$GLB,,, | Performed by: INTERNAL MEDICINE

## 2021-11-20 ENCOUNTER — LAB VISIT (OUTPATIENT)
Dept: LAB | Facility: HOSPITAL | Age: 66
End: 2021-11-20
Attending: SPECIALIST
Payer: COMMERCIAL

## 2021-11-20 DIAGNOSIS — Z12.5 SCREENING FOR PROSTATE CANCER: ICD-10-CM

## 2021-11-20 DIAGNOSIS — E11.9 TYPE 2 DIABETES MELLITUS WITHOUT COMPLICATION, WITHOUT LONG-TERM CURRENT USE OF INSULIN: ICD-10-CM

## 2021-11-20 DIAGNOSIS — I10 ESSENTIAL HYPERTENSION: Primary | ICD-10-CM

## 2021-11-20 DIAGNOSIS — E78.5 HYPERLIPIDEMIA, UNSPECIFIED: ICD-10-CM

## 2021-11-20 LAB
ALBUMIN SERPL BCP-MCNC: 4 G/DL (ref 3.5–5.2)
ALP SERPL-CCNC: 77 U/L (ref 55–135)
ALT SERPL W/O P-5'-P-CCNC: 26 U/L (ref 10–44)
ANION GAP SERPL CALC-SCNC: 8 MMOL/L (ref 8–16)
AST SERPL-CCNC: 21 U/L (ref 10–40)
BASOPHILS # BLD AUTO: 0.07 K/UL (ref 0–0.2)
BASOPHILS NFR BLD: 0.9 % (ref 0–1.9)
BILIRUB SERPL-MCNC: 1.2 MG/DL (ref 0.1–1)
BUN SERPL-MCNC: 17 MG/DL (ref 8–23)
CALCIUM SERPL-MCNC: 9.2 MG/DL (ref 8.7–10.5)
CHLORIDE SERPL-SCNC: 103 MMOL/L (ref 95–110)
CHOLEST SERPL-MCNC: 137 MG/DL (ref 120–199)
CHOLEST/HDLC SERPL: 3.9 {RATIO} (ref 2–5)
CO2 SERPL-SCNC: 29 MMOL/L (ref 23–29)
COMPLEXED PSA SERPL-MCNC: 2.1 NG/ML (ref 0–4)
CREAT SERPL-MCNC: 1.1 MG/DL (ref 0.5–1.4)
DIFFERENTIAL METHOD: ABNORMAL
EOSINOPHIL # BLD AUTO: 0.3 K/UL (ref 0–0.5)
EOSINOPHIL NFR BLD: 3.4 % (ref 0–8)
ERYTHROCYTE [DISTWIDTH] IN BLOOD BY AUTOMATED COUNT: 12.3 % (ref 11.5–14.5)
EST. GFR  (AFRICAN AMERICAN): >60 ML/MIN/1.73 M^2
EST. GFR  (NON AFRICAN AMERICAN): >60 ML/MIN/1.73 M^2
ESTIMATED AVG GLUCOSE: 154 MG/DL (ref 68–131)
GLUCOSE SERPL-MCNC: 176 MG/DL (ref 70–110)
HBA1C MFR BLD: 7 % (ref 4–5.6)
HCT VFR BLD AUTO: 49.1 % (ref 40–54)
HDLC SERPL-MCNC: 35 MG/DL (ref 40–75)
HDLC SERPL: 25.5 % (ref 20–50)
HGB BLD-MCNC: 17.2 G/DL (ref 14–18)
IMM GRANULOCYTES # BLD AUTO: 0.03 K/UL (ref 0–0.04)
IMM GRANULOCYTES NFR BLD AUTO: 0.4 % (ref 0–0.5)
LDLC SERPL CALC-MCNC: 66 MG/DL (ref 63–159)
LYMPHOCYTES # BLD AUTO: 2.8 K/UL (ref 1–4.8)
LYMPHOCYTES NFR BLD: 37.8 % (ref 18–48)
MAGNESIUM SERPL-MCNC: 2 MG/DL (ref 1.6–2.6)
MCH RBC QN AUTO: 34.5 PG (ref 27–31)
MCHC RBC AUTO-ENTMCNC: 35 G/DL (ref 32–36)
MCV RBC AUTO: 99 FL (ref 82–98)
MONOCYTES # BLD AUTO: 0.7 K/UL (ref 0.3–1)
MONOCYTES NFR BLD: 9.1 % (ref 4–15)
NEUTROPHILS # BLD AUTO: 3.6 K/UL (ref 1.8–7.7)
NEUTROPHILS NFR BLD: 48.4 % (ref 38–73)
NONHDLC SERPL-MCNC: 102 MG/DL
NRBC BLD-RTO: 0 /100 WBC
PLATELET # BLD AUTO: 157 K/UL (ref 150–450)
PMV BLD AUTO: 10.7 FL (ref 9.2–12.9)
POTASSIUM SERPL-SCNC: 4.3 MMOL/L (ref 3.5–5.1)
PROT SERPL-MCNC: 7.2 G/DL (ref 6–8.4)
RBC # BLD AUTO: 4.98 M/UL (ref 4.6–6.2)
SODIUM SERPL-SCNC: 140 MMOL/L (ref 136–145)
TRIGL SERPL-MCNC: 180 MG/DL (ref 30–150)
TSH SERPL DL<=0.005 MIU/L-ACNC: 0.76 UIU/ML (ref 0.4–4)
WBC # BLD AUTO: 7.39 K/UL (ref 3.9–12.7)

## 2021-11-20 PROCEDURE — 84153 ASSAY OF PSA TOTAL: CPT | Performed by: SPECIALIST

## 2021-11-20 PROCEDURE — 36415 COLL VENOUS BLD VENIPUNCTURE: CPT | Performed by: SPECIALIST

## 2021-11-20 PROCEDURE — 83036 HEMOGLOBIN GLYCOSYLATED A1C: CPT | Performed by: SPECIALIST

## 2021-11-20 PROCEDURE — 80053 COMPREHEN METABOLIC PANEL: CPT | Performed by: SPECIALIST

## 2021-11-20 PROCEDURE — 85025 COMPLETE CBC W/AUTO DIFF WBC: CPT | Performed by: SPECIALIST

## 2021-11-20 PROCEDURE — 83735 ASSAY OF MAGNESIUM: CPT | Performed by: SPECIALIST

## 2021-11-20 PROCEDURE — 84443 ASSAY THYROID STIM HORMONE: CPT | Performed by: SPECIALIST

## 2021-11-20 PROCEDURE — 80061 LIPID PANEL: CPT | Performed by: SPECIALIST

## 2021-11-24 DIAGNOSIS — I15.2 HYPERTENSION ASSOCIATED WITH DIABETES: ICD-10-CM

## 2021-11-24 DIAGNOSIS — E11.59 HYPERTENSION ASSOCIATED WITH DIABETES: ICD-10-CM

## 2021-11-24 DIAGNOSIS — E11.9 CONTROLLED TYPE 2 DIABETES MELLITUS WITHOUT COMPLICATION, WITHOUT LONG-TERM CURRENT USE OF INSULIN: ICD-10-CM

## 2021-11-24 RX ORDER — METOPROLOL SUCCINATE 25 MG/1
25 TABLET, EXTENDED RELEASE ORAL DAILY
Qty: 90 TABLET | Refills: 3 | Status: SHIPPED | OUTPATIENT
Start: 2021-11-24 | End: 2022-12-01

## 2021-11-24 RX ORDER — METFORMIN HYDROCHLORIDE 500 MG/1
TABLET, EXTENDED RELEASE ORAL
Qty: 270 TABLET | Refills: 3 | Status: SHIPPED | OUTPATIENT
Start: 2021-11-24 | End: 2022-12-01

## 2022-03-04 RX ORDER — MONTELUKAST SODIUM 10 MG/1
10 TABLET ORAL NIGHTLY
Qty: 90 TABLET | Refills: 0 | OUTPATIENT
Start: 2022-03-04

## 2022-05-13 ENCOUNTER — LAB VISIT (OUTPATIENT)
Dept: LAB | Facility: HOSPITAL | Age: 67
End: 2022-05-13
Attending: SPECIALIST
Payer: COMMERCIAL

## 2022-05-13 DIAGNOSIS — E11.9 DIABETES MELLITUS WITHOUT COMPLICATION: ICD-10-CM

## 2022-05-13 DIAGNOSIS — E78.5 HYPERLIPIDEMIA: ICD-10-CM

## 2022-05-13 DIAGNOSIS — I10 ESSENTIAL HYPERTENSION, MALIGNANT: Primary | ICD-10-CM

## 2022-05-13 LAB
ALBUMIN SERPL BCP-MCNC: 3.8 G/DL (ref 3.5–5.2)
ALP SERPL-CCNC: 83 U/L (ref 55–135)
ALT SERPL W/O P-5'-P-CCNC: 35 U/L (ref 10–44)
ANION GAP SERPL CALC-SCNC: 11 MMOL/L (ref 8–16)
AST SERPL-CCNC: 25 U/L (ref 10–40)
BASOPHILS # BLD AUTO: 0.07 K/UL (ref 0–0.2)
BASOPHILS NFR BLD: 1 % (ref 0–1.9)
BILIRUB SERPL-MCNC: 0.9 MG/DL (ref 0.1–1)
BUN SERPL-MCNC: 13 MG/DL (ref 8–23)
CALCIUM SERPL-MCNC: 9.3 MG/DL (ref 8.7–10.5)
CHLORIDE SERPL-SCNC: 103 MMOL/L (ref 95–110)
CHOLEST SERPL-MCNC: 151 MG/DL (ref 120–199)
CHOLEST/HDLC SERPL: 4.2 {RATIO} (ref 2–5)
CO2 SERPL-SCNC: 25 MMOL/L (ref 23–29)
CREAT SERPL-MCNC: 1.1 MG/DL (ref 0.5–1.4)
DIFFERENTIAL METHOD: ABNORMAL
EOSINOPHIL # BLD AUTO: 0.3 K/UL (ref 0–0.5)
EOSINOPHIL NFR BLD: 4.2 % (ref 0–8)
ERYTHROCYTE [DISTWIDTH] IN BLOOD BY AUTOMATED COUNT: 12.2 % (ref 11.5–14.5)
EST. GFR  (AFRICAN AMERICAN): >60 ML/MIN/1.73 M^2
EST. GFR  (NON AFRICAN AMERICAN): >60 ML/MIN/1.73 M^2
ESTIMATED AVG GLUCOSE: 157 MG/DL (ref 68–131)
GLUCOSE SERPL-MCNC: 207 MG/DL (ref 70–110)
HBA1C MFR BLD: 7.1 % (ref 4–5.6)
HCT VFR BLD AUTO: 48.7 % (ref 40–54)
HDLC SERPL-MCNC: 36 MG/DL (ref 40–75)
HDLC SERPL: 23.8 % (ref 20–50)
HGB BLD-MCNC: 17.3 G/DL (ref 14–18)
IMM GRANULOCYTES # BLD AUTO: 0.01 K/UL (ref 0–0.04)
IMM GRANULOCYTES NFR BLD AUTO: 0.1 % (ref 0–0.5)
LDLC SERPL CALC-MCNC: 68.6 MG/DL (ref 63–159)
LYMPHOCYTES # BLD AUTO: 3 K/UL (ref 1–4.8)
LYMPHOCYTES NFR BLD: 43.9 % (ref 18–48)
MAGNESIUM SERPL-MCNC: 2 MG/DL (ref 1.6–2.6)
MCH RBC QN AUTO: 34.4 PG (ref 27–31)
MCHC RBC AUTO-ENTMCNC: 35.5 G/DL (ref 32–36)
MCV RBC AUTO: 97 FL (ref 82–98)
MONOCYTES # BLD AUTO: 0.7 K/UL (ref 0.3–1)
MONOCYTES NFR BLD: 9.4 % (ref 4–15)
NEUTROPHILS # BLD AUTO: 2.8 K/UL (ref 1.8–7.7)
NEUTROPHILS NFR BLD: 41.4 % (ref 38–73)
NONHDLC SERPL-MCNC: 115 MG/DL
NRBC BLD-RTO: 0 /100 WBC
PLATELET # BLD AUTO: 162 K/UL (ref 150–450)
PMV BLD AUTO: 10.3 FL (ref 9.2–12.9)
POTASSIUM SERPL-SCNC: 5 MMOL/L (ref 3.5–5.1)
PROT SERPL-MCNC: 7.1 G/DL (ref 6–8.4)
RBC # BLD AUTO: 5.03 M/UL (ref 4.6–6.2)
SODIUM SERPL-SCNC: 139 MMOL/L (ref 136–145)
TRIGL SERPL-MCNC: 232 MG/DL (ref 30–150)
TSH SERPL DL<=0.005 MIU/L-ACNC: 0.87 UIU/ML (ref 0.4–4)
WBC # BLD AUTO: 6.88 K/UL (ref 3.9–12.7)

## 2022-05-13 PROCEDURE — 83036 HEMOGLOBIN GLYCOSYLATED A1C: CPT | Performed by: SPECIALIST

## 2022-05-13 PROCEDURE — 83735 ASSAY OF MAGNESIUM: CPT | Performed by: SPECIALIST

## 2022-05-13 PROCEDURE — 36415 COLL VENOUS BLD VENIPUNCTURE: CPT | Performed by: SPECIALIST

## 2022-05-13 PROCEDURE — 80061 LIPID PANEL: CPT | Performed by: SPECIALIST

## 2022-05-13 PROCEDURE — 85025 COMPLETE CBC W/AUTO DIFF WBC: CPT | Performed by: SPECIALIST

## 2022-05-13 PROCEDURE — 80053 COMPREHEN METABOLIC PANEL: CPT | Performed by: SPECIALIST

## 2022-05-13 PROCEDURE — 84443 ASSAY THYROID STIM HORMONE: CPT | Performed by: SPECIALIST

## 2022-09-06 ENCOUNTER — HOSPITAL ENCOUNTER (INPATIENT)
Facility: HOSPITAL | Age: 67
LOS: 1 days | Discharge: HOME | DRG: 438 | End: 2022-09-09
Attending: EMERGENCY MEDICINE | Admitting: INTERNAL MEDICINE
Payer: COMMERCIAL

## 2022-09-06 DIAGNOSIS — I15.2 HYPERTENSION ASSOCIATED WITH DIABETES: ICD-10-CM

## 2022-09-06 DIAGNOSIS — K85.00 IDIOPATHIC ACUTE PANCREATITIS WITHOUT INFECTION OR NECROSIS: Primary | ICD-10-CM

## 2022-09-06 DIAGNOSIS — E11.59 HYPERTENSION ASSOCIATED WITH DIABETES: ICD-10-CM

## 2022-09-06 DIAGNOSIS — R07.9 CHEST PAIN: ICD-10-CM

## 2022-09-06 DIAGNOSIS — K85.90 PANCREATITIS: ICD-10-CM

## 2022-09-06 DIAGNOSIS — R10.9 ABDOMINAL PAIN: ICD-10-CM

## 2022-09-06 PROBLEM — I25.10 CAD (CORONARY ARTERY DISEASE): Status: ACTIVE | Noted: 2022-09-06

## 2022-09-06 PROBLEM — I71.40 AAA (ABDOMINAL AORTIC ANEURYSM): Status: ACTIVE | Noted: 2022-09-06

## 2022-09-06 PROBLEM — I74.10 AORTIC MURAL THROMBUS: Status: ACTIVE | Noted: 2022-09-06

## 2022-09-06 PROBLEM — I71.02 DISSECTION OF ABDOMINAL AORTA: Status: ACTIVE | Noted: 2022-09-06

## 2022-09-06 LAB
ALBUMIN SERPL BCP-MCNC: 4.3 G/DL (ref 3.5–5.2)
ALP SERPL-CCNC: 61 U/L (ref 55–135)
ALT SERPL W/O P-5'-P-CCNC: 28 U/L (ref 10–44)
ANION GAP SERPL CALC-SCNC: 9 MMOL/L (ref 8–16)
APTT PPP: 27.5 SEC (ref 23.3–35.1)
AST SERPL-CCNC: 35 U/L (ref 10–40)
BACTERIA #/AREA URNS HPF: NEGATIVE /HPF
BASOPHILS # BLD AUTO: 0.09 K/UL (ref 0–0.2)
BASOPHILS NFR BLD: 1.1 % (ref 0–1.9)
BILIRUB SERPL-MCNC: 1.2 MG/DL (ref 0.1–1)
BILIRUB UR QL STRIP: NEGATIVE
BUN SERPL-MCNC: 14 MG/DL (ref 8–23)
CALCIUM SERPL-MCNC: 9.5 MG/DL (ref 8.7–10.5)
CAOX CRY URNS QL MICRO: ABNORMAL
CHLORIDE SERPL-SCNC: 102 MMOL/L (ref 95–110)
CK SERPL-CCNC: 67 U/L (ref 20–200)
CLARITY UR: CLEAR
CO2 SERPL-SCNC: 26 MMOL/L (ref 23–29)
COLOR UR: YELLOW
CREAT SERPL-MCNC: 1 MG/DL (ref 0.5–1.4)
DIFFERENTIAL METHOD: ABNORMAL
EOSINOPHIL # BLD AUTO: 0.2 K/UL (ref 0–0.5)
EOSINOPHIL NFR BLD: 2.8 % (ref 0–8)
ERYTHROCYTE [DISTWIDTH] IN BLOOD BY AUTOMATED COUNT: 12.1 % (ref 11.5–14.5)
EST. GFR  (NO RACE VARIABLE): >60 ML/MIN/1.73 M^2
GLUCOSE SERPL-MCNC: 116 MG/DL (ref 70–110)
GLUCOSE SERPL-MCNC: 139 MG/DL (ref 70–110)
GLUCOSE UR QL STRIP: ABNORMAL
HCT VFR BLD AUTO: 51.3 % (ref 40–54)
HGB BLD-MCNC: 17.9 G/DL (ref 14–18)
HGB UR QL STRIP: NEGATIVE
HYALINE CASTS #/AREA URNS LPF: 8 /LPF
IMM GRANULOCYTES # BLD AUTO: 0.03 K/UL (ref 0–0.04)
IMM GRANULOCYTES NFR BLD AUTO: 0.4 % (ref 0–0.5)
INR PPP: 1.1
KETONES UR QL STRIP: ABNORMAL
LEUKOCYTE ESTERASE UR QL STRIP: ABNORMAL
LIPASE SERPL-CCNC: 189 U/L (ref 4–60)
LYMPHOCYTES # BLD AUTO: 3.3 K/UL (ref 1–4.8)
LYMPHOCYTES NFR BLD: 39.8 % (ref 18–48)
MAGNESIUM SERPL-MCNC: 1.8 MG/DL (ref 1.6–2.6)
MCH RBC QN AUTO: 34.2 PG (ref 27–31)
MCHC RBC AUTO-ENTMCNC: 34.9 G/DL (ref 32–36)
MCV RBC AUTO: 98 FL (ref 82–98)
MICROSCOPIC COMMENT: ABNORMAL
MONOCYTES # BLD AUTO: 0.8 K/UL (ref 0.3–1)
MONOCYTES NFR BLD: 9.9 % (ref 4–15)
NEUTROPHILS # BLD AUTO: 3.8 K/UL (ref 1.8–7.7)
NEUTROPHILS NFR BLD: 46 % (ref 38–73)
NITRITE UR QL STRIP: NEGATIVE
NRBC BLD-RTO: 0 /100 WBC
PH UR STRIP: 5 [PH] (ref 5–8)
PLATELET # BLD AUTO: 240 K/UL (ref 150–450)
PMV BLD AUTO: 10.9 FL (ref 9.2–12.9)
POTASSIUM SERPL-SCNC: 5 MMOL/L (ref 3.5–5.1)
PROT SERPL-MCNC: 7.4 G/DL (ref 6–8.4)
PROT UR QL STRIP: ABNORMAL
PROTHROMBIN TIME: 13.3 SEC (ref 11.4–13.7)
RBC # BLD AUTO: 5.23 M/UL (ref 4.6–6.2)
RBC #/AREA URNS HPF: 1 /HPF (ref 0–4)
SARS-COV-2 RDRP RESP QL NAA+PROBE: NEGATIVE
SODIUM SERPL-SCNC: 137 MMOL/L (ref 136–145)
SP GR UR STRIP: 1.02 (ref 1–1.03)
SQUAMOUS #/AREA URNS HPF: 2 /HPF
TROPONIN I SERPL DL<=0.01 NG/ML-MCNC: <0.03 NG/ML
TROPONIN I SERPL DL<=0.01 NG/ML-MCNC: <0.03 NG/ML
TSH SERPL DL<=0.005 MIU/L-ACNC: 1.02 UIU/ML (ref 0.34–5.6)
URN SPEC COLLECT METH UR: ABNORMAL
UROBILINOGEN UR STRIP-ACNC: NEGATIVE EU/DL
WBC # BLD AUTO: 8.3 K/UL (ref 3.9–12.7)
WBC #/AREA URNS HPF: 3 /HPF (ref 0–5)

## 2022-09-06 PROCEDURE — G0378 HOSPITAL OBSERVATION PER HR: HCPCS

## 2022-09-06 PROCEDURE — 87040 BLOOD CULTURE FOR BACTERIA: CPT | Performed by: FAMILY MEDICINE

## 2022-09-06 PROCEDURE — 82077 ASSAY SPEC XCP UR&BREATH IA: CPT | Performed by: FAMILY MEDICINE

## 2022-09-06 PROCEDURE — 84484 ASSAY OF TROPONIN QUANT: CPT | Performed by: EMERGENCY MEDICINE

## 2022-09-06 PROCEDURE — 85610 PROTHROMBIN TIME: CPT | Performed by: EMERGENCY MEDICINE

## 2022-09-06 PROCEDURE — 36415 COLL VENOUS BLD VENIPUNCTURE: CPT | Performed by: FAMILY MEDICINE

## 2022-09-06 PROCEDURE — 82550 ASSAY OF CK (CPK): CPT | Performed by: EMERGENCY MEDICINE

## 2022-09-06 PROCEDURE — 96365 THER/PROPH/DIAG IV INF INIT: CPT

## 2022-09-06 PROCEDURE — 96361 HYDRATE IV INFUSION ADD-ON: CPT

## 2022-09-06 PROCEDURE — 80307 DRUG TEST PRSMV CHEM ANLYZR: CPT | Performed by: FAMILY MEDICINE

## 2022-09-06 PROCEDURE — 85025 COMPLETE CBC W/AUTO DIFF WBC: CPT | Performed by: EMERGENCY MEDICINE

## 2022-09-06 PROCEDURE — 84443 ASSAY THYROID STIM HORMONE: CPT | Performed by: EMERGENCY MEDICINE

## 2022-09-06 PROCEDURE — 93010 ELECTROCARDIOGRAM REPORT: CPT | Mod: ,,, | Performed by: INTERNAL MEDICINE

## 2022-09-06 PROCEDURE — 80053 COMPREHEN METABOLIC PANEL: CPT | Performed by: EMERGENCY MEDICINE

## 2022-09-06 PROCEDURE — 25000003 PHARM REV CODE 250: Performed by: FAMILY MEDICINE

## 2022-09-06 PROCEDURE — 83735 ASSAY OF MAGNESIUM: CPT | Performed by: EMERGENCY MEDICINE

## 2022-09-06 PROCEDURE — 25500020 PHARM REV CODE 255: Performed by: EMERGENCY MEDICINE

## 2022-09-06 PROCEDURE — 85730 THROMBOPLASTIN TIME PARTIAL: CPT | Performed by: EMERGENCY MEDICINE

## 2022-09-06 PROCEDURE — 63600175 PHARM REV CODE 636 W HCPCS: Performed by: FAMILY MEDICINE

## 2022-09-06 PROCEDURE — 99285 EMERGENCY DEPT VISIT HI MDM: CPT | Mod: 25

## 2022-09-06 PROCEDURE — 30000890 LABCORP MISCELLANEOUS TEST: Performed by: FAMILY MEDICINE

## 2022-09-06 PROCEDURE — 93005 ELECTROCARDIOGRAM TRACING: CPT | Performed by: INTERNAL MEDICINE

## 2022-09-06 PROCEDURE — 96366 THER/PROPH/DIAG IV INF ADDON: CPT

## 2022-09-06 PROCEDURE — 96375 TX/PRO/DX INJ NEW DRUG ADDON: CPT

## 2022-09-06 PROCEDURE — 83036 HEMOGLOBIN GLYCOSYLATED A1C: CPT | Performed by: FAMILY MEDICINE

## 2022-09-06 PROCEDURE — 80061 LIPID PANEL: CPT | Performed by: FAMILY MEDICINE

## 2022-09-06 PROCEDURE — 93010 EKG 12-LEAD: ICD-10-PCS | Mod: ,,, | Performed by: INTERNAL MEDICINE

## 2022-09-06 PROCEDURE — 25000003 PHARM REV CODE 250: Performed by: EMERGENCY MEDICINE

## 2022-09-06 PROCEDURE — U0002 COVID-19 LAB TEST NON-CDC: HCPCS | Performed by: FAMILY MEDICINE

## 2022-09-06 PROCEDURE — 83690 ASSAY OF LIPASE: CPT | Performed by: EMERGENCY MEDICINE

## 2022-09-06 PROCEDURE — 36415 COLL VENOUS BLD VENIPUNCTURE: CPT | Performed by: EMERGENCY MEDICINE

## 2022-09-06 PROCEDURE — 81001 URINALYSIS AUTO W/SCOPE: CPT | Performed by: EMERGENCY MEDICINE

## 2022-09-06 PROCEDURE — 84484 ASSAY OF TROPONIN QUANT: CPT | Mod: 91 | Performed by: FAMILY MEDICINE

## 2022-09-06 RX ORDER — TALC
6 POWDER (GRAM) TOPICAL NIGHTLY PRN
Status: DISCONTINUED | OUTPATIENT
Start: 2022-09-06 | End: 2022-09-09 | Stop reason: HOSPADM

## 2022-09-06 RX ORDER — MAGNESIUM SULFATE HEPTAHYDRATE 40 MG/ML
2 INJECTION, SOLUTION INTRAVENOUS
Status: DISCONTINUED | OUTPATIENT
Start: 2022-09-06 | End: 2022-09-09 | Stop reason: HOSPADM

## 2022-09-06 RX ORDER — ONDANSETRON 2 MG/ML
4 INJECTION INTRAMUSCULAR; INTRAVENOUS EVERY 6 HOURS PRN
Status: DISCONTINUED | OUTPATIENT
Start: 2022-09-06 | End: 2022-09-09 | Stop reason: HOSPADM

## 2022-09-06 RX ORDER — SODIUM CHLORIDE 9 MG/ML
INJECTION, SOLUTION INTRAVENOUS CONTINUOUS
Status: DISCONTINUED | OUTPATIENT
Start: 2022-09-06 | End: 2022-09-08

## 2022-09-06 RX ORDER — POTASSIUM CHLORIDE 7.45 MG/ML
20 INJECTION INTRAVENOUS
Status: DISCONTINUED | OUTPATIENT
Start: 2022-09-06 | End: 2022-09-09 | Stop reason: HOSPADM

## 2022-09-06 RX ORDER — LANOLIN ALCOHOL/MO/W.PET/CERES
800 CREAM (GRAM) TOPICAL
Status: DISCONTINUED | OUTPATIENT
Start: 2022-09-06 | End: 2022-09-09 | Stop reason: HOSPADM

## 2022-09-06 RX ORDER — POTASSIUM CHLORIDE 20 MEQ/1
20 TABLET, EXTENDED RELEASE ORAL
Status: DISCONTINUED | OUTPATIENT
Start: 2022-09-06 | End: 2022-09-09 | Stop reason: HOSPADM

## 2022-09-06 RX ORDER — IPRATROPIUM BROMIDE AND ALBUTEROL SULFATE 2.5; .5 MG/3ML; MG/3ML
3 SOLUTION RESPIRATORY (INHALATION) EVERY 4 HOURS PRN
Status: DISCONTINUED | OUTPATIENT
Start: 2022-09-06 | End: 2022-09-09 | Stop reason: HOSPADM

## 2022-09-06 RX ORDER — HYDRALAZINE HYDROCHLORIDE 20 MG/ML
10 INJECTION INTRAMUSCULAR; INTRAVENOUS EVERY 4 HOURS PRN
Status: DISCONTINUED | OUTPATIENT
Start: 2022-09-06 | End: 2022-09-06

## 2022-09-06 RX ORDER — CIPROFLOXACIN 500 MG/1
500 TABLET ORAL 2 TIMES DAILY
Status: ON HOLD | COMMUNITY
Start: 2022-08-16 | End: 2022-09-09 | Stop reason: HOSPADM

## 2022-09-06 RX ORDER — MORPHINE SULFATE 4 MG/ML
4 INJECTION, SOLUTION INTRAMUSCULAR; INTRAVENOUS EVERY 4 HOURS PRN
Status: DISCONTINUED | OUTPATIENT
Start: 2022-09-06 | End: 2022-09-09 | Stop reason: HOSPADM

## 2022-09-06 RX ORDER — MAGNESIUM SULFATE 1 G/100ML
1 INJECTION INTRAVENOUS
Status: DISCONTINUED | OUTPATIENT
Start: 2022-09-06 | End: 2022-09-09 | Stop reason: HOSPADM

## 2022-09-06 RX ORDER — POTASSIUM CHLORIDE 20 MEQ/1
40 TABLET, EXTENDED RELEASE ORAL
Status: DISCONTINUED | OUTPATIENT
Start: 2022-09-06 | End: 2022-09-09 | Stop reason: HOSPADM

## 2022-09-06 RX ORDER — IBUPROFEN 200 MG
16 TABLET ORAL
Status: DISCONTINUED | OUTPATIENT
Start: 2022-09-06 | End: 2022-09-09 | Stop reason: HOSPADM

## 2022-09-06 RX ORDER — POTASSIUM CHLORIDE 7.45 MG/ML
40 INJECTION INTRAVENOUS
Status: DISCONTINUED | OUTPATIENT
Start: 2022-09-06 | End: 2022-09-09 | Stop reason: HOSPADM

## 2022-09-06 RX ORDER — HYDRALAZINE HYDROCHLORIDE 20 MG/ML
5 INJECTION INTRAMUSCULAR; INTRAVENOUS EVERY 4 HOURS PRN
Status: DISCONTINUED | OUTPATIENT
Start: 2022-09-06 | End: 2022-09-06

## 2022-09-06 RX ORDER — HYDROCODONE BITARTRATE AND ACETAMINOPHEN 5; 325 MG/1; MG/1
1 TABLET ORAL EVERY 4 HOURS PRN
Status: DISCONTINUED | OUTPATIENT
Start: 2022-09-06 | End: 2022-09-09 | Stop reason: HOSPADM

## 2022-09-06 RX ORDER — NICARDIPINE HYDROCHLORIDE 0.2 MG/ML
0-15 INJECTION INTRAVENOUS CONTINUOUS
Status: DISCONTINUED | OUTPATIENT
Start: 2022-09-07 | End: 2022-09-07

## 2022-09-06 RX ORDER — ASPIRIN 81 MG/1
81 TABLET ORAL DAILY
Status: DISCONTINUED | OUTPATIENT
Start: 2022-09-07 | End: 2022-09-06

## 2022-09-06 RX ORDER — AMOXICILLIN 250 MG
1 CAPSULE ORAL 2 TIMES DAILY PRN
Status: DISCONTINUED | OUTPATIENT
Start: 2022-09-06 | End: 2022-09-09 | Stop reason: HOSPADM

## 2022-09-06 RX ORDER — ACETAMINOPHEN 325 MG/1
650 TABLET ORAL EVERY 8 HOURS PRN
Status: DISCONTINUED | OUTPATIENT
Start: 2022-09-06 | End: 2022-09-09 | Stop reason: HOSPADM

## 2022-09-06 RX ORDER — NALOXONE HCL 0.4 MG/ML
0.02 VIAL (ML) INJECTION
Status: DISCONTINUED | OUTPATIENT
Start: 2022-09-06 | End: 2022-09-09 | Stop reason: HOSPADM

## 2022-09-06 RX ORDER — METRONIDAZOLE 500 MG/1
500 TABLET ORAL 3 TIMES DAILY
Status: ON HOLD | COMMUNITY
Start: 2022-08-16 | End: 2022-09-09 | Stop reason: HOSPADM

## 2022-09-06 RX ORDER — LABETALOL HYDROCHLORIDE 5 MG/ML
10 INJECTION, SOLUTION INTRAVENOUS EVERY 4 HOURS PRN
Status: DISCONTINUED | OUTPATIENT
Start: 2022-09-06 | End: 2022-09-09 | Stop reason: HOSPADM

## 2022-09-06 RX ORDER — IBUPROFEN 200 MG
24 TABLET ORAL
Status: DISCONTINUED | OUTPATIENT
Start: 2022-09-06 | End: 2022-09-09 | Stop reason: HOSPADM

## 2022-09-06 RX ORDER — POLYETHYLENE GLYCOL 3350 17 G/17G
17 POWDER, FOR SOLUTION ORAL 2 TIMES DAILY PRN
Status: DISCONTINUED | OUTPATIENT
Start: 2022-09-06 | End: 2022-09-09 | Stop reason: HOSPADM

## 2022-09-06 RX ORDER — LISINOPRIL 10 MG/1
10 TABLET ORAL DAILY
Status: DISCONTINUED | OUTPATIENT
Start: 2022-09-07 | End: 2022-09-09 | Stop reason: HOSPADM

## 2022-09-06 RX ORDER — INSULIN ASPART 100 [IU]/ML
1-12 INJECTION, SOLUTION INTRAVENOUS; SUBCUTANEOUS
Status: DISCONTINUED | OUTPATIENT
Start: 2022-09-06 | End: 2022-09-09 | Stop reason: HOSPADM

## 2022-09-06 RX ORDER — MONTELUKAST SODIUM 10 MG/1
10 TABLET ORAL NIGHTLY
Status: DISCONTINUED | OUTPATIENT
Start: 2022-09-06 | End: 2022-09-09 | Stop reason: HOSPADM

## 2022-09-06 RX ORDER — METOPROLOL SUCCINATE 25 MG/1
25 TABLET, EXTENDED RELEASE ORAL DAILY
Status: DISCONTINUED | OUTPATIENT
Start: 2022-09-07 | End: 2022-09-09 | Stop reason: HOSPADM

## 2022-09-06 RX ORDER — SODIUM CHLORIDE 0.9 % (FLUSH) 0.9 %
10 SYRINGE (ML) INJECTION
Status: DISCONTINUED | OUTPATIENT
Start: 2022-09-06 | End: 2022-09-09 | Stop reason: HOSPADM

## 2022-09-06 RX ORDER — GLUCAGON 1 MG
1 KIT INJECTION
Status: DISCONTINUED | OUTPATIENT
Start: 2022-09-06 | End: 2022-09-09 | Stop reason: HOSPADM

## 2022-09-06 RX ORDER — MAGNESIUM SULFATE HEPTAHYDRATE 40 MG/ML
4 INJECTION, SOLUTION INTRAVENOUS
Status: DISCONTINUED | OUTPATIENT
Start: 2022-09-06 | End: 2022-09-09 | Stop reason: HOSPADM

## 2022-09-06 RX ORDER — PANTOPRAZOLE SODIUM 40 MG/10ML
40 INJECTION, POWDER, LYOPHILIZED, FOR SOLUTION INTRAVENOUS DAILY
Status: DISCONTINUED | OUTPATIENT
Start: 2022-09-07 | End: 2022-09-09

## 2022-09-06 RX ORDER — SIMETHICONE 80 MG
1 TABLET,CHEWABLE ORAL 4 TIMES DAILY PRN
Status: DISCONTINUED | OUTPATIENT
Start: 2022-09-06 | End: 2022-09-09 | Stop reason: HOSPADM

## 2022-09-06 RX ORDER — ATORVASTATIN CALCIUM 40 MG/1
40 TABLET, FILM COATED ORAL NIGHTLY
Status: DISCONTINUED | OUTPATIENT
Start: 2022-09-06 | End: 2022-09-09 | Stop reason: HOSPADM

## 2022-09-06 RX ADMIN — SODIUM CHLORIDE: 0.9 INJECTION, SOLUTION INTRAVENOUS at 07:09

## 2022-09-06 RX ADMIN — SODIUM CHLORIDE 1000 ML: 0.9 INJECTION, SOLUTION INTRAVENOUS at 01:09

## 2022-09-06 RX ADMIN — LABETALOL HYDROCHLORIDE 10 MG: 5 INJECTION, SOLUTION INTRAVENOUS at 11:09

## 2022-09-06 RX ADMIN — PIPERACILLIN SODIUM AND TAZOBACTAM SODIUM 3.38 G: 3; .375 INJECTION, POWDER, LYOPHILIZED, FOR SOLUTION INTRAVENOUS at 07:09

## 2022-09-06 RX ADMIN — MONTELUKAST 10 MG: 10 TABLET, FILM COATED ORAL at 08:09

## 2022-09-06 RX ADMIN — MORPHINE SULFATE 4 MG: 4 INJECTION, SOLUTION INTRAMUSCULAR; INTRAVENOUS at 09:09

## 2022-09-06 RX ADMIN — IOHEXOL 100 ML: 350 INJECTION, SOLUTION INTRAVENOUS at 01:09

## 2022-09-06 RX ADMIN — HYDROCODONE BITARTRATE AND ACETAMINOPHEN 1 TABLET: 5; 325 TABLET ORAL at 07:09

## 2022-09-06 NOTE — ED PROVIDER NOTES
Encounter Date: 9/6/2022       History     Chief Complaint   Patient presents with    Abdominal Pain     LLQ x3 weeks. Pt was dxd with diverticulitis Monday. Pt also has kidney stones w/ L flank pain. Currently on ABX.      66-year-old male presents complaining of worsening abdominal pain over the past 3 weeks.  The patient has a history of diabetes, hypertension hyperlipidemia as well as ITP status post splenectomy.  He has had acute diverticulitis in the past.  He has an ongoing open prescription for Flagyl and Cipro when he feels that he is having a diverticulitis flare.  He felt lower abdominal pain starting several weeks ago.  He started Cipro and Flagyl at that time.  The pain did not improve and did worsen.  He was evaluated by his primary care physician and was recommended that he continue for a longer course of antibiotics.  He presents now as the pain has not improved and is worsening.  He has had some epigastric discomfort intermittently that he describes as belching and indigestion but is mostly having crampy left lower abdominal and left flank pain.  He denies dysuria frequency urgency or any penile or testicular pain but has also had kidney stones in the past.  As the symptoms had not improved and were worsening this prompted ED presentation.  He denies fever, chest pain or shortness of breath.  He states that his appetite is very decreased from the discomfort.      Review of patient's allergies indicates:   Allergen Reactions    Dapagliflozin Other (See Comments)     myalgia    Niacin      Past Medical History:   Diagnosis Date    AK (actinic keratosis)     Anxiety     Benign hypertension     Diabetes mellitus, type 2     Diverticulitis     Fatty liver     GERD (gastroesophageal reflux disease)     Hyperlipidemia     ITP (idiopathic thrombocytopenic purpura)     Occlusive coronary artery disease      Past Surgical History:   Procedure Laterality Date    COLONOSCOPY  04/11/2016    Dr. Abbott,  multiple polyps, recheck 6 month    CORONARY ARTERY BYPASS GRAFT      EYE SURGERY      SPLENECTOMY, TOTAL       Family History   Problem Relation Age of Onset    Diabetes Mother     Hypertension Mother     Skin cancer Mother     Diabetes Brother     Heart disease Brother     Hyperlipidemia Father     Hypertension Father     Diabetes Maternal Aunt     Heart disease Maternal Uncle     Cancer Paternal Aunt         thyroid    Heart disease Paternal Uncle     Cancer Paternal Uncle         lung    Diabetes Maternal Grandmother     Heart disease Maternal Grandfather     Heart disease Paternal Grandfather     No Known Problems Son     Melanoma Neg Hx     Psoriasis Neg Hx     Lupus Neg Hx     Eczema Neg Hx      Social History     Tobacco Use    Smoking status: Former     Types: Cigarettes     Quit date: 10/9/2010     Years since quittin.9    Smokeless tobacco: Never   Substance Use Topics    Alcohol use: Yes    Drug use: No     Review of Systems   Constitutional:  Positive for appetite change and fatigue. Negative for activity change, chills, diaphoresis, fever and unexpected weight change.   HENT: Negative.  Negative for congestion, dental problem, ear pain, nosebleeds, postnasal drip, rhinorrhea, sinus pressure, sinus pain, sore throat, tinnitus, trouble swallowing and voice change.    Eyes: Negative.  Negative for pain and visual disturbance.   Respiratory: Negative.  Negative for cough, chest tightness, shortness of breath and wheezing.    Cardiovascular: Negative.  Negative for chest pain, palpitations and leg swelling.   Gastrointestinal:  Positive for abdominal pain and nausea. Negative for anal bleeding, blood in stool, constipation, diarrhea, rectal pain and vomiting.        No abdominal distension but this had bloating/has been flatulence/denies constipation or diarrhea.  Denies gastrointestinal bleeding.   Endocrine: Negative.    Genitourinary: Negative.  Negative for decreased urine volume, difficulty  urinating, dysuria, flank pain, frequency, penile pain, scrotal swelling, testicular pain and urgency.   Musculoskeletal: Negative.  Negative for arthralgias, back pain, gait problem, joint swelling, myalgias, neck pain and neck stiffness.   Skin: Negative.  Negative for color change, pallor and rash.   Neurological: Negative.  Negative for dizziness, seizures, syncope, facial asymmetry, speech difficulty, weakness, light-headedness, numbness and headaches.   Hematological: Negative.  Does not bruise/bleed easily.   Psychiatric/Behavioral: Negative.  Negative for confusion.    All other systems reviewed and are negative.    Physical Exam     Initial Vitals [09/06/22 1214]   BP Pulse Resp Temp SpO2   107/77 66 18 97.7 °F (36.5 °C) 98 %      MAP       --         Physical Exam    Nursing note and vitals reviewed.  Constitutional: He is cooperative. He does not appear ill. No distress.   HENT:   Head: Normocephalic and atraumatic.   Nose: Nose normal. No mucosal edema or rhinorrhea.   Mouth/Throat: Uvula is midline, oropharynx is clear and moist and mucous membranes are normal. No oral lesions. No trismus in the jaw. No uvula swelling. No oropharyngeal exudate, posterior oropharyngeal edema, posterior oropharyngeal erythema or tonsillar abscesses.   Eyes: Conjunctivae, EOM and lids are normal. Pupils are equal, round, and reactive to light. Right eye exhibits no discharge. Left eye exhibits no discharge. No scleral icterus.   Neck: Trachea normal and phonation normal. Neck supple. No thyromegaly present. No stridor present. No tracheal deviation present. No JVD present.   Normal range of motion.   Full passive range of motion without pain.     Cardiovascular:  Normal rate, regular rhythm, normal heart sounds, intact distal pulses and normal pulses.     Exam reveals no distant heart sounds and no decreased pulses.       No murmur heard.  Pulmonary/Chest: Effort normal and breath sounds normal. No stridor. No respiratory  distress. He has no decreased breath sounds. He has no wheezes. He has no rhonchi. He has no rales.   Abdominal: Abdomen is soft and protuberant. Bowel sounds are normal. He exhibits no distension, no abdominal bruit, no pulsatile midline mass and no mass. There is abdominal tenderness in the epigastric area, suprapubic area and left lower quadrant. No hernia.   No right CVA tenderness.  No left CVA tenderness. There is no rebound and no guarding. negative psoas sign and negative Rovsing's sign  Genitourinary:    Testes and penis normal.     Musculoskeletal:         General: No tenderness or edema. Normal range of motion.      Right hand: Normal. No tenderness or bony tenderness. Normal range of motion. Normal strength. Normal sensation. Normal capillary refill. Normal pulse.      Left hand: Normal. No tenderness or bony tenderness. Normal range of motion. Normal strength. Normal sensation. Normal capillary refill. Normal pulse.      Cervical back: Normal, full passive range of motion without pain, normal range of motion and neck supple. No swelling, edema, erythema, tenderness or bony tenderness. No spinous process tenderness or muscular tenderness. Normal range of motion and normal range of motion. Normal.      Thoracic back: Normal. No swelling, tenderness or bony tenderness. Normal range of motion.      Lumbar back: Normal. No swelling, edema, tenderness or bony tenderness. Normal range of motion.      Right foot: Normal. Normal range of motion and normal capillary refill. No swelling, tenderness or bony tenderness. Normal pulse.      Left foot: Normal. Normal range of motion and normal capillary refill. No swelling, tenderness or bony tenderness. Normal pulse.      Comments: Pulses 2+ throughout, no extremity abnormalities     Lymphadenopathy:     He has no cervical adenopathy.   Neurological: He is alert and oriented to person, place, and time. He has normal strength. No cranial nerve deficit or sensory  deficit. Coordination and gait normal. GCS score is 15. GCS eye subscore is 4. GCS verbal subscore is 5. GCS motor subscore is 6.   Skin: Skin is warm and dry. Capillary refill takes less than 2 seconds. No ecchymosis, no petechiae and no rash noted. No cyanosis or erythema. No pallor.   Psychiatric: He has a normal mood and affect. His speech is normal and behavior is normal. Judgment and thought content normal. Cognition and memory are normal.       ED Course   Procedures  Labs Reviewed   CBC W/ AUTO DIFFERENTIAL - Abnormal; Notable for the following components:       Result Value    MCH 34.2 (*)     All other components within normal limits   COMPREHENSIVE METABOLIC PANEL - Abnormal; Notable for the following components:    Glucose 139 (*)     Total Bilirubin 1.2 (*)     All other components within normal limits   LIPASE - Abnormal; Notable for the following components:    Lipase 189 (*)     All other components within normal limits   URINALYSIS, REFLEX TO URINE CULTURE - Abnormal; Notable for the following components:    Protein, UA Trace (*)     Glucose, UA Trace (*)     Ketones, UA 1+ (*)     Leukocytes, UA Trace (*)     All other components within normal limits    Narrative:     Specimen Source->Urine   URINALYSIS MICROSCOPIC - Abnormal; Notable for the following components:    Hyaline Casts, UA 8 (*)     All other components within normal limits    Narrative:     Specimen Source->Urine   CK   APTT   MAGNESIUM   PROTIME-INR   TROPONIN I   TSH   MAGNESIUM   TSH   TROPONIN I   SARS-COV-2 RNA AMPLIFICATION, QUAL   ALCOHOL,MEDICAL (ETHANOL)   DRUG SCREEN PANEL, URINE EMERGENCY   LIPID PANEL   ALCOHOL,MEDICAL (ETHANOL)   POCT GLUCOSE MONITORING CONTINUOUS        ECG Results              EKG 12-lead (In process)  Result time 09/06/22 13:10:53      In process by Interface, Lab In [facility] (09/06/22 13:10:53)                   Narrative:    Test Reason : R10.9,    Vent. Rate : 059 BPM     Atrial Rate : 059 BPM     P-R  Int : 122 ms          QRS Dur : 090 ms      QT Int : 444 ms       P-R-T Axes : 009 111 078 degrees     QTc Int : 439 ms    Sinus bradycardia with frequent Premature ventricular complexes in a  pattern of bigeminy  Left posterior fascicular block  Cannot rule out Anterior infarct ,age undetermined  Abnormal ECG  When compared with ECG of 22-FEB-2010 15:52,  Premature ventricular complexes are now Present  Left posterior fascicular block is now Present  T wave inversion now evident in Anterior leads    Referred By: [email or web address]   SELF           Confirmed By:                                   Imaging Results              US Abdomen Limited (Final result)  Result time 09/06/22 17:35:50      Final result by Tej Tirado MD (09/06/22 17:35:50)                   Narrative:    Abdominal ultrasound Limited    CLINICAL DATA: Right upper quadrant pain    FINDINGS: Sonographic assessment targeted to the right upper quadrant is compared to a same date CT examination.    The pancreas is mostly obscured by bowel gas.    Visualized portions of aorta and inferior vena cava are unremarkable. The liver has a normal appearance. The gallbladder and biliary tree are unremarkable. Common bile duct measures 3 mm.    Hepatopedal flow is noted within the portal vein. 8-9 mm nonobstructing lower pole right renal calculus is noted, with small lower pole cyst. There is no right upper quadrant free fluid.    IMPRESSION:  1. Normal appearance of the gallbladder and biliary tree.  2. Nonobstructing right renal calculus and small right renal cyst.  3. Poor visualization of the pancreas.    Electronically signed by:  Tej Tirado MD  9/6/2022 5:35 PM CDT Workstation: [id number]                                     X-Ray Chest AP Portable (Final result)  Result time 09/06/22 13:59:12   Procedure changed from X-Ray Chest 1 View     Final result by Tej Tirado MD (09/06/22 13:59:12)                   Narrative:    Chest single  view    CLINICAL DATA: Abdominal pain    FINDINGS: The heart is borderline enlarged. There has been previous median sternotomy. The lungs are clear.    IMPRESSION:  1. No acute radiographic abnormalities.  2. Borderline cardiomegaly. Post sternotomy changes.    Electronically signed by:  Tej Tirado MD  9/6/2022 1:59 PM CDT Workstation: [id number]                                     CT Abdomen Pelvis With Contrast (Final result)  Result time 09/06/22 14:06:57      Final result by Jasbir Adams IV, MD (09/06/22 14:06:57)                   Narrative:    All CT scans at this facility use dose modulation, degenerative reconstruction  and/or weight-based dosing when appropriate to reduce radiation dose to as low as reasonably achievable.    CT of the abdomen with contrast and CT of the pelvis with contrast    HISTORY: Abdominal pain.    The study utilizes 100 cc of intravenous nonionic contrast material.    Comparison 6/5/2017.    There is mild motion degradation.    The liver is normal in size. There are no focal hepatic parenchymal abnormality is demonstrated. The gallbladder and biliary system appear unremarkable. The pancreas and adrenal glands are normal in appearance. The spleen is apparently surgically absent.    The kidneys are normal in size and position. There are several left renal cortical cysts and probable additional subcentimeter cysts. Also, probable subcentimeter cysts are observed in the upper and mid pole of the right kidney. There is an 8 mm nonobstructing right lower pole renal calculus. There is no hydronephrosis.    Atheromatous changes are observed within the wall of the abdominal aorta and branches. There is fusiform dilatation of the infrarenal segment of the aorta measuring up to 2.3 cm transversely. There appears to be a small area of chronic dissection along the rightward aspect of the infrarenal aorta. There is associated plaque and mural thrombus formation.    There is no bowel wall  thickening or evidence of obstruction. There are a few colonic diverticuli but there are no findings of acute diverticulitis.    No focal inflammatory changes or observed. There is no adenopathy or significant free fluid.    The urinary bladder is unopacified and incompletely distended but appears grossly unremarkable.    There are no pelvic masses. There are multiple radiodense seeds about the periphery of the prostate gland.    IMPRESSION:    Scattered colonic diverticuli without evidence of acute diverticulitis.    Renal cortical cysts and additional probable subcentimeter cysts.    8mm nonobstructing right lower pole renal calculus.    Arteriosclerosis. There is fusiform dilatation of the infrarenal segment of the aorta.    Additional observations as above.        Electronically signed by:  Jasbir Adams MD  9/6/2022 2:06 PM CDT Workstation: [id number]                                     Medications   pantoprazole injection 40 mg (40 mg Intravenous Given 9/7/22 0948)   0.9%  NaCl infusion ( Intravenous New Bag 9/6/22 1926)   piperacillin-tazobactam 3.375 g in dextrose 5 % 50 mL IVPB (ready to mix system) (0 g Intravenous Stopped 9/7/22 1350)   montelukast tablet 10 mg (10 mg Oral Given 9/6/22 2030)   metoprolol succinate (TOPROL-XL) 24 hr tablet 25 mg (25 mg Oral Given 9/7/22 0948)   atorvastatin tablet 40 mg (40 mg Oral Not Given 9/6/22 2030)   lisinopriL tablet 10 mg (10 mg Oral Given 9/7/22 0948)   sodium chloride 0.9% flush 10 mL (has no administration in time range)   albuterol-ipratropium 2.5 mg-0.5 mg/3 mL nebulizer solution 3 mL (has no administration in time range)   melatonin tablet 6 mg (has no administration in time range)   ondansetron injection 4 mg (has no administration in time range)   polyethylene glycol packet 17 g (has no administration in time range)   senna-docusate 8.6-50 mg per tablet 1 tablet (has no administration in time range)   acetaminophen tablet 650 mg (has no administration in  time range)   simethicone chewable tablet 80 mg (has no administration in time range)   HYDROcodone-acetaminophen 5-325 mg per tablet 1 tablet (1 tablet Oral Given 9/7/22 0948)   morphine injection 4 mg (4 mg Intravenous Given 9/6/22 2122)   naloxone 0.4 mg/mL injection 0.02 mg (has no administration in time range)   glucose chewable tablet 16 g (has no administration in time range)   glucose chewable tablet 24 g (has no administration in time range)   dextrose 50% injection 12.5 g (has no administration in time range)   dextrose 50% injection 25 g (has no administration in time range)   glucagon (human recombinant) injection 1 mg (has no administration in time range)   insulin aspart U-100 pen 1-12 Units (has no administration in time range)   potassium chloride SA CR tablet 20 mEq (has no administration in time range)   potassium chloride SA CR tablet 40 mEq (40 mEq Oral Given 9/7/22 0500)   potassium chloride SA CR tablet 20 mEq (has no administration in time range)   potassium chloride SA CR tablet 40 mEq (has no administration in time range)   potassium chloride 10 mEq in 100 mL IVPB (has no administration in time range)   potassium chloride 10 mEq in 100 mL IVPB (has no administration in time range)   potassium chloride 10 mEq in 100 mL IVPB (has no administration in time range)   potassium chloride 10 mEq in 100 mL IVPB (has no administration in time range)   magnesium oxide tablet 800 mg (has no administration in time range)   magnesium sulfate 2g in water 50mL IVPB (premix) (has no administration in time range)   magnesium sulfate in dextrose IVPB (premix) 1 g (has no administration in time range)   magnesium sulfate 2g in water 50mL IVPB (premix) (0 g Intravenous Stopped 9/7/22 0657)   magnesium sulfate 2g in water 50mL IVPB (premix) (has no administration in time range)   sodium phosphate 15 mmol in dextrose 5 % 250 mL IVPB (has no administration in time range)   sodium phosphate 20.01 mmol in dextrose 5  % 250 mL IVPB (has no administration in time range)   sodium phosphate 15 mmol in dextrose 5 % 250 mL IVPB (has no administration in time range)   sodium phosphate 30 mmol in dextrose 5 % 250 mL IVPB (has no administration in time range)   sodium phosphate 20.01 mmol in dextrose 5 % 250 mL IVPB (has no administration in time range)   calcium chloride 1 g in dextrose 5 % 100 mL IVPB (has no administration in time range)   calcium chloride 1 g in dextrose 5 % 100 mL IVPB (has no administration in time range)   calcium chloride 1 g in dextrose 5 % 100 mL IVPB (has no administration in time range)   labetaloL injection 10 mg (10 mg Intravenous Given 9/6/22 2306)   docusate sodium capsule 100 mg (has no administration in time range)   polyethylene glycol packet 17 g (17 g Oral Given 9/7/22 1558)   mupirocin 2 % ointment (has no administration in time range)   chlorhexidine 0.12 % solution 15 mL (has no administration in time range)   sodium chloride 0.9% bolus 1,000 mL (0 mLs Intravenous Stopped 9/6/22 1459)   iohexoL (OMNIPAQUE 350) injection 100 mL (100 mLs Intravenous Given 9/6/22 1328)     Medical Decision Making:   Clinical Tests:   Lab Tests: Reviewed  Radiological Study: Reviewed  Medical Tests: Reviewed  ED Management:  Lipase is elevated, CT with no evidence of acute diverticulitis or ureterolithiasis as was patient's concern.  There are findings of an infrarenal abdominal aortic aneurysm under but no as previously to the patient.  Evidence of mural thrombus present however no evidence of rupture or signs to suggest other acute pathology regarding this however I have discussed the case in detail with Dr. Up of the vascular surgery service.  No acute intervention recommended regarding this finding but he states he will need outpatient evaluation with vascular surgery.  As patient's lipase is markedly elevated however with persistent abdominal pain I have discussed the case with the hospitalist provider who  has assumed care and will admit for comprehensive evaluation treatment and Gastroenterology consultation.  Care of the patient assumed by the hospitalist provider.                    Clinical Impression:   Final diagnoses:  [R10.9] Abdominal pain  [K85.90] Pancreatitis        ED Disposition Condition    Observation                 Heidy Radford MD  09/07/22 8636

## 2022-09-06 NOTE — H&P
[facility]   History & Physical     Patient Name: Aleksandra Keys  MRN: [id number]  Admission Date: 9/6/2022 12:10 PM  Attending Physician: Whitney Cummings MD  Primary Care Provider: Vicente Orosco MD  Face-to-Face encounter date: 09/06/2022    Patient information was obtained from patient, past medical records, ER physician, and ER records.     HISTORY OF PRESENT ILLNESS:     Aleksandra Keys is a 66 y.o. White male   With PMH of kidney stones, diverticulitis,  Former smoker over 20 years ago,   CAD, ITP, HTN, DM2,   who presents with abdominal pain.    Onset 3 weeks ago  Constant, persistent  Located LLQ with radiation across abdomen  Pt initially thought it was diverticulitis, as it feels similar   He saw his PCP, who prescribed ciprofloxacin and flagyl  Pain did not alleviate after 10 days therapy  PCP prescribed 5 more days of abx therapy  Pt denies relief of symptoms since  No fever or chills  +intermittent diarrhea with intermittent constipation  +epigastric pain with belching   No dysuria  No n/v  +decreased appetite  No LE weakness    REVIEW OF SYSTEMS:     All systems reviewed and are negative except as noted per above.    PAST MEDICAL HISTORY:     Past Medical History:   Diagnosis Date    AK (actinic keratosis)     Anxiety     Benign hypertension     Diabetes mellitus, type 2     Diverticulitis     Fatty liver     GERD (gastroesophageal reflux disease)     Hyperlipidemia     ITP (idiopathic thrombocytopenic purpura)     Occlusive coronary artery disease        PAST SURGICAL HISTORY:     Past Surgical History:   Procedure Laterality Date    COLONOSCOPY  04/11/2016    Dr. Abbott, multiple polyps, recheck 6 month    CORONARY ARTERY BYPASS GRAFT      EYE SURGERY      SPLENECTOMY, TOTAL         ALLERGIES:   Dapagliflozin and Niacin    FAMILY HISTORY:     Family History   Problem Relation Age of Onset    Diabetes Mother     Hypertension Mother     Skin cancer Mother     Diabetes  Brother     Heart disease Brother     Hyperlipidemia Father     Hypertension Father     Diabetes Maternal Aunt     Heart disease Maternal Uncle     Cancer Paternal Aunt         thyroid    Heart disease Paternal Uncle     Cancer Paternal Uncle         lung    Diabetes Maternal Grandmother     Heart disease Maternal Grandfather     Heart disease Paternal Grandfather     No Known Problems Son     Melanoma Neg Hx     Psoriasis Neg Hx     Lupus Neg Hx     Eczema Neg Hx        SOCIAL HISTORY:     Social History     Tobacco Use    Smoking status: Former     Types: Cigarettes     Quit date: 10/9/2010     Years since quittin.9    Smokeless tobacco: Never   Substance Use Topics    Alcohol use: Yes        Social History     Substance and Sexual Activity   Sexual Activity Not on file        HOME MEDICATIONS:     Prior to Admission medications    Medication Sig Start Date End Date Taking? Authorizing Provider   atorvastatin (LIPITOR) 40 MG tablet Take 1 tablet (40 mg total) by mouth every evening. 20  Yes MAGY Javier   ciprofloxacin HCl (CIPRO) 500 MG tablet Take 500 mg by mouth 2 (two) times a day. 22 Yes Historical Provider   lisinopriL (PRINIVIL,ZESTRIL) 5 MG tablet Take 1 tablet (5 mg total) by mouth once daily.  Patient taking differently: Take 10 mg by mouth once daily. 3/22/21  Yes MAGY Javier   metFORMIN (GLUCOPHAGE-XR) 500 MG ER 24hr tablet TAKE 2 TABLETS BY MOUTH IN THE MORNING AND 1 TABLET BY MOUTH IN THE EVENING 21  Yes Vicente Orosco MD   metoprolol succinate (TOPROL-XL) 25 MG 24 hr tablet Take 1 tablet (25 mg total) by mouth once daily. 21  Yes Vicente Orosco MD   metroNIDAZOLE (FLAGYL) 500 MG tablet Take 500 mg by mouth 3 (three) times daily. 22 Yes Historical Provider   montelukast (SINGULAIR) 10 mg tablet Take 10 mg by mouth every evening. 21  Yes Historical Provider   aspirin 81 mg Tab Take 1 tablet by mouth once daily.  11   Sanjeev SPENCER  "MD Michael   azelastine (ASTELIN) 137 mcg (0.1 %) nasal spray 1 spray (137 mcg total) by Nasal route 2 (two) times daily. 3/1/21 3/1/22  Genevieve Vicente MD   fluticasone propionate (FLONASE) 50 mcg/actuation nasal spray 1 spray (50 mcg total) by Each Nostril route 2 (two) times daily. 5/20/21 9/6/22  Emilia Florian MD   prednisoLONE acetate (PRED FORTE) 1 % DrpS INSTILL 1 DROP INTO RIGHT EYE THREE TIMES DAILY FOR 14 DAYS 4/6/21 9/6/22  Historical Provider   semaglutide (RYBELSUS) 7 mg tablet Take 1 tablet (7 mg total) by mouth once daily. 3/22/21 9/6/22  Tara Shields, MAGY       PHYSICAL EXAM:     /68   Pulse (!) 56   Temp 97.7 °F (36.5 °C) (Oral)   Resp 19   Ht 5' 1" (1.549 m)   Wt 79.4 kg (175 lb)   SpO2 95%   BMI 33.07 kg/m²     Gen: alert, responsive, appears comfortable  HEENT:  Eyes - no pallor  External ears with no lesions  Nares patent  Mouth - lips chapped  CV: RRR  Lungs: CTA B/L  Abd: +BS, soft, +generalized TTP, ND  Ext: no atrophy or edema, +LOWER EXTREMITIES NEUROVASCULARLY INTACT   Skin: warm, dry  Neuro: grossly intact  Psych: pleasant     LABS AND IMAGING:     Labs Reviewed   CBC W/ AUTO DIFFERENTIAL - Abnormal; Notable for the following components:       Result Value    MCH 34.2 (*)     All other components within normal limits   COMPREHENSIVE METABOLIC PANEL - Abnormal; Notable for the following components:    Glucose 139 (*)     Total Bilirubin 1.2 (*)     All other components within normal limits   LIPASE - Abnormal; Notable for the following components:    Lipase 189 (*)     All other components within normal limits   URINALYSIS, REFLEX TO URINE CULTURE - Abnormal; Notable for the following components:    Protein, UA Trace (*)     Glucose, UA Trace (*)     Ketones, UA 1+ (*)     Leukocytes, UA Trace (*)     All other components within normal limits    Narrative:     Specimen Source->Urine   URINALYSIS MICROSCOPIC - Abnormal; Notable for the following components:    Hyaline " Casts, UA 8 (*)     All other components within normal limits    Narrative:     Specimen Source->Urine   CULTURE, BLOOD   CULTURE, BLOOD   CK   APTT   MAGNESIUM   PROTIME-INR   TROPONIN I   TSH   MAGNESIUM   TSH   TROPONIN I   ALCOHOL,MEDICAL (ETHANOL)   DRUG SCREEN PANEL, URINE EMERGENCY   SARS-COV-2 RNA AMPLIFICATION, QUAL   LIPID PANEL   TROPONIN I     Imaging Results              US Abdomen Limited (Final result)  Result time 09/06/22 17:35:50      Final result by Tej Tirado MD (09/06/22 17:35:50)                   Narrative:    Abdominal ultrasound Limited    CLINICAL DATA: Right upper quadrant pain    FINDINGS: Sonographic assessment targeted to the right upper quadrant is compared to a same date CT examination.    The pancreas is mostly obscured by bowel gas.    Visualized portions of aorta and inferior vena cava are unremarkable. The liver has a normal appearance. The gallbladder and biliary tree are unremarkable. Common bile duct measures 3 mm.    Hepatopedal flow is noted within the portal vein. 8-9 mm nonobstructing lower pole right renal calculus is noted, with small lower pole cyst. There is no right upper quadrant free fluid.    IMPRESSION:  1. Normal appearance of the gallbladder and biliary tree.  2. Nonobstructing right renal calculus and small right renal cyst.  3. Poor visualization of the pancreas.    Electronically signed by:  Tej Tirado MD  9/6/2022 5:35 PM CDT Workstation: [id number]                                     X-Ray Chest AP Portable (Final result)  Result time 09/06/22 13:59:12   Procedure changed from X-Ray Chest 1 View     Final result by Tej Tirado MD (09/06/22 13:59:12)                   Narrative:    Chest single view    CLINICAL DATA: Abdominal pain    FINDINGS: The heart is borderline enlarged. There has been previous median sternotomy. The lungs are clear.    IMPRESSION:  1. No acute radiographic abnormalities.  2. Borderline cardiomegaly. Post  sternotomy changes.    Electronically signed by:  Tej Tirado MD  9/6/2022 1:59 PM CDT Workstation: [id number]                                     CT Abdomen Pelvis With Contrast (Final result)  Result time 09/06/22 14:06:57      Final result by Jasbir Adams IV, MD (09/06/22 14:06:57)                   Narrative:    All CT scans at this facility use dose modulation, degenerative reconstruction  and/or weight-based dosing when appropriate to reduce radiation dose to as low as reasonably achievable.    CT of the abdomen with contrast and CT of the pelvis with contrast    HISTORY: Abdominal pain.    The study utilizes 100 cc of intravenous nonionic contrast material.    Comparison 6/5/2017.    There is mild motion degradation.    The liver is normal in size. There are no focal hepatic parenchymal abnormality is demonstrated. The gallbladder and biliary system appear unremarkable. The pancreas and adrenal glands are normal in appearance. The spleen is apparently surgically absent.    The kidneys are normal in size and position. There are several left renal cortical cysts and probable additional subcentimeter cysts. Also, probable subcentimeter cysts are observed in the upper and mid pole of the right kidney. There is an 8 mm nonobstructing right lower pole renal calculus. There is no hydronephrosis.    Atheromatous changes are observed within the wall of the abdominal aorta and branches. There is fusiform dilatation of the infrarenal segment of the aorta measuring up to 2.3 cm transversely. There appears to be a small area of chronic dissection along the rightward aspect of the infrarenal aorta. There is associated plaque and mural thrombus formation.    There is no bowel wall thickening or evidence of obstruction. There are a few colonic diverticuli but there are no findings of acute diverticulitis.    No focal inflammatory changes or observed. There is no adenopathy or significant free fluid.    The urinary  bladder is unopacified and incompletely distended but appears grossly unremarkable.    There are no pelvic masses. There are multiple radiodense seeds about the periphery of the prostate gland.    IMPRESSION:    Scattered colonic diverticuli without evidence of acute diverticulitis.    Renal cortical cysts and additional probable subcentimeter cysts.    8mm nonobstructing right lower pole renal calculus.    Arteriosclerosis. There is fusiform dilatation of the infrarenal segment of the aorta.    Additional observations as above.        Electronically signed by:  Jasbir Adams MD  9/6/2022 2:06 PM CDT Workstation: [id number]                                        ASSESSMENT & PLAN:   Aleksandra Keys is a 66 y.o. 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        Plan    Abdominal aortic dissection with mural thrombus  - ?chronic per CT scan report  - vascular surgery consulted in ER and is following, thank you  - holding anticoagulation   - EKG, trending troponin, telemetry monitoring  - labetalol prn HR and BP control  - neurovascular checks  - monitor in ICU    Acute pancreatitis  - aggressive IVFs; pt denies CHF  - pain control  - RUQ US  - lipids  - pt denies alcohol usage    Chronic conditions as noted above/below; home medications reviewed personally by me and restarted as appropriate  Electrolyte derangement:  Trending BMP; Mg; replacement prn  DVT ppx: SCDs  FULL  CODE    Whitney Cummings MD  [facility] Hospitalist  09/06/2022

## 2022-09-07 LAB
AMPHET+METHAMPHET UR QL: NORMAL
ANION GAP SERPL CALC-SCNC: 5 MMOL/L (ref 8–16)
APTT PPP: 27.9 SEC (ref 23.3–35.1)
BARBITURATES UR QL SCN>200 NG/ML: NEGATIVE
BASOPHILS # BLD AUTO: 0.08 K/UL (ref 0–0.2)
BASOPHILS NFR BLD: 0.8 % (ref 0–1.9)
BENZODIAZ UR QL SCN>200 NG/ML: NORMAL
BUN SERPL-MCNC: 12 MG/DL (ref 8–23)
BZE UR QL SCN: NORMAL
CALCIUM SERPL-MCNC: 7.8 MG/DL (ref 8.7–10.5)
CANNABINOIDS UR QL SCN: NORMAL
CHLORIDE SERPL-SCNC: 108 MMOL/L (ref 95–110)
CO2 SERPL-SCNC: 25 MMOL/L (ref 23–29)
CREAT SERPL-MCNC: 0.9 MG/DL (ref 0.5–1.4)
CREAT UR-MCNC: 220 MG/DL (ref 23–375)
D DIMER PPP IA.FEU-MCNC: 0.46 UG/ML FEU
DIFFERENTIAL METHOD: ABNORMAL
EOSINOPHIL # BLD AUTO: 0.3 K/UL (ref 0–0.5)
EOSINOPHIL NFR BLD: 3 % (ref 0–8)
ERYTHROCYTE [DISTWIDTH] IN BLOOD BY AUTOMATED COUNT: 12 % (ref 11.5–14.5)
EST. GFR  (NO RACE VARIABLE): >60 ML/MIN/1.73 M^2
ESTIMATED AVG GLUCOSE: 143 MG/DL (ref 68–131)
GLUCOSE SERPL-MCNC: 138 MG/DL (ref 70–110)
GLUCOSE SERPL-MCNC: 142 MG/DL (ref 70–110)
GLUCOSE SERPL-MCNC: 144 MG/DL (ref 70–110)
HBA1C MFR BLD: 6.6 % (ref 4.5–6.2)
HCT VFR BLD AUTO: 43.3 % (ref 40–54)
HGB BLD-MCNC: 15.2 G/DL (ref 14–18)
IMM GRANULOCYTES # BLD AUTO: 0.04 K/UL (ref 0–0.04)
IMM GRANULOCYTES NFR BLD AUTO: 0.4 % (ref 0–0.5)
INR PPP: 1.3
LDH SERPL L TO P-CCNC: 119 U/L (ref 110–260)
LIPASE SERPL-CCNC: 133 U/L (ref 4–60)
LYMPHOCYTES # BLD AUTO: 3.9 K/UL (ref 1–4.8)
LYMPHOCYTES NFR BLD: 40.6 % (ref 18–48)
MAGNESIUM SERPL-MCNC: 1.5 MG/DL (ref 1.6–2.6)
MAGNESIUM SERPL-MCNC: 2.6 MG/DL (ref 1.6–2.6)
MCH RBC QN AUTO: 34.5 PG (ref 27–31)
MCHC RBC AUTO-ENTMCNC: 35.1 G/DL (ref 32–36)
MCV RBC AUTO: 98 FL (ref 82–98)
METHADONE UR QL SCN>300 NG/ML: NORMAL
MONOCYTES # BLD AUTO: 0.9 K/UL (ref 0.3–1)
MONOCYTES NFR BLD: 8.9 % (ref 4–15)
NEUTROPHILS # BLD AUTO: 4.4 K/UL (ref 1.8–7.7)
NEUTROPHILS NFR BLD: 46.3 % (ref 38–73)
NRBC BLD-RTO: 0 /100 WBC
OPIATES UR QL SCN: NORMAL
PCP UR QL SCN>25 NG/ML: NORMAL
PLATELET # BLD AUTO: 215 K/UL (ref 150–450)
PMV BLD AUTO: 10.7 FL (ref 9.2–12.9)
POTASSIUM SERPL-SCNC: 3.6 MMOL/L (ref 3.5–5.1)
POTASSIUM SERPL-SCNC: 4.1 MMOL/L (ref 3.5–5.1)
PROTHROMBIN TIME: 15 SEC (ref 11.4–13.7)
RBC # BLD AUTO: 4.41 M/UL (ref 4.6–6.2)
SODIUM SERPL-SCNC: 138 MMOL/L (ref 136–145)
TOXICOLOGY INFORMATION: NORMAL
TROPONIN I SERPL DL<=0.01 NG/ML-MCNC: <0.03 NG/ML
WBC # BLD AUTO: 9.56 K/UL (ref 3.9–12.7)

## 2022-09-07 PROCEDURE — 25000003 PHARM REV CODE 250: Performed by: FAMILY MEDICINE

## 2022-09-07 PROCEDURE — 25000003 PHARM REV CODE 250

## 2022-09-07 PROCEDURE — 63600175 PHARM REV CODE 636 W HCPCS: Performed by: FAMILY MEDICINE

## 2022-09-07 PROCEDURE — 84132 ASSAY OF SERUM POTASSIUM: CPT | Performed by: FAMILY MEDICINE

## 2022-09-07 PROCEDURE — G0378 HOSPITAL OBSERVATION PER HR: HCPCS

## 2022-09-07 PROCEDURE — 96361 HYDRATE IV INFUSION ADD-ON: CPT

## 2022-09-07 PROCEDURE — 27000221 HC OXYGEN, UP TO 24 HOURS

## 2022-09-07 PROCEDURE — 84484 ASSAY OF TROPONIN QUANT: CPT | Performed by: FAMILY MEDICINE

## 2022-09-07 PROCEDURE — 83735 ASSAY OF MAGNESIUM: CPT | Mod: 91 | Performed by: FAMILY MEDICINE

## 2022-09-07 PROCEDURE — C9113 INJ PANTOPRAZOLE SODIUM, VIA: HCPCS | Performed by: FAMILY MEDICINE

## 2022-09-07 PROCEDURE — 83615 LACTATE (LD) (LDH) ENZYME: CPT | Performed by: FAMILY MEDICINE

## 2022-09-07 PROCEDURE — 85610 PROTHROMBIN TIME: CPT | Performed by: FAMILY MEDICINE

## 2022-09-07 PROCEDURE — 85730 THROMBOPLASTIN TIME PARTIAL: CPT | Performed by: FAMILY MEDICINE

## 2022-09-07 PROCEDURE — 85379 FIBRIN DEGRADATION QUANT: CPT | Performed by: FAMILY MEDICINE

## 2022-09-07 PROCEDURE — 85025 COMPLETE CBC W/AUTO DIFF WBC: CPT | Performed by: FAMILY MEDICINE

## 2022-09-07 PROCEDURE — 25000003 PHARM REV CODE 250: Performed by: INTERNAL MEDICINE

## 2022-09-07 PROCEDURE — 80048 BASIC METABOLIC PNL TOTAL CA: CPT | Performed by: FAMILY MEDICINE

## 2022-09-07 PROCEDURE — 96366 THER/PROPH/DIAG IV INF ADDON: CPT

## 2022-09-07 PROCEDURE — 96375 TX/PRO/DX INJ NEW DRUG ADDON: CPT

## 2022-09-07 PROCEDURE — 96367 TX/PROPH/DG ADDL SEQ IV INF: CPT

## 2022-09-07 PROCEDURE — 99900031 HC PATIENT EDUCATION (STAT)

## 2022-09-07 PROCEDURE — 83690 ASSAY OF LIPASE: CPT | Performed by: FAMILY MEDICINE

## 2022-09-07 PROCEDURE — 99900035 HC TECH TIME PER 15 MIN (STAT)

## 2022-09-07 PROCEDURE — 94799 UNLISTED PULMONARY SVC/PX: CPT

## 2022-09-07 RX ORDER — CHLORHEXIDINE GLUCONATE ORAL RINSE 1.2 MG/ML
15 SOLUTION DENTAL 2 TIMES DAILY
Status: DISCONTINUED | OUTPATIENT
Start: 2022-09-07 | End: 2022-09-09 | Stop reason: HOSPADM

## 2022-09-07 RX ORDER — POLYETHYLENE GLYCOL 3350 17 G/17G
17 POWDER, FOR SOLUTION ORAL DAILY
Status: DISCONTINUED | OUTPATIENT
Start: 2022-09-07 | End: 2022-09-09

## 2022-09-07 RX ORDER — MUPIROCIN 20 MG/G
OINTMENT TOPICAL 2 TIMES DAILY
Status: DISCONTINUED | OUTPATIENT
Start: 2022-09-07 | End: 2022-09-09 | Stop reason: HOSPADM

## 2022-09-07 RX ORDER — DOCUSATE SODIUM 100 MG/1
100 CAPSULE, LIQUID FILLED ORAL 2 TIMES DAILY
Status: DISCONTINUED | OUTPATIENT
Start: 2022-09-07 | End: 2022-09-09 | Stop reason: HOSPADM

## 2022-09-07 RX ADMIN — PIPERACILLIN SODIUM AND TAZOBACTAM SODIUM 3.38 G: 3; .375 INJECTION, POWDER, LYOPHILIZED, FOR SOLUTION INTRAVENOUS at 09:09

## 2022-09-07 RX ADMIN — MUPIROCIN 1 G: 20 OINTMENT TOPICAL at 08:09

## 2022-09-07 RX ADMIN — CHLORHEXIDINE GLUCONATE 15 ML: 1.2 RINSE ORAL at 08:09

## 2022-09-07 RX ADMIN — ATORVASTATIN CALCIUM 40 MG: 40 TABLET, FILM COATED ORAL at 08:09

## 2022-09-07 RX ADMIN — POTASSIUM CHLORIDE 40 MEQ: 1500 TABLET, EXTENDED RELEASE ORAL at 05:09

## 2022-09-07 RX ADMIN — LISINOPRIL 10 MG: 10 TABLET ORAL at 09:09

## 2022-09-07 RX ADMIN — HYDROCODONE BITARTRATE AND ACETAMINOPHEN 1 TABLET: 5; 325 TABLET ORAL at 09:09

## 2022-09-07 RX ADMIN — MAGNESIUM SULFATE HEPTAHYDRATE 4 G: 40 INJECTION, SOLUTION INTRAVENOUS at 04:09

## 2022-09-07 RX ADMIN — MONTELUKAST 10 MG: 10 TABLET, FILM COATED ORAL at 08:09

## 2022-09-07 RX ADMIN — PANTOPRAZOLE SODIUM 40 MG: 40 INJECTION, POWDER, FOR SOLUTION INTRAVENOUS at 09:09

## 2022-09-07 RX ADMIN — DOCUSATE SODIUM 100 MG: 100 CAPSULE, LIQUID FILLED ORAL at 08:09

## 2022-09-07 RX ADMIN — METOPROLOL SUCCINATE 25 MG: 25 TABLET, EXTENDED RELEASE ORAL at 09:09

## 2022-09-07 RX ADMIN — PIPERACILLIN SODIUM AND TAZOBACTAM SODIUM 3.38 G: 3; .375 INJECTION, POWDER, LYOPHILIZED, FOR SOLUTION INTRAVENOUS at 07:09

## 2022-09-07 RX ADMIN — POLYETHYLENE GLYCOL 3350 17 G: 17 POWDER, FOR SOLUTION ORAL at 03:09

## 2022-09-07 RX ADMIN — PIPERACILLIN SODIUM AND TAZOBACTAM SODIUM 3.38 G: 3; .375 INJECTION, POWDER, LYOPHILIZED, FOR SOLUTION INTRAVENOUS at 02:09

## 2022-09-07 NOTE — NURSING TRANSFER
Nursing Transfer Note      9/6/2022     Reason patient is being transferred: closer monitoring    Transfer To: 3026    Transfer via bed    Transfer with cardiac monitoring and IV pole    Transported by Charge RN and extender    Medicines sent: labetolol, IV Fluids    Any special needs or follow-up needed: no    Chart send with patient: Yes    Notified: sister in law at bs, pt notified spouse    Upon arrival to floor: patient oriented to room, call bell in reach, and bed in lowest position

## 2022-09-07 NOTE — PLAN OF CARE
[facility]  Initial Discharge Assessment       Primary Care Provider: Vicente Orosco MD    Admission Diagnosis: Pancreatitis [K85.90]  Aortic dissection [I71.00]    Admission Date: 9/6/2022  Expected Discharge Date: TBD     met with patient at bedside in room 3026. Patient emplolyed full-time, drives himself, requires no adaptive equipment or services. Will schedule his own PCP follow up appt with DR Stringer, [facility].    Discharge Barriers Identified: None    Payor: BLUE CROSS BLUE SHIELD / Plan: BCBS ALL OUT OF STATE / Product Type: PPO /     Extended Emergency Contact Information  Primary Emergency Contact: Abdiel Keys  Address: [address] [place] [address] [facility] [employer] [place]  Mobile Phone: [phone]  Relation: Son  Preferred language: English   needed? No  Secondary Emergency Contact: Susan Keys  Address: [address]           [place] [id number] [place] [address]  Home Phone: [phone]  Mobile Phone: [phone]  Relation: Spouse    Discharge Plan A: Home with family  Discharge Plan B: Home with family      [facility] - [place] - [address]  [address]  [facility] [address]  Phone: [phone] Fax: [phone]      Initial Assessment (most recent)       Adult Discharge Assessment - 09/07/22 1123          Discharge Assessment    Assessment Type Discharge Planning Assessment     Confirmed/corrected address, phone number and insurance Yes     Confirmed Demographics Correct on Facesheet     Source of Information patient     When was your last doctors appointment? --   one week ago    Communicated IRVING with patient/caregiver Date not available/Unable to determine     Reason For Admission Dissection of abdominal aorta     Lives With spouse     Facility Arrived From: home     Do you expect to return to your current living situation? Yes     Do you have help at home or someone to  help you manage your care at home? Yes     Who are your caregiver(s) and their phone number(s)? Spouse, Susan Keys [phone]     Prior to hospitilization cognitive status: Alert/Oriented     Current cognitive status: Alert/Oriented     Walking or Climbing Stairs Difficulty none     Dressing/Bathing Difficulty none     Equipment Currently Used at Home none     Readmission within 30 days? No     Patient currently being followed by outpatient case management? No     Do you currently have service(s) that help you manage your care at home? No     Do you have prescription coverage? Yes     Do you have any problems affording any of your prescribed medications? No     Is the patient taking medications as prescribed? yes     Who is going to help you get home at discharge? Spouse, Susan Keys [phone]     How do you get to doctors appointments? car, drives self     Are you on dialysis? No     Do you take coumadin? No     Discharge Plan A Home with family     Discharge Plan B Home with family     DME Needed Upon Discharge  none     Discharge Plan discussed with: Patient     Discharge Barriers Identified None        Relationship/Environment    Name(s) of Who Lives With Patient SpouseSusan Massimo [phone]

## 2022-09-07 NOTE — NURSING
Spoke with Dr. Up's nurse on phone as physician preparing for procedure. States no procedure today. Will resume clear liquid diet.

## 2022-09-07 NOTE — PLAN OF CARE
09/07/22 0800   Patient Assessment/Suction   Level of Consciousness (AVPU) alert   Respiratory Effort Unlabored   Expansion/Accessory Muscles/Retractions no use of accessory muscles   All Lung Fields Breath Sounds clear   Rhythm/Pattern, Respiratory unlabored;pattern regular   Cough Frequency no cough   PRE-TX-O2   O2 Device (Oxygen Therapy) nasal cannula   $ Is the patient on Low Flow Oxygen? Yes   Flow (L/min) 2   Aerosol Therapy   $ Aerosol Therapy Charges PRN treatment not required   Education   $ Education Bronchodilator;15 min   Respiratory Evaluation   $ Care Plan Tech Time 15 min   $ Eval/Re-eval Charges Evaluation   Evaluation For New Orders

## 2022-09-07 NOTE — NURSING
Received patient via bed, AAOx3 resp even and unlabored, spouse at bedside, IVHL intact x 2 sites without redness or pain to sites. Bed low call light in reach. Nad

## 2022-09-07 NOTE — CONSULTS
[facility]  Vascular Surgery  Consult Note    Inpatient consult to Vascular Surgery  Consult performed by: Shay Up MD  Consult ordered by: Whitney Cummings MD      Subjective:     Chief Complaint/Reason for Admission:  Left lower quadrant abdominal pain    History of Present Illness:  66-year-old gentleman with a history of multiple episodes of diverticulitis who has been having the left lower quadrant pain for about a month were felt he was having diverticular pain.  Has been treated with antibiotics with no improvement.  He presented to the ER and had a CT scan and some lab work.  He has a mildly elevated lipase he had a CT scan which shows no diverticulitis.  CT scan did note a small focal chronic dissection of the infrarenal aorta just below the renal arteries projecting to the right and also a 2nd focal area of chronic dissection with some Ensure a mural hematoma both of which appear to be less than a cm through the wall.  With a did note that he has a large amount of stool throughout the colon.    Medications Prior to Admission   Medication Sig Dispense Refill Last Dose    atorvastatin (LIPITOR) 40 MG tablet Take 1 tablet (40 mg total) by mouth every evening. 90 tablet 3 9/5/2022 at 20:00    ciprofloxacin HCl (CIPRO) 500 MG tablet Take 500 mg by mouth 2 (two) times a day.   9/6/2022 at 08:00    lisinopriL (PRINIVIL,ZESTRIL) 5 MG tablet Take 1 tablet (5 mg total) by mouth once daily. (Patient taking differently: Take 10 mg by mouth once daily.) 90 tablet 3 9/6/2022 at 08:00    metFORMIN (GLUCOPHAGE-XR) 500 MG ER 24hr tablet TAKE 2 TABLETS BY MOUTH IN THE MORNING AND 1 TABLET BY MOUTH IN THE EVENING 270 tablet 3 9/6/2022 at 08:00    metoprolol succinate (TOPROL-XL) 25 MG 24 hr tablet Take 1 tablet (25 mg total) by mouth once daily. 90 tablet 3 9/6/2022 at 08:00    metroNIDAZOLE (FLAGYL) 500 MG tablet Take 500 mg by mouth 3 (three) times daily.   9/6/2022 at 12:00    montelukast  (SINGULAIR) 10 mg tablet Take 10 mg by mouth every evening.   2022 at 20:00    aspirin 81 mg Tab Take 1 tablet by mouth once daily.    More than a month    azelastine (ASTELIN) 137 mcg (0.1 %) nasal spray 1 spray (137 mcg total) by Nasal route 2 (two) times daily. 30 mL 6        Review of patient's allergies indicates:   Allergen Reactions    Dapagliflozin Other (See Comments)     myalgia    Niacin        Past Medical History:   Diagnosis Date    AK (actinic keratosis)     Anxiety     Benign hypertension     Diabetes mellitus, type 2     Diverticulitis     Fatty liver     GERD (gastroesophageal reflux disease)     Hyperlipidemia     ITP (idiopathic thrombocytopenic purpura)     Occlusive coronary artery disease      Past Surgical History:   Procedure Laterality Date    COLONOSCOPY  2016    Dr. Abbott, multiple polyps, recheck 6 month    CORONARY ARTERY BYPASS GRAFT      EYE SURGERY      SPLENECTOMY, TOTAL       Family History       Problem Relation (Age of Onset)    Cancer Paternal Aunt, Paternal Uncle    Diabetes Mother, Brother, Maternal Aunt, Maternal Grandmother    Heart disease Brother, Maternal Uncle, Paternal Uncle, Maternal Grandfather, Paternal Grandfather    Hyperlipidemia Father    Hypertension Mother, Father    No Known Problems Son    Skin cancer Mother          Tobacco Use    Smoking status: Former     Types: Cigarettes     Quit date: 10/9/2010     Years since quittin.9    Smokeless tobacco: Never   Substance and Sexual Activity    Alcohol use: Yes    Drug use: No    Sexual activity: Not on file     Review of Systems   Respiratory: Negative.     Gastrointestinal:  Positive for abdominal pain and constipation. Negative for diarrhea.   Musculoskeletal: Negative.    Skin: Negative.    Neurological: Negative.    Objective:     Vital Signs (Most Recent):  Temp: 98.1 °F (36.7 °C) (22 1101)  Pulse: (!) 53 (22 1300)  Resp: 14 (22 1300)  BP: (!) 95/54 (22 1300)  SpO2:  (!) 94 % (09/07/22 1300)   Vital Signs (24h Range):  Temp:  [97.7 °F (36.5 °C)-98.9 °F (37.2 °C)] 98.1 °F (36.7 °C)  Pulse:  [52-73] 53  Resp:  [13-30] 14  SpO2:  [91 %-98 %] 94 %  BP: ()/(51-81) 95/54     Weight: 82.7 kg (182 lb 5.1 oz)  Body mass index is 34.47 kg/m².    Date 09/07/22 0700 - 09/08/22 0659   Shift 1610-7131 7686-4024 7135-5611 24 Hour Total   INTAKE   Shift Total(mL/kg)       OUTPUT   Urine(mL/kg/hr) 975   975   Shift Total(mL/kg) 975(11.8)   975(11.8)   Weight (kg) 82.7 82.7 82.7 82.7       Physical Exam  HENT:      Head: Normocephalic.   Cardiovascular:      Rate and Rhythm: Normal rate.      Pulses:           Dorsalis pedis pulses are 2+ on the right side and 2+ on the left side.   Pulmonary:      Breath sounds: Normal breath sounds.   Abdominal:      Palpations: Abdomen is soft.      Comments: Left lower quadrant tenderness to palpation   Musculoskeletal:         General: Normal range of motion.      Cervical back: Normal range of motion.   Skin:     General: Skin is warm and dry.   Neurological:      Mental Status: He is alert and oriented to person, place, and time.       Significant Labs:      Significant Diagnostics:  I have reviewed all pertinent imaging results/findings within the past 24 hours.    Assessment/Plan:   Small incidental finding of chronic appearing infrarenal focal dissection with minimal  Aneurysmal change.  No findings requiring intervention of the infrarenal aorta.  Symptoms may be related to diffuse stool throughout his colon, may be constipation secondary to diverticular stricture.  I want to consider laxative.  Active Diagnoses:    Diagnosis Date Noted POA    PRINCIPAL PROBLEM:  Dissection of abdominal aorta [I71.02] 09/06/2022 Yes    CAD (coronary artery disease) [I25.10] 09/06/2022 Yes    Aortic mural thrombus [I74.10] 09/06/2022 Yes    Acute pancreatitis [K85.90] 09/06/2022 Yes    H/O splenectomy [Z90.81] 05/20/2021 Not Applicable    IgG3 subclass deficiency  [D80.3] 05/20/2021 Yes    ITP (idiopathic thrombocytopenic purpura) [D69.3]  Yes    GERD (gastroesophageal reflux disease) [K21.9]  Yes    Hypertension associated with diabetes [E11.59, I15.2]  Yes      Problems Resolved During this Admission:       Thank you for your consult. I will sign off. Please contact us if you have any additional questions.    Shay Up MD  Vascular Surgery  [facility]

## 2022-09-07 NOTE — NURSING
0030- MD Loja notified pulses are +2/+2 except for R dorsalis pedis. Dorsalis pedis pulse on the R audible with doppler but pulse is not palpable. Pt stated when he had his CABG here at [facility] back in 2010 his R saphenous for the procedure.

## 2022-09-08 LAB
ANION GAP SERPL CALC-SCNC: 7 MMOL/L (ref 8–16)
BASOPHILS # BLD AUTO: 0.06 K/UL (ref 0–0.2)
BASOPHILS NFR BLD: 0.7 % (ref 0–1.9)
BUN SERPL-MCNC: 8 MG/DL (ref 8–23)
CALCIUM SERPL-MCNC: 8.2 MG/DL (ref 8.7–10.5)
CHLORIDE SERPL-SCNC: 109 MMOL/L (ref 95–110)
CHOLEST SERPL-MCNC: 112 MG/DL (ref 120–199)
CHOLEST/HDLC SERPL: 4 {RATIO} (ref 2–5)
CO2 SERPL-SCNC: 24 MMOL/L (ref 23–29)
CREAT SERPL-MCNC: 1 MG/DL (ref 0.5–1.4)
DIFFERENTIAL METHOD: ABNORMAL
EOSINOPHIL # BLD AUTO: 0.4 K/UL (ref 0–0.5)
EOSINOPHIL NFR BLD: 4.7 % (ref 0–8)
ERYTHROCYTE [DISTWIDTH] IN BLOOD BY AUTOMATED COUNT: 12.1 % (ref 11.5–14.5)
EST. GFR  (NO RACE VARIABLE): >60 ML/MIN/1.73 M^2
GLUCOSE SERPL-MCNC: 130 MG/DL (ref 70–110)
GLUCOSE SERPL-MCNC: 135 MG/DL (ref 70–110)
GLUCOSE SERPL-MCNC: 147 MG/DL (ref 70–110)
GLUCOSE SERPL-MCNC: 152 MG/DL (ref 70–110)
GLUCOSE SERPL-MCNC: 176 MG/DL (ref 70–110)
HCT VFR BLD AUTO: 45.6 % (ref 40–54)
HDLC SERPL-MCNC: 28 MG/DL (ref 40–75)
HDLC SERPL: 25 % (ref 20–50)
HGB BLD-MCNC: 15.8 G/DL (ref 14–18)
IMM GRANULOCYTES # BLD AUTO: 0.03 K/UL (ref 0–0.04)
IMM GRANULOCYTES NFR BLD AUTO: 0.4 % (ref 0–0.5)
LDLC SERPL CALC-MCNC: 49.2 MG/DL (ref 63–159)
LYMPHOCYTES # BLD AUTO: 2.5 K/UL (ref 1–4.8)
LYMPHOCYTES NFR BLD: 29.7 % (ref 18–48)
MAGNESIUM SERPL-MCNC: 2.2 MG/DL (ref 1.6–2.6)
MCH RBC QN AUTO: 34.1 PG (ref 27–31)
MCHC RBC AUTO-ENTMCNC: 34.6 G/DL (ref 32–36)
MCV RBC AUTO: 98 FL (ref 82–98)
MONOCYTES # BLD AUTO: 0.7 K/UL (ref 0.3–1)
MONOCYTES NFR BLD: 8.2 % (ref 4–15)
NEUTROPHILS # BLD AUTO: 4.8 K/UL (ref 1.8–7.7)
NEUTROPHILS NFR BLD: 56.3 % (ref 38–73)
NONHDLC SERPL-MCNC: 84 MG/DL
NRBC BLD-RTO: 0 /100 WBC
PLATELET # BLD AUTO: 180 K/UL (ref 150–450)
PMV BLD AUTO: 10.6 FL (ref 9.2–12.9)
POTASSIUM SERPL-SCNC: 4.3 MMOL/L (ref 3.5–5.1)
RBC # BLD AUTO: 4.64 M/UL (ref 4.6–6.2)
SODIUM SERPL-SCNC: 140 MMOL/L (ref 136–145)
TRIGL SERPL-MCNC: 174 MG/DL (ref 30–150)
WBC # BLD AUTO: 8.56 K/UL (ref 3.9–12.7)

## 2022-09-08 PROCEDURE — 99900035 HC TECH TIME PER 15 MIN (STAT)

## 2022-09-08 PROCEDURE — 96361 HYDRATE IV INFUSION ADD-ON: CPT

## 2022-09-08 PROCEDURE — C9113 INJ PANTOPRAZOLE SODIUM, VIA: HCPCS | Performed by: FAMILY MEDICINE

## 2022-09-08 PROCEDURE — 80048 BASIC METABOLIC PNL TOTAL CA: CPT | Performed by: FAMILY MEDICINE

## 2022-09-08 PROCEDURE — 25000003 PHARM REV CODE 250: Performed by: FAMILY MEDICINE

## 2022-09-08 PROCEDURE — 83735 ASSAY OF MAGNESIUM: CPT | Performed by: FAMILY MEDICINE

## 2022-09-08 PROCEDURE — 85025 COMPLETE CBC W/AUTO DIFF WBC: CPT | Performed by: FAMILY MEDICINE

## 2022-09-08 PROCEDURE — 80061 LIPID PANEL: CPT | Performed by: INTERNAL MEDICINE

## 2022-09-08 PROCEDURE — 96376 TX/PRO/DX INJ SAME DRUG ADON: CPT

## 2022-09-08 PROCEDURE — 36415 COLL VENOUS BLD VENIPUNCTURE: CPT | Performed by: FAMILY MEDICINE

## 2022-09-08 PROCEDURE — 96366 THER/PROPH/DIAG IV INF ADDON: CPT

## 2022-09-08 PROCEDURE — 63600175 PHARM REV CODE 636 W HCPCS: Performed by: FAMILY MEDICINE

## 2022-09-08 PROCEDURE — 25000003 PHARM REV CODE 250

## 2022-09-08 PROCEDURE — 12000002 HC ACUTE/MED SURGE SEMI-PRIVATE ROOM

## 2022-09-08 PROCEDURE — 25000003 PHARM REV CODE 250: Performed by: INTERNAL MEDICINE

## 2022-09-08 RX ADMIN — PANTOPRAZOLE SODIUM 40 MG: 40 INJECTION, POWDER, FOR SOLUTION INTRAVENOUS at 08:09

## 2022-09-08 RX ADMIN — HYDROCODONE BITARTRATE AND ACETAMINOPHEN 1 TABLET: 5; 325 TABLET ORAL at 08:09

## 2022-09-08 RX ADMIN — MUPIROCIN 1 G: 20 OINTMENT TOPICAL at 08:09

## 2022-09-08 RX ADMIN — ATORVASTATIN CALCIUM 40 MG: 40 TABLET, FILM COATED ORAL at 08:09

## 2022-09-08 RX ADMIN — CHLORHEXIDINE GLUCONATE 15 ML: 1.2 RINSE ORAL at 08:09

## 2022-09-08 RX ADMIN — MONTELUKAST 10 MG: 10 TABLET, FILM COATED ORAL at 08:09

## 2022-09-08 RX ADMIN — METOPROLOL SUCCINATE 25 MG: 25 TABLET, EXTENDED RELEASE ORAL at 08:09

## 2022-09-08 RX ADMIN — PIPERACILLIN SODIUM AND TAZOBACTAM SODIUM 3.38 G: 3; .375 INJECTION, POWDER, LYOPHILIZED, FOR SOLUTION INTRAVENOUS at 10:09

## 2022-09-08 RX ADMIN — PIPERACILLIN SODIUM AND TAZOBACTAM SODIUM 3.38 G: 3; .375 INJECTION, POWDER, LYOPHILIZED, FOR SOLUTION INTRAVENOUS at 02:09

## 2022-09-08 RX ADMIN — POLYETHYLENE GLYCOL 3350 17 G: 17 POWDER, FOR SOLUTION ORAL at 08:09

## 2022-09-08 RX ADMIN — DOCUSATE SODIUM 100 MG: 100 CAPSULE, LIQUID FILLED ORAL at 08:09

## 2022-09-08 RX ADMIN — LISINOPRIL 10 MG: 10 TABLET ORAL at 08:09

## 2022-09-08 NOTE — PROGRESS NOTES
"[facility] Medicine  Progress Note    Patient name: Aleksandra Keys  MRN: [id number]  Admit Date: 9/6/2022   LOS: 0 days     SUBJECTIVE:     Principal problem: Dissection of abdominal aorta    Interval History:  Abdominal pain persists but noticeably improved.  He reports abdominal swelling is less.  He had a BM overnight.  Tolerating liquid diet with less pain.  Advancing diet to full liquids and stopping IVFs and IV abx.      Hospital Course:    Patient presented with abdominal pain. He had been treated outpatient for suspected diverticulitis as the etiology of his left sided abdominal pain but did not feel better after completing a course of oral abx.  In the ED, Lipase was elevated suggesting pancreatitis.  CT scan of the abdomen with normal appearing pancreas but did show, "There is fusiform dilatation of the infrarenal segment of the aorta measuring up to 2.3 cm transversely. There appears to be a small area of chronic dissection along the rightward aspect of the infrarenal aorta. There is associated plaque and mural thrombus formation."  He was admitted to the ICU for this aortic pathology for suspected pancreatitis.  Seen by Dr. Up who believes this is chronic and not contributing to current symptoms.  No vascular intervention needed.  He recommended bowel regimen as CT revealing of stool burden.  Mr. Keys' exam does suggest pancreatits with TTP and abdomen swelling. CT ruled out diverticulitis or ureteral stone. Moving out of ICU and continuing pancreatitis care with aggressive IVFs, liquid diet, pain control.  RUQ US with no gallstones.  He does not drink ETOH.  Lipid panel ordered. Review of home meds does not reveal an inciting medication    Scheduled Meds:   atorvastatin  40 mg Oral QHS    chlorhexidine  15 mL Mouth/Throat BID    docusate sodium  100 mg Oral BID    lisinopriL  10 mg Oral Daily    metoprolol succinate  25 mg Oral Daily    montelukast  10 mg Oral QHS    mupirocin "   Nasal BID    pantoprazole  40 mg Intravenous Daily    polyethylene glycol  17 g Oral Daily     Continuous Infusions:      PRN Meds:acetaminophen, albuterol-ipratropium, calcium chloride IVPB, calcium chloride IVPB, calcium chloride IVPB, dextrose 50%, dextrose 50%, glucagon (human recombinant), glucose, glucose, HYDROcodone-acetaminophen, insulin aspart U-100, labetalol, magnesium oxide, magnesium sulfate IVPB, magnesium sulfate IVPB, magnesium sulfate IVPB, magnesium sulfate IVPB, melatonin, morphine, naloxone, ondansetron, polyethylene glycol, potassium chloride in water, potassium chloride in water, potassium chloride in water, potassium chloride in water, potassium chloride, potassium chloride, potassium chloride, potassium chloride, senna-docusate 8.6-50 mg, simethicone, sodium chloride 0.9%, sodium phosphate IVPB, sodium phosphate IVPB, sodium phosphate IVPB, sodium phosphate IVPB, sodium phosphate IVPB    Review of patient's allergies indicates:   Allergen Reactions    Dapagliflozin Other (See Comments)     myalgia    Niacin        Review of Systems: As per interval history    OBJECTIVE:     Vital Signs (Most Recent)  Temp: 97.9 °F (36.6 °C) (09/08/22 1600)  Pulse: (!) 59 (09/08/22 1600)  Resp: 18 (09/08/22 1600)  BP: 99/64 (09/08/22 1600)  SpO2: 95 % (09/08/22 1600)    Vital Signs Range (Last 24H):  Temp:  [97.7 °F (36.5 °C)-98.2 °F (36.8 °C)]   Pulse:  [53-63]   Resp:  [15-18]   BP: ()/(61-66)   SpO2:  [92 %-98 %]     I & O (Last 24H):  Intake/Output Summary (Last 24 hours) at 9/8/2022 1848  Last data filed at 9/8/2022 1649  Gross per 24 hour   Intake 3445.5 ml   Output 900 ml   Net 2545.5 ml         Physical Exam:    Vitals and nursing note reviewed.     Constitutional:       General: Not in acute distress.     Appearance: Well-developed.   HENT:      Head: Normocephalic and atraumatic.   Eyes:      Pupils: Pupils are equal, round, and reactive to light.   Cardiovascular:      Rate and Rhythm:  Regular rhythm.   Pulmonary:      Effort: Pulmonary effort is normal.      Breath sounds: Normal breath sounds. No wheezing.   Abdominal:      General: abd is full, some diffuse TTP but more left than right sided   Musculoskeletal:         General: Normal range of motion.      Cervical back: Normal range of motion.   Skin:     Findings: No rash.   Neurological:      Mental Status: Alert and oriented to person, place, and time.      Cranial Nerves: No cranial nerve deficit.      Sensory: No sensory deficit.     Laboratory:  I have reviewed all pertinent lab results within the past 24 hours.  CBC:   Recent Labs   Lab 09/08/22  0533   WBC 8.56   RBC 4.64   HGB 15.8   HCT 45.6      MCV 98   MCH 34.1*   MCHC 34.6       CMP:   Recent Labs   Lab 09/06/22  1230 09/07/22  0342 09/08/22  0533   *   < > 147*   CALCIUM 9.5   < > 8.2*   ALBUMIN 4.3  --   --    PROT 7.4  --   --       < > 140   K 5.0   < > 4.3   CO2 26   < > 24      < > 109   BUN 14   < > 8   CREATININE 1.0   < > 1.0   ALKPHOS 61  --   --    ALT 28  --   --    AST 35  --   --    BILITOT 1.2*  --   --     < > = values in this interval not displayed.         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         Plan:     -IVFs stopped as not consistently taking in fluids  -IV abx stopped as no infectious etiology isolated thus far.  - pain control with po and IV  narcotics.   -Diet advanced to full liquids  -s/p Vascular evaluation and no interventions needed for chronic appearing dissection.  -Possible discharge tomorrow if still doing well and tolerates diet.       VTE Risk Mitigation (From admission, onward)           Ordered     IP VTE HIGH RISK PATIENT  Once         09/06/22 1822     Place sequential compression device  Until discontinued         09/06/22 1822                      Department Hospital Medicine  [facility]  Anais Marcum MD  Date of service: 09/08/2022

## 2022-09-08 NOTE — PROGRESS NOTES
"[facility]  Progress Note    Patient name: Aleksandra Keys  MRN: [id number]  Admit Date: 9/6/2022   LOS: 0 days     SUBJECTIVE:     Principal problem: Dissection of abdominal aorta    Interval History:  Patient presented with abdominal pain. He had been treated outpatient for suspected diverticulitis as the etiology of his left sided abdominal pain but did not feel better after completing a course of oral abx.  In the ED, Lipase was elevated suggesting pancreatitis.  CT scan of the abdomen with normal appearing pancreas but did show, "There is fusiform dilatation of the infrarenal segment of the aorta measuring up to 2.3 cm transversely. There appears to be a small area of chronic dissection along the rightward aspect of the infrarenal aorta. There is associated plaque and mural thrombus formation."  He was admitted to the ICU for this aortic pathology for suspected pancreatitis.  Seen by Dr. Up who believes this is chronic and not contributing to current symptoms.  No vascular intervention needed.  He recommended bowel regimen as CT revealing of stool burden.  Mr. Keys' exam does suggest pancreatits with TTP and abdomen swelling. CT ruled out diverticulitis or ureteral stone. Moving out of ICU and continuing pancreatitis care with aggressive IVFs, liquid diet, pain control.  RUQ US with no gallstones.  He does not drink ETOH.  Lipid panel ordered. Review of home meds does not reveal an inciting medication.     Hospital Course:    Scheduled Meds:   atorvastatin  40 mg Oral QHS    chlorhexidine  15 mL Mouth/Throat BID    docusate sodium  100 mg Oral BID    lisinopriL  10 mg Oral Daily    metoprolol succinate  25 mg Oral Daily    montelukast  10 mg Oral QHS    mupirocin   Nasal BID    pantoprazole  40 mg Intravenous Daily    piperacillin-tazobactam (ZOSYN) IVPB  3.375 g Intravenous Q8H    polyethylene glycol  17 g Oral Daily     Continuous Infusions:   sodium chloride 0.9% 200 mL/hr " at 09/06/22 1926     PRN Meds:acetaminophen, albuterol-ipratropium, calcium chloride IVPB, calcium chloride IVPB, calcium chloride IVPB, dextrose 50%, dextrose 50%, glucagon (human recombinant), glucose, glucose, HYDROcodone-acetaminophen, insulin aspart U-100, labetalol, magnesium oxide, magnesium sulfate IVPB, magnesium sulfate IVPB, magnesium sulfate IVPB, magnesium sulfate IVPB, melatonin, morphine, naloxone, ondansetron, polyethylene glycol, potassium chloride in water, potassium chloride in water, potassium chloride in water, potassium chloride in water, potassium chloride, potassium chloride, potassium chloride, potassium chloride, senna-docusate 8.6-50 mg, simethicone, sodium chloride 0.9%, sodium phosphate IVPB, sodium phosphate IVPB, sodium phosphate IVPB, sodium phosphate IVPB, sodium phosphate IVPB    Review of patient's allergies indicates:   Allergen Reactions    Dapagliflozin Other (See Comments)     myalgia    Niacin        Review of Systems: As per interval history    OBJECTIVE:     Vital Signs (Most Recent)  Temp: 98.1 °F (36.7 °C) (09/07/22 1101)  Pulse: (!) 53 (09/07/22 1300)  Resp: 14 (09/07/22 1300)  BP: (!) 95/54 (09/07/22 1300)  SpO2: (!) 94 % (09/07/22 1300)    Vital Signs Range (Last 24H):  Temp:  [97.7 °F (36.5 °C)-98.1 °F (36.7 °C)]   Pulse:  [52-73]   Resp:  [13-30]   BP: ()/(51-80)   SpO2:  [91 %-97 %]     I & O (Last 24H):  Intake/Output Summary (Last 24 hours) at 9/7/2022 1947  Last data filed at 9/7/2022 1100  Gross per 24 hour   Intake --   Output 1675 ml   Net -1675 ml       Physical Exam:    Vitals and nursing note reviewed.     Constitutional:       General: Not in acute distress.     Appearance: Well-developed.   HENT:      Head: Normocephalic and atraumatic.   Eyes:      Pupils: Pupils are equal, round, and reactive to light.   Cardiovascular:      Rate and Rhythm: Regular rhythm.   Pulmonary:      Effort: Pulmonary effort is normal.      Breath sounds: Normal breath  sounds. No wheezing.   Abdominal:      General: abd is full, some diffuse TTP but more left than right sided   Musculoskeletal:         General: Normal range of motion.      Cervical back: Normal range of motion.   Skin:     Findings: No rash.   Neurological:      Mental Status: Alert and oriented to person, place, and time.      Cranial Nerves: No cranial nerve deficit.      Sensory: No sensory deficit.     Laboratory:  I have reviewed all pertinent lab results within the past 24 hours.  CBC:   Recent Labs   Lab 09/07/22  0342   WBC 9.56   RBC 4.41*   HGB 15.2   HCT 43.3      MCV 98   MCH 34.5*   MCHC 35.1     CMP:   Recent Labs   Lab 09/06/22  1230 09/07/22  0342 09/07/22  1140   * 138*  --    CALCIUM 9.5 7.8*  --    ALBUMIN 4.3  --   --    PROT 7.4  --   --     138  --    K 5.0 3.6 4.1   CO2 26 25  --     108  --    BUN 14 12  --    CREATININE 1.0 0.9  --    ALKPHOS 61  --   --    ALT 28  --   --    AST 35  --   --    BILITOT 1.2*  --   --        Diagnostic Results:      ASSESSMENT/PLAN:         Active Hospital Problems    Diagnosis  POA    *Dissection of abdominal aorta [I71.02]  Yes    CAD (coronary artery disease) [I25.10]  Yes    Aortic mural thrombus [I74.10]  Yes    Acute pancreatitis [K85.90]  Yes    H/O splenectomy [Z90.81]  Not Applicable    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         Plan:     -IVFs, pain control, pancreas rest for suspected acute pancreatitis.  -s/p Vascular evaluation and no interventions needed for chronic appearing dissection.  -Moving out of the ICU  -Serial abdominal exams  -Advancing diet as tolerated      VTE Risk Mitigation (From admission, onward)           Ordered     IP VTE  HIGH RISK PATIENT  Once         09/06/22 1822     Place sequential compression device  Until discontinued         09/06/22 1822                      Department Hospital Medicine  [facility]  Anais Marcum MD  Date of service: 09/07/2022

## 2022-09-08 NOTE — PLAN OF CARE
[facility]  Discharge Reassessment    Primary Care Provider: Fernanda Hermosillo MD    Expected Discharge Date: 9/9/2022    Per MDRs, patient not medically ready for discharge today possible tomorrow.  Patient on clear liquid diet at this time.  CM following for discharge planning needs.    Reassessment (most recent)       Discharge Reassessment - 09/08/22 1710          Discharge Reassessment    Assessment Type Discharge Planning Reassessment     Did the patient's condition or plan change since previous assessment? Yes     Discharge Plan discussed with: Patient     Communicated IRVING with patient/caregiver Yes     Discharge Plan A Home     Discharge Plan B Home with family     DME Needed Upon Discharge  none     Discharge Barriers Identified None     Why the patient remains in the hospital Requires continued medical care

## 2022-09-09 VITALS
OXYGEN SATURATION: 95 % | DIASTOLIC BLOOD PRESSURE: 63 MMHG | SYSTOLIC BLOOD PRESSURE: 105 MMHG | HEART RATE: 60 BPM | BODY MASS INDEX: 34.42 KG/M2 | WEIGHT: 182.31 LBS | HEIGHT: 61 IN | RESPIRATION RATE: 18 BRPM | TEMPERATURE: 98 F

## 2022-09-09 LAB
GLUCOSE SERPL-MCNC: 143 MG/DL (ref 70–110)
GLUCOSE SERPL-MCNC: 144 MG/DL (ref 70–110)
LABCORP MISC TEST CODE: NORMAL
LABCORP MISC TEST NAME: NORMAL
LABCORP MISCELLANEOUS TEST: NORMAL

## 2022-09-09 PROCEDURE — 25500020 PHARM REV CODE 255: Performed by: INTERNAL MEDICINE

## 2022-09-09 PROCEDURE — 25000003 PHARM REV CODE 250

## 2022-09-09 PROCEDURE — 99900035 HC TECH TIME PER 15 MIN (STAT)

## 2022-09-09 PROCEDURE — 63600175 PHARM REV CODE 636 W HCPCS: Performed by: FAMILY MEDICINE

## 2022-09-09 PROCEDURE — 25000003 PHARM REV CODE 250: Performed by: FAMILY MEDICINE

## 2022-09-09 PROCEDURE — C9113 INJ PANTOPRAZOLE SODIUM, VIA: HCPCS | Performed by: FAMILY MEDICINE

## 2022-09-09 PROCEDURE — 25000003 PHARM REV CODE 250: Performed by: INTERNAL MEDICINE

## 2022-09-09 RX ORDER — LISINOPRIL 5 MG/1
10 TABLET ORAL DAILY
Start: 2022-09-09

## 2022-09-09 RX ORDER — HYDROCODONE BITARTRATE AND ACETAMINOPHEN 5; 325 MG/1; MG/1
1 TABLET ORAL EVERY 4 HOURS PRN
Qty: 28 TABLET | Refills: 0 | Status: SHIPPED | OUTPATIENT
Start: 2022-09-09 | End: 2022-12-01

## 2022-09-09 RX ORDER — PANTOPRAZOLE SODIUM 40 MG/1
40 TABLET, DELAYED RELEASE ORAL DAILY
Status: DISCONTINUED | OUTPATIENT
Start: 2022-09-10 | End: 2022-09-09 | Stop reason: HOSPADM

## 2022-09-09 RX ADMIN — METOPROLOL SUCCINATE 25 MG: 25 TABLET, EXTENDED RELEASE ORAL at 08:09

## 2022-09-09 RX ADMIN — HYDROCODONE BITARTRATE AND ACETAMINOPHEN 1 TABLET: 5; 325 TABLET ORAL at 10:09

## 2022-09-09 RX ADMIN — PANTOPRAZOLE SODIUM 40 MG: 40 INJECTION, POWDER, FOR SOLUTION INTRAVENOUS at 08:09

## 2022-09-09 RX ADMIN — LISINOPRIL 10 MG: 10 TABLET ORAL at 08:09

## 2022-09-09 RX ADMIN — DOCUSATE SODIUM 100 MG: 100 CAPSULE, LIQUID FILLED ORAL at 08:09

## 2022-09-09 RX ADMIN — CHLORHEXIDINE GLUCONATE 15 ML: 1.2 RINSE ORAL at 08:09

## 2022-09-09 RX ADMIN — POLYETHYLENE GLYCOL 3350 17 G: 17 POWDER, FOR SOLUTION ORAL at 08:09

## 2022-09-09 RX ADMIN — MUPIROCIN 1 G: 20 OINTMENT TOPICAL at 08:09

## 2022-09-09 RX ADMIN — IOHEXOL 100 ML: 350 INJECTION, SOLUTION INTRAVENOUS at 10:09

## 2022-09-09 NOTE — DISCHARGE SUMMARY
"[facility] Medicine  Discharge Summary      Patient Name: Aleksandra Keys  MRN: [id number]  Patient Class: IP- Inpatient  Admission Date: 9/6/2022  Hospital Length of Stay: 1 days  Discharge Date and Time: 9/9/2022  3:23 PM  Attending Physician: No att. providers found   Discharging Provider: Anais Marcum MD  Primary Care Provider: Fernanda Hermosillo MD      HPI:   No notes on file    * No surgery found *      Hospital Course:   Patient presented with abdominal pain. He had been treated outpatient for suspected diverticulitis as the etiology of his left sided abdominal pain but did not feel better after completing a course of oral abx.  In the ED, Lipase was elevated suggesting pancreatitis.  CT scan of the abdomen with normal appearing pancreas but did show, "There is fusiform dilatation of the infrarenal segment of the aorta measuring up to 2.3 cm transversely. There appears to be a small area of chronic dissection along the rightward aspect of the infrarenal aorta. There is associated plaque and mural thrombus formation."  He was admitted to the ICU for this aortic pathology for suspected pancreatitis.  Seen by Dr. Up who believes this is chronic and not contributing to current symptoms.  No vascular intervention needed.  He recommended bowel regimen as CT revealing of stool burden.  Mr. Keys' exam does suggest pancreatits with TTP and abdomen swelling. CT ruled out diverticulitis or ureteral stone. Moving out of ICU and continuing pancreatitis care with aggressive IVFs, liquid diet, pain control.  RUQ US with no gallstones.  He does not drink ETOH.  Lipid panel ordered and no significant triglyceride elevation. Review of home meds does not reveal an inciting medication. He slowly improved and tolerated diet. We ultimately decided to leave him on a full liquid diet the next four days and then he will advance to a bland diet before attempting a regular diet.  His pain continues to " radiate across his abdomen but then localize in the left abdomen. I repeated a CT scan on day of discharge to ensure a previous process had not been brewing and was now declaring itself- negative for new process.  Pain has improved daily.  He is adequately hydration himself via oral hydration.  He feels well and would like to transition home which is appropriate.  Rx for pain medication given.  Diet recommendations provided. Return precautions given.  Examined on day of discharge and alert, NAD, comfortable respirations on room air, midly tender abdomen left greater than right but soft with no rebound or guarding.         Goals of Care Treatment Preferences:  Code Status: Full Code      Consults:   Consults (From admission, onward)        Status Ordering Provider     Inpatient consult to Vascular Surgery  Once        Provider:  Shay Up MD    Completed MARNI DIXON          No new Assessment & Plan notes have been filed under this hospital service since the last note was generated.  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 Call in 1 week(s).    Specialties: Hospitalist, Internal Medicine  Contact information:  [address]  [address]  [place] [id number] [phone]              Fernanda Hermosillo MD Follow up.    Specialties: Hospitalist, Internal Medicine  Contact information:  [address]  [address]  [facility] [address]  [phone]                       Patient Instructions:      Diet full liquid   Order Comments: Full liquid diet for the next four days and then may advance to bland diet for the next four days. Afterwards, may re introduce to a regular diet by trial and error thereafafter.     Notify your health care provider if you experience any of the following:  severe uncontrolled pain     Notify your health care provider if you experience any of the following:  persistent nausea and vomiting or diarrhea     Activity as tolerated       Significant Diagnostic Studies: Labs:   CMP   Recent Labs   Lab 09/08/22  0533      K 4.3      CO2 24   *   BUN 8   CREATININE 1.0   CALCIUM 8.2*   ANIONGAP 7*    and CBC   Recent Labs   Lab 09/08/22  0533   WBC 8.56   HGB 15.8   HCT 45.6          Pending Diagnostic Studies:     Procedure Component Value Units Date/Time    Lipid panel [[id number]] Collected: 09/06/22 1650    Order Status: Sent Lab Status: In process Updated: 09/06/22 1659    Specimen: Blood          Medications:  Reconciled Home Medications:      Medication List      START taking these medications    HYDROcodone-acetaminophen 5-325 mg per tablet  Commonly known as: NORCO  Take 1 tablet by mouth every 4 (four) hours as needed for Pain.        CONTINUE taking these medications    aspirin 81 mg Tab  Take 1 tablet by mouth once daily.     atorvastatin 40 MG tablet  Commonly known as: LIPITOR  Take 1 tablet (40 mg total) by mouth every evening.     azelastine 137 mcg (0.1 %) nasal spray  Commonly known as: ASTELIN  1 spray (137 mcg total) by Nasal route 2 (two) times daily.     lisinopriL 5 MG tablet  Commonly known as: PRINIVIL,ZESTRIL  Take 2 tablets (10 mg total) by mouth once daily.     metFORMIN 500 MG ER 24hr tablet  Commonly known as:  GLUCOPHAGE-XR  TAKE 2 TABLETS BY MOUTH IN THE MORNING AND 1 TABLET BY MOUTH IN THE EVENING     metoprolol succinate 25 MG 24 hr tablet  Commonly known as: TOPROL-XL  Take 1 tablet (25 mg total) by mouth once daily.     montelukast 10 mg tablet  Commonly known as: SINGULAIR  Take 10 mg by mouth every evening.        STOP taking these medications    ciprofloxacin HCl 500 MG tablet  Commonly known as: CIPRO     fluticasone propionate 50 mcg/actuation nasal spray  Commonly known as: FLONASE     metroNIDAZOLE 500 MG tablet  Commonly known as: FLAGYL     prednisoLONE acetate 1 % Drps  Commonly known as: PRED FORTE     RYBELSUS 7 mg tablet  Generic drug: semaglutide            Indwelling Lines/Drains at time of discharge:   Lines/Drains/Airways     None                 Time spent on the discharge of patient: 40 minutes         Anais Marcum MD  Department of Hospital Medicine  [facility]

## 2022-09-09 NOTE — HOSPITAL COURSE
"Patient presented with abdominal pain. He had been treated outpatient for suspected diverticulitis as the etiology of his left sided abdominal pain but did not feel better after completing a course of oral abx.  In the ED, Lipase was elevated suggesting pancreatitis.  CT scan of the abdomen with normal appearing pancreas but did show, "There is fusiform dilatation of the infrarenal segment of the aorta measuring up to 2.3 cm transversely. There appears to be a small area of chronic dissection along the rightward aspect of the infrarenal aorta. There is associated plaque and mural thrombus formation."  He was admitted to the ICU for this aortic pathology for suspected pancreatitis.  Seen by Dr. Up who believes this is chronic and not contributing to current symptoms.  No vascular intervention needed.  He recommended bowel regimen as CT revealing of stool burden.  Mr. Keys' exam does suggest pancreatits with TTP and abdomen swelling. CT ruled out diverticulitis or ureteral stone. Moving out of ICU and continuing pancreatitis care with aggressive IVFs, liquid diet, pain control.  RUQ US with no gallstones.  He does not drink ETOH.  Lipid panel ordered and no significant triglyceride elevation. Review of home meds does not reveal an inciting medication. He slowly improved and tolerated diet. We ultimately decided to leave him on a full liquid diet the next four days and then he will advance to a bland diet before attempting a regular diet.  His pain continues to radiate across his abdomen but then localize in the left abdomen. I repeated a CT scan on day of discharge to ensure a previous process had not been brewing and was now declaring itself- negative for new process.  Pain has improved daily.  He is adequately hydration himself via oral hydration.  He feels well and would like to transition home which is appropriate.  Rx for pain medication given.  Diet recommendations provided. Return precautions given.  " Examined on day of discharge and alert, NAD, comfortable respirations on room air, midly tender abdomen left greater than right but soft with no rebound or guarding.

## 2022-09-09 NOTE — PLAN OF CARE
Per assessment note, patient wishes to make his own hospital follow up appointment.     Discharge orders and chart reviewed with no further post-acute discharge needs identified at this time.  At this time, patient is cleared for discharge from Case Management standpoint.        09/09/22 1509   Final Note   Assessment Type Final Discharge Note   Anticipated Discharge Disposition Home   Post-Acute Status   Post-Acute Authorization Other   Other Status No Post-Acute Service Needs   Discharge Delays None known at this time

## 2022-09-09 NOTE — PROGRESS NOTES
Pharmacist Intervention IV to PO Note    Aleksandra Keys is a 66 y.o. male being treated with IV medication Pantoprazole    Patient Data:    Vital Signs (Most Recent):  Temp: 97.8 °F (36.6 °C) (09/09/22 0800)  Pulse: 62 (09/09/22 0800)  Resp: 18 (09/09/22 0800)  BP: 107/72 (09/09/22 0800)  SpO2: (!) 94 % (09/09/22 0800)   Vital Signs (72h Range):  Temp:  [97.7 °F (36.5 °C)-99 °F (37.2 °C)]   Pulse:  [52-82]   Resp:  [13-30]   BP: ()/(51-81)   SpO2:  [91 %-98 %]      CBC:  Recent Labs   Lab 09/06/22  1230 09/07/22  0342 09/08/22  0533   WBC 8.30 9.56 8.56   RBC 5.23 4.41* 4.64   HGB 17.9 15.2 15.8   HCT 51.3 43.3 45.6    215 180   MCV 98 98 98   MCH 34.2* 34.5* 34.1*   MCHC 34.9 35.1 34.6     CMP:     Recent Labs   Lab 09/06/22  1230 09/07/22  0342 09/07/22  1140 09/08/22  0533   * 138*  --  147*   CALCIUM 9.5 7.8*  --  8.2*   ALBUMIN 4.3  --   --   --    PROT 7.4  --   --   --     138  --  140   K 5.0 3.6 4.1 4.3   CO2 26 25  --  24    108  --  109   BUN 14 12  --  8   CREATININE 1.0 0.9  --  1.0   ALKPHOS 61  --   --   --    ALT 28  --   --   --    AST 35  --   --   --    BILITOT 1.2*  --   --   --        Dietary Orders:  Diet Orders            Diet Fentress: Fentress starting at 09/09 0814            Based on the following criteria, this patient qualifies for intravenous to oral conversion:  [x] The patients gastrointestinal tract is functioning (tolerating medications via oral or enteral route for 24 hours and tolerating food or enteral feeds for 24 hours).    Pantoprazole 40 mg IV Daily will be changed to Pantoprazole 40 mg PO Daily    Pharmacist's Name: Del Arambula  Pharmacist's Extension: [phone]

## 2022-09-11 LAB
BACTERIA BLD CULT: NORMAL
BACTERIA BLD CULT: NORMAL

## 2023-04-11 ENCOUNTER — LAB VISIT (OUTPATIENT)
Dept: LAB | Facility: HOSPITAL | Age: 68
End: 2023-04-11
Attending: NURSE PRACTITIONER
Payer: MEDICARE

## 2023-04-11 DIAGNOSIS — E11.9 DIABETES MELLITUS WITHOUT COMPLICATION: ICD-10-CM

## 2023-04-11 DIAGNOSIS — I10 ESSENTIAL HYPERTENSION, MALIGNANT: Primary | ICD-10-CM

## 2023-04-11 LAB
ALBUMIN SERPL BCP-MCNC: 3.9 G/DL (ref 3.5–5.2)
ALP SERPL-CCNC: 95 U/L (ref 55–135)
ALT SERPL W/O P-5'-P-CCNC: 29 U/L (ref 10–44)
ANION GAP SERPL CALC-SCNC: 9 MMOL/L (ref 8–16)
AST SERPL-CCNC: 18 U/L (ref 10–40)
BASOPHILS # BLD AUTO: 0.08 K/UL (ref 0–0.2)
BASOPHILS NFR BLD: 1.1 % (ref 0–1.9)
BILIRUB SERPL-MCNC: 1.1 MG/DL (ref 0.1–1)
BUN SERPL-MCNC: 13 MG/DL (ref 8–23)
CALCIUM SERPL-MCNC: 8.7 MG/DL (ref 8.7–10.5)
CHLORIDE SERPL-SCNC: 104 MMOL/L (ref 95–110)
CHOLEST SERPL-MCNC: 157 MG/DL (ref 120–199)
CHOLEST/HDLC SERPL: 4.8 {RATIO} (ref 2–5)
CO2 SERPL-SCNC: 23 MMOL/L (ref 23–29)
CREAT SERPL-MCNC: 1.1 MG/DL (ref 0.5–1.4)
DIFFERENTIAL METHOD: ABNORMAL
EOSINOPHIL # BLD AUTO: 0.3 K/UL (ref 0–0.5)
EOSINOPHIL NFR BLD: 4.5 % (ref 0–8)
ERYTHROCYTE [DISTWIDTH] IN BLOOD BY AUTOMATED COUNT: 12 % (ref 11.5–14.5)
EST. GFR  (NO RACE VARIABLE): >60 ML/MIN/1.73 M^2
ESTIMATED AVG GLUCOSE: 189 MG/DL (ref 68–131)
GLUCOSE SERPL-MCNC: 268 MG/DL (ref 70–110)
HBA1C MFR BLD: 8.2 % (ref 4–5.6)
HCT VFR BLD AUTO: 48.9 % (ref 40–54)
HDLC SERPL-MCNC: 33 MG/DL (ref 40–75)
HDLC SERPL: 21 % (ref 20–50)
HGB BLD-MCNC: 17.2 G/DL (ref 14–18)
IMM GRANULOCYTES # BLD AUTO: 0.04 K/UL (ref 0–0.04)
IMM GRANULOCYTES NFR BLD AUTO: 0.5 % (ref 0–0.5)
LDLC SERPL CALC-MCNC: 59.2 MG/DL (ref 63–159)
LYMPHOCYTES # BLD AUTO: 2.7 K/UL (ref 1–4.8)
LYMPHOCYTES NFR BLD: 36.8 % (ref 18–48)
MAGNESIUM SERPL-MCNC: 1.8 MG/DL (ref 1.6–2.6)
MCH RBC QN AUTO: 33.9 PG (ref 27–31)
MCHC RBC AUTO-ENTMCNC: 35.2 G/DL (ref 32–36)
MCV RBC AUTO: 96 FL (ref 82–98)
MONOCYTES # BLD AUTO: 0.6 K/UL (ref 0.3–1)
MONOCYTES NFR BLD: 8.1 % (ref 4–15)
NEUTROPHILS # BLD AUTO: 3.6 K/UL (ref 1.8–7.7)
NEUTROPHILS NFR BLD: 49 % (ref 38–73)
NONHDLC SERPL-MCNC: 124 MG/DL
NRBC BLD-RTO: 0 /100 WBC
PLATELET # BLD AUTO: 184 K/UL (ref 150–450)
PMV BLD AUTO: 10.4 FL (ref 9.2–12.9)
POTASSIUM SERPL-SCNC: 4 MMOL/L (ref 3.5–5.1)
PROT SERPL-MCNC: 6.9 G/DL (ref 6–8.4)
RBC # BLD AUTO: 5.08 M/UL (ref 4.6–6.2)
SODIUM SERPL-SCNC: 136 MMOL/L (ref 136–145)
T4 FREE SERPL-MCNC: 0.95 NG/DL (ref 0.71–1.51)
TRIGL SERPL-MCNC: 324 MG/DL (ref 30–150)
TSH SERPL DL<=0.005 MIU/L-ACNC: 1.33 UIU/ML (ref 0.4–4)
WBC # BLD AUTO: 7.41 K/UL (ref 3.9–12.7)

## 2023-04-11 PROCEDURE — 85025 COMPLETE CBC W/AUTO DIFF WBC: CPT | Performed by: NURSE PRACTITIONER

## 2023-04-11 PROCEDURE — 83735 ASSAY OF MAGNESIUM: CPT | Performed by: NURSE PRACTITIONER

## 2023-04-11 PROCEDURE — 80053 COMPREHEN METABOLIC PANEL: CPT | Performed by: NURSE PRACTITIONER

## 2023-04-11 PROCEDURE — 84443 ASSAY THYROID STIM HORMONE: CPT | Performed by: NURSE PRACTITIONER

## 2023-04-11 PROCEDURE — 84439 ASSAY OF FREE THYROXINE: CPT | Performed by: NURSE PRACTITIONER

## 2023-04-11 PROCEDURE — 80061 LIPID PANEL: CPT | Performed by: NURSE PRACTITIONER

## 2023-04-11 PROCEDURE — 83036 HEMOGLOBIN GLYCOSYLATED A1C: CPT | Performed by: NURSE PRACTITIONER

## 2023-04-11 PROCEDURE — 36415 COLL VENOUS BLD VENIPUNCTURE: CPT | Performed by: NURSE PRACTITIONER

## 2023-10-11 ENCOUNTER — PATIENT MESSAGE (OUTPATIENT)
Dept: FAMILY MEDICINE | Facility: CLINIC | Age: 68
End: 2023-10-11

## 2024-02-14 ENCOUNTER — TELEPHONE (OUTPATIENT)
Dept: DERMATOLOGY | Facility: CLINIC | Age: 69
End: 2024-02-14
Payer: MEDICARE

## 2024-02-14 NOTE — TELEPHONE ENCOUNTER
----- Message from Jacquie Godinez sent at 2/14/2024 11:23 AM CST -----  Regarding: sooner appt  Contact: pt  Type:  Sooner Apoointment Request    Caller is requesting a sooner appointment.  Caller declined first available appointment listed below.  Caller will not accept being placed on the waitlist and is requesting a message be sent to doctor.  Name of Caller:pt  When is the first available appointment?11/22/24  Symptoms:spots on back and legs  Would the patient rather a call back or a response via [facility]? Call back  Best Call Back Number:[phone]    Additional Information: sts he would like to get a sooner appt

## 2024-04-15 ENCOUNTER — OFFICE VISIT (OUTPATIENT)
Dept: DERMATOLOGY | Facility: CLINIC | Age: 69
End: 2024-04-15
Payer: MEDICARE

## 2024-04-15 VITALS — BODY MASS INDEX: 35.79 KG/M2 | WEIGHT: 182.31 LBS | HEIGHT: 60 IN

## 2024-04-15 DIAGNOSIS — L82.1 SEBORRHEIC KERATOSES: ICD-10-CM

## 2024-04-15 DIAGNOSIS — L81.4 SOLAR LENTIGO: ICD-10-CM

## 2024-04-15 DIAGNOSIS — L29.9 PRURITUS: ICD-10-CM

## 2024-04-15 DIAGNOSIS — L57.8 ACTINIC SKIN DAMAGE: ICD-10-CM

## 2024-04-15 DIAGNOSIS — Z12.83 SKIN CANCER SCREENING: ICD-10-CM

## 2024-04-15 DIAGNOSIS — L57.0 ACTINIC KERATOSES: Primary | ICD-10-CM

## 2024-04-15 DIAGNOSIS — L82.0 INFLAMED SEBORRHEIC KERATOSIS: ICD-10-CM

## 2024-04-15 PROCEDURE — 99203 OFFICE O/P NEW LOW 30 MIN: CPT | Mod: 25,AQ,S$GLB, | Performed by: DERMATOLOGY

## 2024-04-15 PROCEDURE — 17000 DESTRUCT PREMALG LESION: CPT | Mod: AQ,XS,S$GLB, | Performed by: DERMATOLOGY

## 2024-04-15 PROCEDURE — 17110 DESTRUCTION B9 LES UP TO 14: CPT | Mod: AQ,S$GLB,, | Performed by: DERMATOLOGY

## 2024-04-15 PROCEDURE — 17003 DESTRUCT PREMALG LES 2-14: CPT | Mod: AQ,XS,S$GLB, | Performed by: DERMATOLOGY

## 2024-04-15 RX ORDER — METOPROLOL SUCCINATE 25 MG/1
1 TABLET, EXTENDED RELEASE ORAL DAILY
COMMUNITY
Start: 2024-03-28

## 2024-04-15 RX ORDER — METFORMIN HYDROCHLORIDE 1000 MG/1
1000 TABLET ORAL 2 TIMES DAILY
COMMUNITY

## 2024-04-15 RX ORDER — EMPAGLIFLOZIN 25 MG/1
25 TABLET, FILM COATED ORAL
COMMUNITY

## 2024-04-15 NOTE — PROGRESS NOTES
Subjective:      Patient ID:  Aleksandra Keys is a 68 y.o. male who presents for   Chief Complaint   Patient presents with    Skin Check     TBSC      New Patient    Patient here today for TBSC  C/O spots to chest, no symptoms.    Derm Hx:  Denies Phx of NMSC  Denies Fhx of MM    Current Outpatient Medications:   ·  aspirin (ECOTRIN) 81 MG EC tablet, Take 81 mg by mouth., Disp: , Rfl:   ·  atorvastatin (LIPITOR) 40 MG tablet, Take 1 tablet (40 mg total) by mouth every evening., Disp: 90 tablet, Rfl: 3  ·  atorvastatin (LIPITOR) 40 MG tablet, Take 40 mg by mouth once daily., Disp: , Rfl:   ·  fluticasone propionate (FLONASE) 50 mcg/actuation nasal spray, , Disp: , Rfl:   ·  ipratropium (ATROVENT) 42 mcg (0.06 %) nasal spray, USE 2 SPRAY(S) IN EACH NOSTRIL THREE TIMES DAILY AS NEEDED FOR UP TO 30 DAYS, Disp: , Rfl:   ·  lisinopriL 10 MG tablet, Take 10 mg by mouth., Disp: , Rfl:   ·  montelukast (SINGULAIR) 10 mg tablet, Take 10 mg by mouth every evening., Disp: , Rfl:   ·  aspirin 81 mg Tab, Take 1 tablet by mouth once daily. , Disp: , Rfl:   ·  lisinopriL (PRINIVIL,ZESTRIL) 2.5 MG tablet, Take 2.5 mg by mouth., Disp: , Rfl:   ·  lisinopriL (PRINIVIL,ZESTRIL) 5 MG tablet, Take 2 tablets (10 mg total) by mouth once daily., Disp: , Rfl:           Review of Systems   Constitutional:  Negative for fever, chills and fatigue.   Skin:  Negative for daily sunscreen use, activity-related sunscreen use and wears hat.       Objective:   Physical Exam   Constitutional: He appears well-developed and well-nourished. No distress.   Neurological: He is alert and oriented to person, place, and time. He is not disoriented.   Psychiatric: He has a normal mood and affect.   Skin:   Areas Examined (abnormalities noted in diagram):   Scalp / Hair Palpated and Inspected  Head / Face Inspection Performed  Neck Inspection Performed  Chest / Axilla Inspection Performed  Abdomen Inspection Performed  Genitals / Buttocks / Groin Inspection  Performed  Back Inspection Performed  RUE Inspected  LUE Inspection Performed  RLE Inspected  LLE Inspection Performed  Nails and Digits Inspection Performed                     Diagram Legend     Erythematous scaling macule/papule c/w actinic keratosis       Vascular papule c/w angioma      Pigmented verrucoid papule/plaque c/w seborrheic keratosis      Yellow umbilicated papule c/w sebaceous hyperplasia      Irregularly shaped tan macule c/w lentigo     1-2 mm smooth white papules consistent with Milia      Movable subcutaneous cyst with punctum c/w epidermal inclusion cyst      Subcutaneous movable cyst c/w pilar cyst      Firm pink to brown papule c/w dermatofibroma      Pedunculated fleshy papule(s) c/w skin tag(s)      Evenly pigmented macule c/w junctional nevus     Mildly variegated pigmented, slightly irregular-bordered macule c/w mildly atypical nevus      Flesh colored to evenly pigmented papule c/w intradermal nevus       Pink pearly papule/plaque c/w basal cell carcinoma      Erythematous hyperkeratotic cursted plaque c/w SCC      Surgical scar with no sign of skin cancer recurrence      Open and closed comedones      Inflammatory papules and pustules      Verrucoid papule consistent consistent with wart     Erythematous eczematous patches and plaques     Dystrophic onycholytic nail with subungual debris c/w onychomycosis     Umbilicated papule    Erythematous-base heme-crusted tan verrucoid plaque consistent with inflamed seborrheic keratosis     Erythematous Silvery Scaling Plaque c/w Psoriasis     See annotation      Assessment / Plan:        Aleksandra was seen today for skin check.    Diagnoses and all orders for this visit:    Actinic keratoses  Cryosurgery Procedure Note    Verbal consent from the patient is obtained and the patient is aware of the precancerous quality and need for treatment of these lesions. Liquid nitrogen cryosurgery is applied to the 4 actinic keratoses, as detailed in the  physical exam, to produce a freeze injury. The patient is aware that blisters may form and is instructed on wound care with gentle cleansing and use of vaseline ointment to keep moist until healed. The patient is supplied a handout on cryosurgery and is instructed to call if lesions do not completely resolve.    Inflamed seborrheic keratosis  Pruritus  Cryosurgery procedure note:    Verbal consent from the patient is obtained. Liquid nitrogen cryosurgery is applied to 2 lesions to produce a freeze injury. The patient is aware that blisters may form and is instructed on wound care with gentle cleansing and use of vaseline ointment to keep moist until healed. The patient is supplied a handout on cryosurgery and is instructed to call if lesions do not completely resolve.    Seborrheic keratoses  These are benign inherited growths without a malignant potential. Reassurance given to patient. No treatment is necessary.     Solar lentigo  This is a benign hyperpigmented sun induced lesion. Daily sun protection will reduce the number of new lesions. Treatment of these benign lesions are considered cosmetic.    Actinic skin damage  Patient instructed in importance in daily broad spectrum sun protection of at least spf 30. Mineral sunscreen ingredients preferred (Zinc +/- Titanium) and can be found OTC.   Recommend Elta MD for daily use on face and neck.  Patient encouraged to wear hat for all outdoor exposure.   Also discussed sun avoidance and use of protective clothing.    Skin cancer screening  Total body skin examination performed today including at least 12 points as noted in physical examination. No lesions suspicious for malignancy noted.             Follow up in about 6 months (around 10/15/2024).

## 2024-04-15 NOTE — PATIENT INSTRUCTIONS

## 2024-07-02 ENCOUNTER — HOSPITAL ENCOUNTER (EMERGENCY)
Facility: HOSPITAL | Age: 69
Discharge: HOME | End: 2024-07-02
Attending: EMERGENCY MEDICINE
Payer: MEDICARE

## 2024-07-02 VITALS
SYSTOLIC BLOOD PRESSURE: 108 MMHG | OXYGEN SATURATION: 95 % | HEART RATE: 58 BPM | RESPIRATION RATE: 17 BRPM | TEMPERATURE: 98 F | DIASTOLIC BLOOD PRESSURE: 62 MMHG | BODY MASS INDEX: 33.01 KG/M2 | WEIGHT: 169 LBS

## 2024-07-02 DIAGNOSIS — M62.838 NECK MUSCLE SPASM: ICD-10-CM

## 2024-07-02 DIAGNOSIS — M54.12 CERVICAL RADICULOPATHY: Primary | ICD-10-CM

## 2024-07-02 DIAGNOSIS — M25.519 SHOULDER PAIN: ICD-10-CM

## 2024-07-02 LAB
OHS QRS DURATION: 92 MS
OHS QTC CALCULATION: 454 MS

## 2024-07-02 PROCEDURE — 93010 ELECTROCARDIOGRAM REPORT: CPT | Mod: ,,, | Performed by: GENERAL PRACTICE

## 2024-07-02 PROCEDURE — 99284 EMERGENCY DEPT VISIT MOD MDM: CPT | Mod: 25

## 2024-07-02 PROCEDURE — 93005 ELECTROCARDIOGRAM TRACING: CPT | Performed by: GENERAL PRACTICE

## 2024-07-02 PROCEDURE — 25000003 PHARM REV CODE 250

## 2024-07-02 RX ORDER — METHOCARBAMOL 500 MG/1
500 TABLET, FILM COATED ORAL
Status: COMPLETED | OUTPATIENT
Start: 2024-07-02 | End: 2024-07-02

## 2024-07-02 RX ORDER — IBUPROFEN 800 MG/1
800 TABLET ORAL EVERY 6 HOURS PRN
Qty: 20 TABLET | Refills: 0 | Status: SHIPPED | OUTPATIENT
Start: 2024-07-02 | End: 2024-07-12

## 2024-07-02 RX ORDER — METHOCARBAMOL 750 MG/1
750 TABLET, FILM COATED ORAL 3 TIMES DAILY
Qty: 15 TABLET | Refills: 0 | Status: SHIPPED | OUTPATIENT
Start: 2024-07-02 | End: 2024-07-07

## 2024-07-02 RX ADMIN — IBUPROFEN 600 MG: 200 TABLET, FILM COATED ORAL at 05:07

## 2024-07-02 RX ADMIN — METHOCARBAMOL 500 MG: 500 TABLET ORAL at 05:07

## 2024-07-02 NOTE — ED PROVIDER NOTES
"Encounter Date: 7/2/2024       History     Chief Complaint   Patient presents with    Shoulder Pain     Injured lt shoulder a couple weeks ago. Pain worsening and unable to sleep. No new trauma. Intermittent "tingling".      HPI    69yo M with PMHx of HTN, HLD, CAD s/p CABG, presenting to the ED with cc of L shoulder pain. 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 It is worse with extremes of motion at the shoulder, and has also been waking him from sleep at night over the past several weeks. Its associated with a tingling sensation as if his arm is "falling asleep." It improves when he pumps his fist and raises his arm up. He denies CP, nausea, vomiting, SOB. He is concerned about a potential blood clot but no swelling in the arm.       Review of patient's allergies indicates:   Allergen Reactions    Dapagliflozin Other (See Comments)     myalgia    Niacin      Past Medical History:   Diagnosis Date    AK (actinic keratosis)     Anxiety     Benign hypertension     Diabetes mellitus, type 2     Diverticulitis     Fatty liver     GERD (gastroesophageal reflux disease)     Hyperlipidemia     ITP (idiopathic thrombocytopenic purpura)     Occlusive coronary artery disease      Past Surgical History:   Procedure Laterality Date    COLONOSCOPY  04/11/2016    Dr. Abbott, multiple polyps, recheck 6 month    CORONARY ARTERY BYPASS GRAFT      EYE SURGERY      SPLENECTOMY, TOTAL       Family History   Problem Relation Name Age of Onset    Diabetes Mother      Hypertension Mother      Skin cancer Mother      Diabetes Brother      Heart disease Brother      Hyperlipidemia Father      Hypertension Father      Diabetes Maternal Aunt      Heart disease Maternal Uncle      Cancer Paternal Aunt          thyroid    Heart disease Paternal Uncle      Cancer Paternal Uncle          lung    Diabetes Maternal Grandmother      Heart disease Maternal Grandfather      Heart " disease Paternal Grandfather      No Known Problems Son      Melanoma Neg Hx      Psoriasis Neg Hx      Lupus Neg Hx      Eczema Neg Hx       Social History     Tobacco Use    Smoking status: Former     Current packs/day: 0.00     Types: Cigarettes     Quit date: 10/9/2010     Years since quittin.7    Smokeless tobacco: Never   Substance Use Topics    Alcohol use: Yes    Drug use: No     Review of Systems  See HPI   Physical Exam     Initial Vitals [24 0425]   BP Pulse Resp Temp SpO2   118/85 65 17 98.3 °F (36.8 °C) 96 %      MAP       --         Physical Exam    Constitutional: He appears well-developed and well-nourished. He is not diaphoretic. No distress.   Neck: Neck supple.   No C spine TTP   Normal range of motion.  Cardiovascular:  Normal rate, regular rhythm and normal heart sounds.           Pulmonary/Chest: Breath sounds normal. No respiratory distress. He has no wheezes. He has no rhonchi. He has no rales.   Musculoskeletal:      Cervical back: Normal range of motion and neck supple.      Comments: TTP over the left trapezius muscle. No bony step off or deformities noted. Pain with abduction of the shoulder against resistance. No pain with empty can test.      Neurological: He is alert and oriented to person, place, and time. He has normal strength. No sensory deficit.   Skin: Skin is warm and dry. Capillary refill takes less than 2 seconds. No erythema.   Psychiatric: He has a normal mood and affect. Thought content normal.         ED Course   Procedures  Labs Reviewed - No data to display  EKG Readings: (Independently Interpreted)   Normal sinus rhythm rate 62 rightward axis no ischemic changes     ECG Results              EKG 12-lead (In process)        Collection Time Result Time QRS Duration OHS QTC Calculation    24 05:24:32 24 05:37:00 92 454                     In process by Interface, Lab In [facility] (24 05:37:05)                   Narrative:    Test Reason :  M25.519,    Vent. Rate : 062 BPM     Atrial Rate : 062 BPM     P-R Int : 158 ms          QRS Dur : 092 ms      QT Int : 448 ms       P-R-T Axes : 049 109 066 degrees     QTc Int : 454 ms    Normal sinus rhythm  Rightward axis  Borderline Abnormal ECG  When compared with ECG of 06-SEP-2022 13:08,  Premature ventricular complexes are no longer Present    Referred By: [email or web address]   SELF           Confirmed By:                                   Imaging Results              X-Ray Shoulder 2 or More Views Left (In process)                      Medications   methocarbamoL tablet 500 mg (500 mg Oral Given 7/2/24 0524)   ibuprofen tablet 600 mg (600 mg Oral Given 7/2/24 0524)     Medical Decision Making  69yo M with PMHx of HTN, HLD, CAD s/p CABG, presenting to the ED with cc of L shoulder pain which has been intermittent over the past several months. Worse at night, waking him from sleep with tingling sensation. Reproducible with movements in extremes of shoulder ROM. 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     VSS, PE as detailed above. TTP along L trapezius. Pain reproduced with abduction of L arm against resistance.     Ddx: rotator cuff injury, AC joint separation, shoulder impingement syndrome, radiculopathy. Also concern for atypical ACS given his history.     Will obtain EKG, X Ray of L shoulder, give ibuprofen and robaxin. Will reassess.     5:45AM - Xray without obvious acute traumatic injury. He reports improvements in symptoms with pain meds. Overall, presentation most consistent with cervical radiculopathy vs rotator cuff injury. Will discharge with NSAIDs, muscle relaxants, and ortho referral. Return precautions and follow up instructions discussed.     Case discussed with Dr. Stanislav Negron  [facility] EM PGY3       Attending Note:  I provided a face to face evaluation of this patient.  I discussed the patient's care with the Resident.  I reviewed their note and agree with the  history, physical, assessment, diagnosis, treatment, all procedures performed, xray and EKG interpretations and discharge plan provided by the Resident. My overall impression is left-sided shoulder pain, cervical radiculopathy muscle spasms of left shoulder and neck.  The patient has been instructed to follow up with their physician or the one provided as well as specific return precautions.   Alcira Colorado M.D. 7/2/2024 5:52 AM        Amount and/or Complexity of Data Reviewed  Independent Historian: spouse  Radiology: ordered.  ECG/medicine tests: ordered.     Details: EKG independently interpreted: NSR rate 65. RAD. Normal intervals. No ST elevation/depression. No STEMI or dangerous arrhythmia.     Risk  Prescription drug management.                                      Clinical Impression:  Final diagnoses:  [M25.519] Shoulder pain  [M54.12] Cervical radiculopathy (Primary)  [M62.838] Neck muscle spasm          ED Disposition Condition    Discharge Stable          ED Prescriptions       Medication Sig Dispense Start Date End Date Auth. Provider    methocarbamoL (ROBAXIN) 750 MG Tab Take 1 tablet (750 mg total) by mouth 3 (three) times daily. for 5 days 15 tablet 7/2/2024 7/7/2024 Alcira Colorado MD    ibuprofen (ADVIL,MOTRIN) 800 MG tablet Take 1 tablet (800 mg total) by mouth every 6 (six) hours as needed for Pain. 20 tablet 7/2/2024 7/12/2024 Alcira Colorado MD          Follow-up Information       Follow up With Specialties Details Why Contact Info Additional Information    Richard Chavira MD Orthopedic Surgery, Surgery, Sports Medicine Schedule an appointment as soon as possible for a visit  If your symptoms do not improve [address]  [address]  [facility] [address]  [phone]       [facility] - Emergency Dept Emergency Medicine Go to  If symptoms worsen [address] [address] [phone] 1st floor    Itz Cordova MD Neurosurgery Schedule an appointment  as soon as possible for a visit  If orthopedics determined that this is related to the cervical spine [address]  [facility] [address]  [phone]                Isauro Negron MD  Resident  07/02/24 0602

## 2024-10-18 ENCOUNTER — TELEPHONE (OUTPATIENT)
Dept: DERMATOLOGY | Facility: CLINIC | Age: 69
End: 2024-10-18
Payer: MEDICARE

## 2024-10-18 NOTE — TELEPHONE ENCOUNTER
Called and left vm for patient to return call to get scheduled    ----- Message from Nohemi sent at 10/18/2024  2:27 PM CDT -----  Type:  Patient Returning Call    Who Called pt   Who Left Message for Patient:pt   Does the patient know what this is regarding?:pt was calling to say her want make Monday appt he is out of town   Would the patient rather a call back or a response via [facility]? call  Best Call Back Number:[phone]  Additional Information: call

## 2024-10-29 DIAGNOSIS — K57.90 DIVERTICULOSIS: Primary | ICD-10-CM

## 2024-10-29 DIAGNOSIS — R10.32 ABDOMINAL PAIN, LEFT LOWER QUADRANT: ICD-10-CM

## 2024-10-29 DIAGNOSIS — N20.0 URIC ACID NEPHROLITHIASIS: ICD-10-CM

## 2024-10-31 ENCOUNTER — TELEPHONE (OUTPATIENT)
Dept: RADIOLOGY | Facility: HOSPITAL | Age: 69
End: 2024-10-31

## 2024-11-01 ENCOUNTER — HOSPITAL ENCOUNTER (OUTPATIENT)
Dept: RADIOLOGY | Facility: HOSPITAL | Age: 69
Discharge: HOME | End: 2024-11-01
Attending: INTERNAL MEDICINE
Payer: MEDICARE

## 2024-11-01 DIAGNOSIS — R10.32 ABDOMINAL PAIN, LEFT LOWER QUADRANT: ICD-10-CM

## 2024-11-01 DIAGNOSIS — K57.90 DIVERTICULOSIS: ICD-10-CM

## 2024-11-01 DIAGNOSIS — N20.0 URIC ACID NEPHROLITHIASIS: ICD-10-CM

## 2024-11-01 PROCEDURE — 25500020 PHARM REV CODE 255

## 2024-11-01 PROCEDURE — 74177 CT ABD & PELVIS W/CONTRAST: CPT | Mod: 26,,, | Performed by: RADIOLOGY

## 2024-11-01 PROCEDURE — 74177 CT ABD & PELVIS W/CONTRAST: CPT | Mod: TC

## 2024-11-01 RX ADMIN — IOHEXOL 30 ML: 300 INJECTION, SOLUTION INTRAVENOUS at 08:11

## 2024-11-01 RX ADMIN — IOHEXOL 75 ML: 350 INJECTION, SOLUTION INTRAVENOUS at 08:11

## 2025-04-01 ENCOUNTER — HOSPITAL ENCOUNTER (INPATIENT)
Facility: HOSPITAL | Age: 70
LOS: 2 days | Discharge: HOME | DRG: 661 | End: 2025-04-03
Attending: STUDENT IN AN ORGANIZED HEALTH CARE EDUCATION/TRAINING PROGRAM | Admitting: HOSPITALIST
Payer: MEDICARE

## 2025-04-01 ENCOUNTER — ANESTHESIA (OUTPATIENT)
Dept: SURGERY | Facility: HOSPITAL | Age: 70
DRG: 661 | End: 2025-04-01
Payer: MEDICARE

## 2025-04-01 ENCOUNTER — ANESTHESIA EVENT (OUTPATIENT)
Dept: SURGERY | Facility: HOSPITAL | Age: 70
DRG: 661 | End: 2025-04-01
Payer: MEDICARE

## 2025-04-01 DIAGNOSIS — N20.1 URETEROLITHIASIS: Primary | ICD-10-CM

## 2025-04-01 DIAGNOSIS — R07.9 CHEST PAIN: ICD-10-CM

## 2025-04-01 DIAGNOSIS — Z13.6 SCREENING FOR CARDIOVASCULAR CONDITION: ICD-10-CM

## 2025-04-01 DIAGNOSIS — R10.9 FLANK PAIN: ICD-10-CM

## 2025-04-01 PROBLEM — N39.0 UTI (URINARY TRACT INFECTION): Status: ACTIVE | Noted: 2025-04-01

## 2025-04-01 LAB
ABSOLUTE EOSINOPHIL (SMH): 0.27 K/UL
ABSOLUTE MONOCYTE (SMH): 1.14 K/UL (ref 0.3–1)
ABSOLUTE NEUTROPHIL COUNT (SMH): 4.5 K/UL (ref 1.8–7.7)
ALBUMIN SERPL-MCNC: 4.3 G/DL (ref 3.5–5.2)
ALP SERPL-CCNC: 72 UNIT/L (ref 55–135)
ALT SERPL-CCNC: 27 UNIT/L (ref 10–44)
ANION GAP (SMH): 8 MMOL/L (ref 8–16)
AST SERPL-CCNC: 22 UNIT/L (ref 10–40)
BASOPHILS # BLD AUTO: 0.07 K/UL
BASOPHILS NFR BLD AUTO: 0.7 %
BILIRUB SERPL-MCNC: 1.1 MG/DL (ref 0.1–1)
BILIRUB UR QL STRIP.AUTO: NEGATIVE
BUN SERPL-MCNC: 19 MG/DL (ref 8–23)
CALCIUM SERPL-MCNC: 9.1 MG/DL (ref 8.7–10.5)
CHLORIDE SERPL-SCNC: 101 MMOL/L (ref 95–110)
CLARITY UR: ABNORMAL
CO2 SERPL-SCNC: 27 MMOL/L (ref 23–29)
COLOR UR AUTO: ABNORMAL
CREAT SERPL-MCNC: 1.1 MG/DL (ref 0.5–1.4)
ERYTHROCYTE [DISTWIDTH] IN BLOOD BY AUTOMATED COUNT: 11.8 % (ref 11.5–14.5)
GFR SERPLBLD CREATININE-BSD FMLA CKD-EPI: >60 ML/MIN/1.73/M2
GLUCOSE SERPL-MCNC: 195 MG/DL (ref 70–110)
GLUCOSE UR QL STRIP: ABNORMAL
HCT VFR BLD AUTO: 50.1 % (ref 40–54)
HCV AB SERPL QL IA: NORMAL
HGB BLD-MCNC: 17.6 GM/DL (ref 14–18)
HGB UR QL STRIP: ABNORMAL
HIV 1+2 AB+HIV1 P24 AG SERPL QL IA: NORMAL
IMM GRANULOCYTES # BLD AUTO: 0.03 K/UL (ref 0–0.04)
IMM GRANULOCYTES NFR BLD AUTO: 0.3 % (ref 0–0.5)
KETONES UR QL STRIP: NEGATIVE
LDH SERPL L TO P-CCNC: 1.42 MMOL/L (ref 0.5–2.2)
LEUKOCYTE ESTERASE UR QL STRIP: NEGATIVE
LIPASE SERPL-CCNC: 63 U/L (ref 4–60)
LYMPHOCYTES # BLD AUTO: 4.07 K/UL (ref 1–4.8)
MCH RBC QN AUTO: 34.6 PG (ref 27–31)
MCHC RBC AUTO-ENTMCNC: 35.1 G/DL (ref 32–36)
MCV RBC AUTO: 99 FL (ref 82–98)
MICROSCOPIC COMMENT: ABNORMAL
NITRITE UR QL STRIP: POSITIVE
NUCLEATED RBC (/100WBC) (SMH): 0 /100 WBC
OHS QRS DURATION: 90 MS
OHS QTC CALCULATION: 464 MS
PH UR STRIP: 6 [PH]
PLATELET # BLD AUTO: 192 K/UL (ref 150–450)
PMV BLD AUTO: 10.8 FL (ref 9.2–12.9)
POCT GLUCOSE: 168 MG/DL (ref 70–110)
POCT GLUCOSE: 255 MG/DL (ref 70–110)
POTASSIUM SERPL-SCNC: 3.9 MMOL/L (ref 3.5–5.1)
PROT SERPL-MCNC: 7.1 GM/DL (ref 6–8.4)
PROT UR QL STRIP: ABNORMAL
RBC # BLD AUTO: 5.08 M/UL (ref 4.6–6.2)
RBC #/AREA URNS AUTO: >100 /HPF
RELATIVE EOSINOPHIL (SMH): 2.7 % (ref 0–8)
RELATIVE LYMPHOCYTE (SMH): 40.4 % (ref 18–48)
RELATIVE MONOCYTE (SMH): 11.3 % (ref 4–15)
RELATIVE NEUTROPHIL (SMH): 44.6 % (ref 38–73)
SAMPLE: NORMAL
SODIUM SERPL-SCNC: 136 MMOL/L (ref 136–145)
SP GR UR STRIP: >=1.03
UROBILINOGEN UR STRIP-ACNC: NEGATIVE EU/DL
WBC # BLD AUTO: 10.08 K/UL (ref 3.9–12.7)
WBC #/AREA URNS AUTO: 20 /HPF

## 2025-04-01 PROCEDURE — 87086 URINE CULTURE/COLONY COUNT: CPT | Performed by: STUDENT IN AN ORGANIZED HEALTH CARE EDUCATION/TRAINING PROGRAM

## 2025-04-01 PROCEDURE — 25500020 PHARM REV CODE 255: Performed by: UROLOGY

## 2025-04-01 PROCEDURE — 87040 BLOOD CULTURE FOR BACTERIA: CPT | Performed by: STUDENT IN AN ORGANIZED HEALTH CARE EDUCATION/TRAINING PROGRAM

## 2025-04-01 PROCEDURE — 37000008 HC ANESTHESIA 1ST 15 MINUTES: Performed by: UROLOGY

## 2025-04-01 PROCEDURE — 11000001 HC ACUTE MED/SURG PRIVATE ROOM

## 2025-04-01 PROCEDURE — 87389 HIV-1 AG W/HIV-1&-2 AB AG IA: CPT | Performed by: EMERGENCY MEDICINE

## 2025-04-01 PROCEDURE — 86803 HEPATITIS C AB TEST: CPT | Performed by: EMERGENCY MEDICINE

## 2025-04-01 PROCEDURE — 25000003 PHARM REV CODE 250: Performed by: STUDENT IN AN ORGANIZED HEALTH CARE EDUCATION/TRAINING PROGRAM

## 2025-04-01 PROCEDURE — 63600175 PHARM REV CODE 636 W HCPCS: Performed by: NURSE ANESTHETIST, CERTIFIED REGISTERED

## 2025-04-01 PROCEDURE — 27200651 HC AIRWAY, LMA: Performed by: ANESTHESIOLOGY

## 2025-04-01 PROCEDURE — C2617 STENT, NON-COR, TEM W/O DEL: HCPCS | Performed by: UROLOGY

## 2025-04-01 PROCEDURE — 25000003 PHARM REV CODE 250: Performed by: UROLOGY

## 2025-04-01 PROCEDURE — 63600175 PHARM REV CODE 636 W HCPCS: Mod: JZ | Performed by: EMERGENCY MEDICINE

## 2025-04-01 PROCEDURE — 63600175 PHARM REV CODE 636 W HCPCS: Performed by: STUDENT IN AN ORGANIZED HEALTH CARE EDUCATION/TRAINING PROGRAM

## 2025-04-01 PROCEDURE — 83690 ASSAY OF LIPASE: CPT | Performed by: STUDENT IN AN ORGANIZED HEALTH CARE EDUCATION/TRAINING PROGRAM

## 2025-04-01 PROCEDURE — 63600175 PHARM REV CODE 636 W HCPCS: Performed by: UROLOGY

## 2025-04-01 PROCEDURE — 52332 CYSTOSCOPY AND TREATMENT: CPT | Mod: RT,,, | Performed by: UROLOGY

## 2025-04-01 PROCEDURE — 25000003 PHARM REV CODE 250: Performed by: INTERNAL MEDICINE

## 2025-04-01 PROCEDURE — 93010 ELECTROCARDIOGRAM REPORT: CPT | Mod: ,,, | Performed by: INTERNAL MEDICINE

## 2025-04-01 PROCEDURE — 99900035 HC TECH TIME PER 15 MIN (STAT)

## 2025-04-01 PROCEDURE — 85025 COMPLETE CBC W/AUTO DIFF WBC: CPT | Performed by: STUDENT IN AN ORGANIZED HEALTH CARE EDUCATION/TRAINING PROGRAM

## 2025-04-01 PROCEDURE — 36000706: Performed by: UROLOGY

## 2025-04-01 PROCEDURE — 94761 N-INVAS EAR/PLS OXIMETRY MLT: CPT

## 2025-04-01 PROCEDURE — 81001 URINALYSIS AUTO W/SCOPE: CPT | Performed by: STUDENT IN AN ORGANIZED HEALTH CARE EDUCATION/TRAINING PROGRAM

## 2025-04-01 PROCEDURE — BT1DYZZ FLUOROSCOPY OF RIGHT KIDNEY, URETER AND BLADDER USING OTHER CONTRAST: ICD-10-PCS | Performed by: UROLOGY

## 2025-04-01 PROCEDURE — C1769 GUIDE WIRE: HCPCS | Performed by: UROLOGY

## 2025-04-01 PROCEDURE — C1758 CATHETER, URETERAL: HCPCS | Performed by: UROLOGY

## 2025-04-01 PROCEDURE — 93005 ELECTROCARDIOGRAM TRACING: CPT | Performed by: INTERNAL MEDICINE

## 2025-04-01 PROCEDURE — 74420 UROGRAPHY RTRGR +-KUB: CPT | Mod: 26,,, | Performed by: UROLOGY

## 2025-04-01 PROCEDURE — 96361 HYDRATE IV INFUSION ADD-ON: CPT

## 2025-04-01 PROCEDURE — 80053 COMPREHEN METABOLIC PANEL: CPT | Performed by: STUDENT IN AN ORGANIZED HEALTH CARE EDUCATION/TRAINING PROGRAM

## 2025-04-01 PROCEDURE — 36415 COLL VENOUS BLD VENIPUNCTURE: CPT | Performed by: STUDENT IN AN ORGANIZED HEALTH CARE EDUCATION/TRAINING PROGRAM

## 2025-04-01 PROCEDURE — 37000009 HC ANESTHESIA EA ADD 15 MINS: Performed by: UROLOGY

## 2025-04-01 PROCEDURE — 63600175 PHARM REV CODE 636 W HCPCS: Mod: JZ | Performed by: INTERNAL MEDICINE

## 2025-04-01 PROCEDURE — 99223 1ST HOSP IP/OBS HIGH 75: CPT | Mod: ,,, | Performed by: UROLOGY

## 2025-04-01 PROCEDURE — 96375 TX/PRO/DX INJ NEW DRUG ADDON: CPT

## 2025-04-01 PROCEDURE — 94760 N-INVAS EAR/PLS OXIMETRY 1: CPT

## 2025-04-01 PROCEDURE — 71000033 HC RECOVERY, INTIAL HOUR: Performed by: UROLOGY

## 2025-04-01 PROCEDURE — 0T768DZ DILATION OF RIGHT URETER WITH INTRALUMINAL DEVICE, VIA NATURAL OR ARTIFICIAL OPENING ENDOSCOPIC: ICD-10-PCS | Performed by: UROLOGY

## 2025-04-01 PROCEDURE — 36000707: Performed by: UROLOGY

## 2025-04-01 PROCEDURE — 25500020 PHARM REV CODE 255: Performed by: STUDENT IN AN ORGANIZED HEALTH CARE EDUCATION/TRAINING PROGRAM

## 2025-04-01 PROCEDURE — 99285 EMERGENCY DEPT VISIT HI MDM: CPT | Mod: 25

## 2025-04-01 PROCEDURE — 96374 THER/PROPH/DIAG INJ IV PUSH: CPT

## 2025-04-01 DEVICE — STENT SET URETERAL 6X24CM: Type: IMPLANTABLE DEVICE | Site: URETER | Status: FUNCTIONAL

## 2025-04-01 RX ORDER — ASPIRIN 81 MG/1
81 TABLET ORAL DAILY
Status: DISCONTINUED | OUTPATIENT
Start: 2025-04-01 | End: 2025-04-03 | Stop reason: HOSPADM

## 2025-04-01 RX ORDER — ATORVASTATIN CALCIUM 40 MG/1
40 TABLET, FILM COATED ORAL NIGHTLY
Status: DISCONTINUED | OUTPATIENT
Start: 2025-04-01 | End: 2025-04-03 | Stop reason: HOSPADM

## 2025-04-01 RX ORDER — PROPOFOL 10 MG/ML
VIAL (ML) INTRAVENOUS
Status: DISCONTINUED | OUTPATIENT
Start: 2025-04-01 | End: 2025-04-01

## 2025-04-01 RX ORDER — MIDAZOLAM HYDROCHLORIDE 1 MG/ML
INJECTION INTRAMUSCULAR; INTRAVENOUS
Status: DISCONTINUED | OUTPATIENT
Start: 2025-04-01 | End: 2025-04-01

## 2025-04-01 RX ORDER — MORPHINE SULFATE 2 MG/ML
2 INJECTION, SOLUTION INTRAMUSCULAR; INTRAVENOUS EVERY 4 HOURS PRN
Status: DISCONTINUED | OUTPATIENT
Start: 2025-04-01 | End: 2025-04-01

## 2025-04-01 RX ORDER — OXYCODONE HYDROCHLORIDE 5 MG/1
5 TABLET ORAL ONCE AS NEEDED
Status: DISCONTINUED | OUTPATIENT
Start: 2025-04-01 | End: 2025-04-01 | Stop reason: HOSPADM

## 2025-04-01 RX ORDER — SODIUM,POTASSIUM PHOSPHATES 280-250MG
2 POWDER IN PACKET (EA) ORAL
Status: DISCONTINUED | OUTPATIENT
Start: 2025-04-01 | End: 2025-04-03 | Stop reason: HOSPADM

## 2025-04-01 RX ORDER — METOPROLOL SUCCINATE 25 MG/1
25 TABLET, EXTENDED RELEASE ORAL DAILY
Status: DISCONTINUED | OUTPATIENT
Start: 2025-04-02 | End: 2025-04-03 | Stop reason: HOSPADM

## 2025-04-01 RX ORDER — MONTELUKAST SODIUM 10 MG/1
10 TABLET ORAL NIGHTLY
Status: DISCONTINUED | OUTPATIENT
Start: 2025-04-01 | End: 2025-04-03 | Stop reason: HOSPADM

## 2025-04-01 RX ORDER — IBUPROFEN 200 MG
24 TABLET ORAL
Status: DISCONTINUED | OUTPATIENT
Start: 2025-04-01 | End: 2025-04-03 | Stop reason: HOSPADM

## 2025-04-01 RX ORDER — FENTANYL CITRATE 50 UG/ML
25 INJECTION, SOLUTION INTRAMUSCULAR; INTRAVENOUS EVERY 5 MIN PRN
Status: DISCONTINUED | OUTPATIENT
Start: 2025-04-01 | End: 2025-04-01 | Stop reason: HOSPADM

## 2025-04-01 RX ORDER — PHENAZOPYRIDINE HYDROCHLORIDE 95 MG/1
95 TABLET ORAL 3 TIMES DAILY PRN
COMMUNITY

## 2025-04-01 RX ORDER — ONDANSETRON HYDROCHLORIDE 2 MG/ML
4 INJECTION, SOLUTION INTRAVENOUS
Status: COMPLETED | OUTPATIENT
Start: 2025-04-01 | End: 2025-04-01

## 2025-04-01 RX ORDER — TALC
6 POWDER (GRAM) TOPICAL NIGHTLY PRN
Status: DISCONTINUED | OUTPATIENT
Start: 2025-04-01 | End: 2025-04-03 | Stop reason: HOSPADM

## 2025-04-01 RX ORDER — HYDROCODONE BITARTRATE AND ACETAMINOPHEN 5; 325 MG/1; MG/1
1 TABLET ORAL EVERY 6 HOURS PRN
Refills: 0 | Status: DISCONTINUED | OUTPATIENT
Start: 2025-04-01 | End: 2025-04-03 | Stop reason: HOSPADM

## 2025-04-01 RX ORDER — ACETAMINOPHEN 325 MG/1
650 TABLET ORAL EVERY 8 HOURS PRN
Status: DISCONTINUED | OUTPATIENT
Start: 2025-04-01 | End: 2025-04-03 | Stop reason: HOSPADM

## 2025-04-01 RX ORDER — FENTANYL CITRATE 50 UG/ML
INJECTION, SOLUTION INTRAMUSCULAR; INTRAVENOUS
Status: DISCONTINUED | OUTPATIENT
Start: 2025-04-01 | End: 2025-04-01

## 2025-04-01 RX ORDER — HYDROMORPHONE HYDROCHLORIDE 1 MG/ML
0.5 INJECTION, SOLUTION INTRAMUSCULAR; INTRAVENOUS; SUBCUTANEOUS
Refills: 0 | Status: COMPLETED | OUTPATIENT
Start: 2025-04-01 | End: 2025-04-01

## 2025-04-01 RX ORDER — METOCLOPRAMIDE HYDROCHLORIDE 5 MG/ML
10 INJECTION INTRAMUSCULAR; INTRAVENOUS EVERY 10 MIN PRN
Status: DISCONTINUED | OUTPATIENT
Start: 2025-04-01 | End: 2025-04-01 | Stop reason: HOSPADM

## 2025-04-01 RX ORDER — INSULIN ASPART 100 [IU]/ML
0-5 INJECTION, SOLUTION INTRAVENOUS; SUBCUTANEOUS
Status: DISCONTINUED | OUTPATIENT
Start: 2025-04-01 | End: 2025-04-03 | Stop reason: HOSPADM

## 2025-04-01 RX ORDER — ONDANSETRON HYDROCHLORIDE 2 MG/ML
INJECTION, SOLUTION INTRAMUSCULAR; INTRAVENOUS
Status: DISCONTINUED | OUTPATIENT
Start: 2025-04-01 | End: 2025-04-01

## 2025-04-01 RX ORDER — CEFTRIAXONE 2 G/1
2 INJECTION, POWDER, FOR SOLUTION INTRAMUSCULAR; INTRAVENOUS ONCE
Status: COMPLETED | OUTPATIENT
Start: 2025-04-01 | End: 2025-04-01

## 2025-04-01 RX ORDER — CEFEPIME HYDROCHLORIDE 2 G/1
2 INJECTION, POWDER, FOR SOLUTION INTRAVENOUS
Status: DISCONTINUED | OUTPATIENT
Start: 2025-04-01 | End: 2025-04-03

## 2025-04-01 RX ORDER — SODIUM CHLORIDE 9 MG/ML
INJECTION, SOLUTION INTRAVENOUS CONTINUOUS
Status: DISCONTINUED | OUTPATIENT
Start: 2025-04-01 | End: 2025-04-03 | Stop reason: HOSPADM

## 2025-04-01 RX ORDER — NALOXONE HCL 0.4 MG/ML
0.02 VIAL (ML) INJECTION
Status: DISCONTINUED | OUTPATIENT
Start: 2025-04-01 | End: 2025-04-03 | Stop reason: HOSPADM

## 2025-04-01 RX ORDER — HYDROMORPHONE HYDROCHLORIDE 1 MG/ML
1 INJECTION, SOLUTION INTRAMUSCULAR; INTRAVENOUS; SUBCUTANEOUS EVERY 6 HOURS PRN
Status: DISCONTINUED | OUTPATIENT
Start: 2025-04-01 | End: 2025-04-03 | Stop reason: HOSPADM

## 2025-04-01 RX ORDER — ALUMINUM HYDROXIDE, MAGNESIUM HYDROXIDE, AND SIMETHICONE 1200; 120; 1200 MG/30ML; MG/30ML; MG/30ML
30 SUSPENSION ORAL 4 TIMES DAILY PRN
Status: DISCONTINUED | OUTPATIENT
Start: 2025-04-01 | End: 2025-04-03 | Stop reason: HOSPADM

## 2025-04-01 RX ORDER — DEXAMETHASONE SODIUM PHOSPHATE 4 MG/ML
INJECTION, SOLUTION INTRA-ARTICULAR; INTRALESIONAL; INTRAMUSCULAR; INTRAVENOUS; SOFT TISSUE
Status: DISCONTINUED | OUTPATIENT
Start: 2025-04-01 | End: 2025-04-01

## 2025-04-01 RX ORDER — LANOLIN ALCOHOL/MO/W.PET/CERES
800 CREAM (GRAM) TOPICAL
Status: DISCONTINUED | OUTPATIENT
Start: 2025-04-01 | End: 2025-04-03 | Stop reason: HOSPADM

## 2025-04-01 RX ORDER — TAMSULOSIN HYDROCHLORIDE 0.4 MG/1
0.4 CAPSULE ORAL NIGHTLY
Qty: 30 CAPSULE | Refills: 0 | Status: SHIPPED | OUTPATIENT
Start: 2025-04-01 | End: 2026-04-01

## 2025-04-01 RX ORDER — LISINOPRIL 10 MG/1
10 TABLET ORAL DAILY
Status: DISCONTINUED | OUTPATIENT
Start: 2025-04-02 | End: 2025-04-03 | Stop reason: HOSPADM

## 2025-04-01 RX ORDER — LIDOCAINE HYDROCHLORIDE 20 MG/ML
INJECTION INTRAVENOUS
Status: DISCONTINUED | OUTPATIENT
Start: 2025-04-01 | End: 2025-04-01

## 2025-04-01 RX ORDER — ACETAMINOPHEN 325 MG/1
650 TABLET ORAL EVERY 4 HOURS PRN
Status: DISCONTINUED | OUTPATIENT
Start: 2025-04-01 | End: 2025-04-03 | Stop reason: HOSPADM

## 2025-04-01 RX ORDER — LIDOCAINE HYDROCHLORIDE 10 MG/ML
1 INJECTION, SOLUTION EPIDURAL; INFILTRATION; INTRACAUDAL; PERINEURAL ONCE
Status: DISCONTINUED | OUTPATIENT
Start: 2025-04-01 | End: 2025-04-01 | Stop reason: HOSPADM

## 2025-04-01 RX ORDER — ONDANSETRON HYDROCHLORIDE 2 MG/ML
4 INJECTION, SOLUTION INTRAVENOUS EVERY 6 HOURS PRN
Status: DISCONTINUED | OUTPATIENT
Start: 2025-04-01 | End: 2025-04-03 | Stop reason: HOSPADM

## 2025-04-01 RX ORDER — IBUPROFEN 200 MG
16 TABLET ORAL
Status: DISCONTINUED | OUTPATIENT
Start: 2025-04-01 | End: 2025-04-03 | Stop reason: HOSPADM

## 2025-04-01 RX ORDER — MORPHINE SULFATE 4 MG/ML
4 INJECTION, SOLUTION INTRAMUSCULAR; INTRAVENOUS
Refills: 0 | Status: COMPLETED | OUTPATIENT
Start: 2025-04-01 | End: 2025-04-01

## 2025-04-01 RX ORDER — GLUCAGON 1 MG
1 KIT INJECTION
Status: DISCONTINUED | OUTPATIENT
Start: 2025-04-01 | End: 2025-04-03 | Stop reason: HOSPADM

## 2025-04-01 RX ORDER — ENOXAPARIN SODIUM 100 MG/ML
40 INJECTION SUBCUTANEOUS EVERY 24 HOURS
Status: DISCONTINUED | OUTPATIENT
Start: 2025-04-01 | End: 2025-04-03 | Stop reason: HOSPADM

## 2025-04-01 RX ADMIN — LIDOCAINE HYDROCHLORIDE 100 MG: 20 INJECTION, SOLUTION INTRAVENOUS at 02:04

## 2025-04-01 RX ADMIN — ENOXAPARIN SODIUM 40 MG: 40 INJECTION SUBCUTANEOUS at 05:04

## 2025-04-01 RX ADMIN — CEFEPIME 2 G: 2 INJECTION, POWDER, FOR SOLUTION INTRAVENOUS at 01:04

## 2025-04-01 RX ADMIN — SODIUM CHLORIDE: 9 INJECTION, SOLUTION INTRAVENOUS at 03:04

## 2025-04-01 RX ADMIN — HYDROMORPHONE HYDROCHLORIDE 0.5 MG: 1 INJECTION, SOLUTION INTRAMUSCULAR; INTRAVENOUS; SUBCUTANEOUS at 07:04

## 2025-04-01 RX ADMIN — IOHEXOL 100 ML: 350 INJECTION, SOLUTION INTRAVENOUS at 06:04

## 2025-04-01 RX ADMIN — ONDANSETRON 4 MG: 2 INJECTION INTRAMUSCULAR; INTRAVENOUS at 02:04

## 2025-04-01 RX ADMIN — MELATONIN TAB 3 MG 6 MG: 3 TAB at 08:04

## 2025-04-01 RX ADMIN — MIDAZOLAM HYDROCHLORIDE 2 MG: 1 INJECTION, SOLUTION INTRAMUSCULAR; INTRAVENOUS at 02:04

## 2025-04-01 RX ADMIN — SODIUM CHLORIDE 1000 ML: 9 INJECTION, SOLUTION INTRAVENOUS at 05:04

## 2025-04-01 RX ADMIN — DEXAMETHASONE SODIUM PHOSPHATE 4 MG: 4 INJECTION, SOLUTION INTRA-ARTICULAR; INTRALESIONAL; INTRAMUSCULAR; INTRAVENOUS; SOFT TISSUE at 02:04

## 2025-04-01 RX ADMIN — MORPHINE SULFATE 4 MG: 4 INJECTION INTRAVENOUS at 05:04

## 2025-04-01 RX ADMIN — HYDROMORPHONE HYDROCHLORIDE 1 MG: 1 INJECTION, SOLUTION INTRAMUSCULAR; INTRAVENOUS; SUBCUTANEOUS at 06:04

## 2025-04-01 RX ADMIN — SODIUM CHLORIDE: 9 INJECTION, SOLUTION INTRAVENOUS at 01:04

## 2025-04-01 RX ADMIN — MONTELUKAST 10 MG: 10 TABLET, FILM COATED ORAL at 08:04

## 2025-04-01 RX ADMIN — ONDANSETRON 4 MG: 2 INJECTION INTRAMUSCULAR; INTRAVENOUS at 05:04

## 2025-04-01 RX ADMIN — ASPIRIN 81 MG: 81 TABLET, COATED ORAL at 01:04

## 2025-04-01 RX ADMIN — INSULIN ASPART 1 UNITS: 100 INJECTION, SOLUTION INTRAVENOUS; SUBCUTANEOUS at 08:04

## 2025-04-01 RX ADMIN — ATORVASTATIN CALCIUM 40 MG: 40 TABLET, FILM COATED ORAL at 08:04

## 2025-04-01 RX ADMIN — PROPOFOL 120 MG: 10 INJECTION, EMULSION INTRAVENOUS at 02:04

## 2025-04-01 RX ADMIN — CEFTRIAXONE SODIUM 2 G: 2 INJECTION, POWDER, FOR SOLUTION INTRAMUSCULAR; INTRAVENOUS at 07:04

## 2025-04-01 RX ADMIN — FENTANYL CITRATE 50 MCG: 50 INJECTION, SOLUTION INTRAMUSCULAR; INTRAVENOUS at 02:04

## 2025-04-01 NOTE — ANESTHESIA POSTPROCEDURE EVALUATION
Anesthesia Post Evaluation    Patient: Aleksandra Keys    Procedure(s) Performed: Procedure(s) (LRB):  CYSTOSCOPY, WITH URETERAL STENT INSERTION (Right)    Final Anesthesia Type: general      Patient location during evaluation: PACU  Patient participation: Yes- Able to Participate  Level of consciousness: awake and alert and oriented  Post-procedure vital signs: reviewed and stable  Pain management: adequate  Airway patency: patent  SANNA mitigation strategies: Multimodal analgesia and Extubation while patient is awake  PONV status at discharge: No PONV  Anesthetic complications: no      Cardiovascular status: blood pressure returned to baseline  Respiratory status: unassisted, spontaneous ventilation and room air  Hydration status: euvolemic  Follow-up not needed.              Vitals Value Taken Time   /63 04/01/25 15:27   Temp 37.1 °C (98.8 °F) 04/01/25 15:25   Pulse 64 04/01/25 15:31   Resp 13 04/01/25 15:31   SpO2 96 % 04/01/25 15:31   Vitals shown include unfiled device data.      No case tracking events are documented in the log.      Pain/Yariel Score: Pain Rating Prior to Med Admin: 10 (4/1/2025  7:25 AM)  Pain Rating Post Med Admin: 4 (4/1/2025  7:56 AM)  Yariel Score: 10 (4/1/2025  3:25 PM)

## 2025-04-01 NOTE — ED PROVIDER NOTES
Encounter Date: 4/1/2025       History     Chief Complaint   Patient presents with    Flank Pain     Pt reports hx of kidney stones with pain in his R flank     HPI    Aleksandra Keys is a 69 y.o. male with a past medical history of CAD status post CABG, hyperlipidemia, previous kidney stones, and diverticulitis that presents emergency department for 3 days of right-sided flank pain.  Pain initially was in the left side and he thought could potentially have been his diverticulitis.  Pain then migrated over to the right and was back pain radiating to his groin.  Pain is severe and he has difficulty finding comfortable position.  Does endorse nausea without vomiting.  Had difficulty taking medications secondary to nausea and pain.  States that he has low-grade fever, but did not take his temperature.  Denies vomiting, constipation, diarrhea, urinary symptoms, chest pain, and shortness of breath.      Review of patient's allergies indicates:   Allergen Reactions    Dapagliflozin Other (See Comments)     myalgia    Niacin      Past Medical History:   Diagnosis Date    AK (actinic keratosis)     Anxiety     Benign hypertension     Diabetes mellitus, type 2     Diverticulitis     Fatty liver     GERD (gastroesophageal reflux disease)     Hyperlipidemia     ITP (idiopathic thrombocytopenic purpura)     Kidney stones     Occlusive coronary artery disease      Past Surgical History:   Procedure Laterality Date    COLONOSCOPY  04/11/2016    Dr. Abbott, multiple polyps, recheck 6 month    CORONARY ARTERY BYPASS GRAFT      EYE SURGERY      SPLENECTOMY, TOTAL       Family History   Problem Relation Name Age of Onset    Diabetes Mother      Hypertension Mother      Skin cancer Mother      Diabetes Brother      Heart disease Brother      Hyperlipidemia Father      Hypertension Father      Diabetes Maternal Aunt      Heart disease Maternal Uncle      Cancer Paternal Aunt          thyroid    Heart disease Paternal Uncle      Cancer  Paternal Uncle          lung    Diabetes Maternal Grandmother      Heart disease Maternal Grandfather      Heart disease Paternal Grandfather      No Known Problems Son      Melanoma Neg Hx      Psoriasis Neg Hx      Lupus Neg Hx      Eczema Neg Hx       Social History[1]  Review of Systems   Constitutional:  Positive for fever.   HENT:  Negative for sore throat.    Respiratory:  Negative for shortness of breath.    Cardiovascular:  Negative for chest pain.   Gastrointestinal:  Positive for abdominal pain and nausea. Negative for constipation, diarrhea and vomiting.   Genitourinary:  Positive for flank pain. Negative for dysuria, frequency and hematuria.   Musculoskeletal:  Negative for back pain.   Skin:  Negative for rash.   Neurological:  Negative for dizziness, weakness, numbness and headaches.   Hematological:  Does not bruise/bleed easily.       Physical Exam     Initial Vitals [04/01/25 0444]   BP Pulse Resp Temp SpO2   (!) 137/90 74 20 98.2 °F (36.8 °C) 95 %      MAP       --         Physical Exam    Nursing note and vitals reviewed.  Constitutional: He appears well-developed and well-nourished.   HENT:   Head: Normocephalic and atraumatic.   Eyes: EOM are normal. Pupils are equal, round, and reactive to light.   Neck:   Normal range of motion.  Cardiovascular:  Normal rate, regular rhythm and normal heart sounds.           Pulmonary/Chest: Breath sounds normal. No respiratory distress.   Abdominal: Abdomen is soft. He exhibits no distension. There is abdominal tenderness.   Minimal right-sided CVA tenderness.  Minimal tenderness to palpation along the right flank.  No peritoneal signs. There is no rebound.   Musculoskeletal:         General: Normal range of motion.      Cervical back: Normal range of motion.     Neurological: He is alert and oriented to person, place, and time. GCS eye subscore is 4. GCS verbal subscore is 5. GCS motor subscore is 6.   Skin: Capillary refill takes less than 2 seconds.    Psychiatric: He has a normal mood and affect.         ED Course   Procedures  Labs Reviewed   COMPREHENSIVE METABOLIC PANEL - Abnormal       Result Value    Sodium 136      Potassium 3.9      Chloride 101      CO2 27      Glucose 195 (*)     BUN 19      Creatinine 1.1      Calcium 9.1      Protein Total 7.1      Albumin 4.3      Bilirubin Total 1.1 (*)     ALP 72      AST 22      ALT 27      Anion Gap 8      eGFR >60     LIPASE - Abnormal    Lipase Level 63 (*)    URINALYSIS, REFLEX TO URINE CULTURE - Abnormal    Color, UA Orange (*)     Appearance, UA Hazy (*)     Spec Grav UA >=1.030 (*)     pH, UA 6.0      Protein, UA 1+ (*)     Glucose, UA 3+ (*)     Ketones, UA Negative      Blood, UA 3+ (*)     Bilirubin, UA Negative      Urobilinogen, UA Negative      Nitrites, UA Positive (*)     Leukocyte Esterase, UA Negative     CBC WITH DIFFERENTIAL - Abnormal    WBC 10.08      RBC 5.08      Hgb 17.6      Hct 50.1      MCV 99 (*)     MCH 34.6 (*)     MCHC 35.1      RDW 11.8      Platelet Count 192      MPV 10.8      Nucleated RBC 0      Neut % 44.6      Lymph % 40.4      Mono % 11.3      Eos % 2.7      Basophil % 0.7      Imm Grans % 0.3      Neut # 4.5      Lymph # 4.07      Mono # 1.14 (*)     Eos # 0.27      Baso # 0.07      Imm Grans # 0.03     URINALYSIS MICROSCOPIC - Abnormal    RBC, UA >100 (*)     WBC, UA 20 (*)     Microscopic Comment       CULTURE, URINE   CULTURE, BLOOD   CULTURE, BLOOD   CBC W/ AUTO DIFFERENTIAL    Narrative:     The following orders were created for panel order CBC W/ AUTO DIFFERENTIAL.  Procedure                               Abnormality         Status                     ---------                               -----------         ------                     CBC with Differential[[id number]]       Abnormal            Final result                 Please view results for these tests on the individual orders.   HEPATITIS C ANTIBODY   HIV 1 / 2 ANTIBODY   ISTAT LACTATE    POC Lactate 1.42       Sample VENOUS     POCT LACTATE     EKG Readings: (Independently Interpreted)   Initial Reading: No STEMI. Rhythm: Normal Sinus Rhythm. Ectopy: No Ectopy PVCs. Conduction: Normal. ST Segments: Normal ST Segments. T Waves: Normal. Clinical Impression: Normal Sinus Rhythm   +     ECG Results              EKG 12-lead (In process)        Collection Time Result Time QRS Duration OHS QTC Calculation    04/01/25 05:04:37 04/01/25 05:10:05 90 464                     In process by Interface, Lab In [facility] (04/01/25 05:10:09)                   Narrative:    Test Reason : R10.9,    Vent. Rate :  70 BPM     Atrial Rate :  70 BPM     P-R Int : 144 ms          QRS Dur :  90 ms      QT Int : 430 ms       P-R-T Axes :  28  85  66 degrees    QTcB Int : 464 ms    Sinus rhythm with occasional Premature ventricular complexes  Cannot rule out Anterior infarct ,age undetermined  Abnormal ECG  When compared with ECG of 02-Jul-2024 05:24,  Premature ventricular complexes are now Present  Minimal criteria for Anterior infarct are now Present  Inverted T waves have replaced nonspecific T wave abnormality in Anterior  leads    Referred By:            Confirmed By:                                   Imaging Results              X-Ray Chest AP Portable (Final result)  Result time 04/01/25 07:41:04      Final result by Tej Tirado MD (04/01/25 07:41:04)                   Impression:      1. No acute pulmonary process.      Electronically signed by: Tej Tirado  Date:    04/01/2025  Time:    07:41               Narrative:    EXAMINATION:  XR CHEST AP PORTABLE    CLINICAL HISTORY:  Sepsis;    COMPARISON:  September 2022    FINDINGS:  AP view demonstrates normal heart size.  There has been previous median sternotomy.  There are no infiltrates or effusions.  No acute osseous abnormalities are identified.                                        CT Abdomen Pelvis With IV Contrast NO Oral Contrast (Final result)  Result time 04/01/25  07:06:53      Final result by Jayden Francisco MD (04/01/25 07:06:53)                   Impression:      1. Right UPJ calculus measuring 8 x 8 mm causing mild right hydronephrosis.  2. Nonobstructing left renal calculus.  3. Coronary artery calcification.  4. Diverticulosis.  5. Unchanged irregular atherosclerotic plaque infrarenal abdominal aorta as described.  This report was flagged in Epic as abnormal.      Electronically signed by: Jayden Francisco  Date:    04/01/2025  Time:    07:06               Narrative:    EXAMINATION:  CT ABDOMEN PELVIS WITH IV CONTRAST    CLINICAL HISTORY:  Flank pain, kidney stone suspected;    TECHNIQUE:  CT abdomen and pelvis with 100 mL Omnipaque 350.    COMPARISON:  11/01/2024    FINDINGS:  CT ABDOMEN:    Coronary artery calcification partially visualized.  Visualized lung bases are clear.    Mild low-attenuation throughout hepatic parenchyma suggest hepatic steatosis.  Gallbladder, pancreas, and adrenals are normal.  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  8 x 8 mm calculus at right UPJ causes mild right hydronephrosis with slightly delayed right nephrogram.  Mild right perinephric fat stranding is also evident.  Bilateral kidneys show tiny cortical hypodensities which are too small to characterize but likely to reflect cysts.  4 cm superior left renal cyst also evident.  Tiny nonobstructing calculus in superior left renal collecting system unchanged.  Negative for left hydronephrosis.    Irregular atherosclerotic plaque is moderate in infrarenal abdominal aorta, unchanged, including area of focal plaque ulceration or chronic dissection which is unchanged.  Mild iliac atherosclerotic plaque is present bilaterally.    Diverticula arise from the colon.  Large and small intestines otherwise unremarkable.  No free intraperitoneal gas.    Mild degenerative changes of the spine.    CT PELVIS:    Bladder contains small amount of urine but is otherwise unremarkable.  No free pelvic fluid.  Prostate is  unchanged.  No acute osseous abnormality.                                       Medications   aspirin EC tablet 81 mg (81 mg Oral Given 4/1/25 1341)   atorvastatin tablet 40 mg (has no administration in time range)   lisinopriL tablet 10 mg (has no administration in time range)   metoprolol succinate (TOPROL-XL) 24 hr tablet 25 mg (has no administration in time range)   montelukast tablet 10 mg (has no administration in time range)   melatonin tablet 6 mg (has no administration in time range)   ondansetron injection 4 mg (has no administration in time range)   acetaminophen tablet 650 mg (has no administration in time range)   aluminum-magnesium hydroxide-simethicone 200-200-20 mg/5 mL suspension 30 mL (has no administration in time range)   acetaminophen tablet 650 mg (has no administration in time range)   HYDROcodone-acetaminophen 5-325 mg per tablet 1 tablet (has no administration in time range)   naloxone 0.4 mg/mL injection 0.02 mg (has no administration in time range)   potassium bicarbonate disintegrating tablet 50 mEq (has no administration in time range)   potassium bicarbonate disintegrating tablet 35 mEq (has no administration in time range)   potassium bicarbonate disintegrating tablet 60 mEq (has no administration in time range)   magnesium oxide tablet 800 mg (has no administration in time range)   magnesium oxide tablet 800 mg (has no administration in time range)   potassium, sodium phosphates 280-160-250 mg packet 2 packet (has no administration in time range)   potassium, sodium phosphates 280-160-250 mg packet 2 packet (has no administration in time range)   potassium, sodium phosphates 280-160-250 mg packet 2 packet (has no administration in time range)   glucose chewable tablet 16 g (has no administration in time range)   glucose chewable tablet 24 g (has no administration in time range)   dextrose 50% injection 12.5 g (has no administration in time range)   dextrose 50% injection 25 g (has no  administration in time range)   glucagon (human recombinant) injection 1 mg (has no administration in time range)   enoxaparin injection 40 mg (has no administration in time range)   0.9% NaCl infusion ( Intravenous New Bag 4/1/25 1341)   insulin aspart U-100 pen 0-5 Units (has no administration in time range)   morphine injection 2 mg (has no administration in time range)   ceFEPIme injection 2 g (2 g Intravenous Given 4/1/25 1341)   LIDOcaine (PF) 10 mg/ml (1%) injection 10 mg (has no administration in time range)   electrolyte-S (ISOLYTE) (has no administration in time range)   sodium chloride 0.9% bolus 1,000 mL 1,000 mL (0 mLs Intravenous Stopped 4/1/25 0637)   ondansetron injection 4 mg (4 mg Intravenous Given 4/1/25 0536)   morphine injection 4 mg (4 mg Intravenous Given 4/1/25 0536)   cefTRIAXone injection 2 g (2 g Intravenous Given 4/1/25 0715)   iohexoL (OMNIPAQUE 350) injection 100 mL (100 mLs Intravenous Given 4/1/25 0614)   HYDROmorphone injection 0.5 mg (0.5 mg Intravenous Given 4/1/25 0725)     Medical Decision Making  Aleksandra Keys is a 69 y.o. male with a past medical history of CAD status post CABG, hyperlipidemia, previous kidney stones, and diverticulitis that presents emergency department for 3 days of right-sided flank pain.  Vitals were stable.  Uncomfortable appearing male.  Right-sided CVA tenderness.  Differential includes but isn't limited to UTI, kidney stone, pyelonephritis, diverticulitis, appendicitis, pancreatitis, biliary colic, and ACS.  EKG shows normal sinus rhythm without acute ST segment or T-wave changes.  It does show single PVC.  Remainder of workup is pending.  We will give fluids, Zofran, and morphine.  Disposition pending workup.    Amount and/or Complexity of Data Reviewed  Labs: ordered.  Radiology: ordered.    Risk  Prescription drug management.  Decision regarding hospitalization.                                      Clinical Impression:  Final diagnoses:  [R10.9]  Flank pain  [Z13.6] Screening for cardiovascular condition  [N20.1] Ureterolithiasis (Primary)          ED Disposition Condition    Admit Stable                    [1]   Social History  Tobacco Use    Smoking status: Former     Current packs/day: 0.00     Types: Cigarettes     Quit date: 10/9/2010     Years since quittin.4    Smokeless tobacco: Never   Substance Use Topics    Alcohol use: Yes     Comment: occasionally    Drug use: No        David Borjas MD  [phone]

## 2025-04-01 NOTE — TRANSFER OF CARE
"Anesthesia Transfer of Care Note    Patient: Aleksandra Keys    Procedure(s) Performed: Procedure(s) (LRB):  CYSTOSCOPY, WITH URETERAL STENT INSERTION (Right)    Patient location: PACU    Anesthesia Type: general    Transport from OR: Transported from OR on 2-3 L/min O2 by NC with adequate spontaneous ventilation    Post pain: adequate analgesia    Post assessment: no apparent anesthetic complications and tolerated procedure well    Post vital signs: stable    Level of consciousness: awake, alert and oriented    Nausea/Vomiting: no nausea/vomiting    Complications: none    Transfer of care protocol was followed    Last vitals: Visit Vitals  /67 (BP Location: Left arm, Patient Position: Lying)   Pulse 61   Temp 36.8 °C (98.2 °F) (Temporal)   Resp 16   Ht 5' 1" (1.549 m)   Wt 77.1 kg (170 lb)   SpO2 (!) 90%   BMI 32.12 kg/m²     "

## 2025-04-01 NOTE — PLAN OF CARE
Unable to complete assessment at this time as pt in procedure.     04/01/25 1522   Discharge Assessment   Assessment Type Discharge Planning Assessment

## 2025-04-01 NOTE — H&P
[facility] Medicine  History & Physical    Patient Name: Aleksandra Keys  MRN: [id number]  Patient Class: IP- Inpatient  Admission Date: 4/1/2025  Attending Physician: Winston Bruce MD   Primary Care Provider: Fernanda Hermosillo MD         Patient information was obtained from patient and ER records.     Subjective:     Principal Problem:Ureterolithiasis    Chief Complaint:   Chief Complaint   Patient presents with    Flank Pain     Pt reports hx of kidney stones with pain in his R flank        HPI: Aleksandra Keys is a 69 y.o. male with a past medical history of CAD status post CABG, hyperlipidemia, previous kidney stones, and diverticulitis that presents with 3 days of right-sided flank pain.  Pain initially was in the left side and he thought could potentially have been his diverticulitis.  Pain then migrated over to the right and was back pain radiating to his groin.  Pain is severe and he has difficulty finding comfortable position.  Does endorse nausea without vomiting.  Had difficulty taking medications secondary to nausea and pain.  States that he has low-grade fever, but did not take his temperature.  Denies vomiting, constipation, diarrhea, urinary symptoms, chest pain, and shortness of breath.     In the ED he was afebrile, hemodynamically stable. UA was suggestive of infection. CT abdomen pelvis showed 8 x 8 mm right kidney stone at [facility] with mild right hydronephrosis. Patient was admitted for further management.     Past Medical History:   Diagnosis Date    AK (actinic keratosis)     Anxiety     Benign hypertension     Diabetes mellitus, type 2     Diverticulitis     Fatty liver     GERD (gastroesophageal reflux disease)     Hyperlipidemia     ITP (idiopathic thrombocytopenic purpura)     Kidney stones     Occlusive coronary artery disease        Past Surgical History:   Procedure Laterality Date    COLONOSCOPY  04/11/2016    Dr. Abbott, multiple  polyps, recheck 6 month    CORONARY ARTERY BYPASS GRAFT      EYE SURGERY      SPLENECTOMY, TOTAL         Review of patient's allergies indicates:   Allergen Reactions    Dapagliflozin Other (See Comments)     myalgia    Niacin        No current facility-administered medications on file prior to encounter.     Current Outpatient Medications on File Prior to Encounter   Medication Sig    aspirin (ECOTRIN) 81 MG EC tablet Take 81 mg by mouth once daily.    atorvastatin (LIPITOR) 40 MG tablet Take 1 tablet (40 mg total) by mouth every evening.    lisinopriL 10 MG tablet Take 10 mg by mouth once daily.    metFORMIN (GLUCOPHAGE) 1000 MG tablet Take 1,000 mg by mouth 2 (two) times daily.    metoprolol succinate (TOPROL-XL) 25 MG 24 hr tablet Take 1 tablet by mouth once daily.    montelukast (SINGULAIR) 10 mg tablet Take 10 mg by mouth every evening.    phenazopyridine (PYRIDIUM) 95 MG tablet Take 95 mg by mouth 3 (three) times daily as needed for Pain.    [DISCONTINUED] fluticasone propionate (FLONASE) 50 mcg/actuation nasal spray     [DISCONTINUED] ipratropium (ATROVENT) 42 mcg (0.06 %) nasal spray USE 2 SPRAY(S) IN EACH NOSTRIL THREE TIMES DAILY AS NEEDED FOR UP TO 30 DAYS    [DISCONTINUED] JARDIANCE 25 mg tablet Take 25 mg by mouth.     Family History       Problem Relation (Age of Onset)    Cancer Paternal Aunt, Paternal Uncle    Diabetes Mother, Brother, Maternal Aunt, Maternal Grandmother    Heart disease Brother, Maternal Uncle, Paternal Uncle, Maternal Grandfather, Paternal Grandfather    Hyperlipidemia Father    Hypertension Mother, Father    No Known Problems Son    Skin cancer Mother          Tobacco Use    Smoking status: Former     Current packs/day: 0.00     Types: Cigarettes     Quit date: 10/9/2010     Years since quittin.4    Smokeless tobacco: Never   Substance and Sexual Activity    Alcohol use: Yes     Comment: occasionally    Drug use: No    Sexual activity: Not on file     Review of Systems    Constitutional:  Positive for chills. Negative for fatigue and fever.   HENT:  Negative for congestion, ear pain, sinus pressure and sore throat.    Eyes:  Negative for redness and itching.   Respiratory:  Negative for cough, chest tightness and shortness of breath.    Cardiovascular:  Negative for chest pain, palpitations and leg swelling.   Gastrointestinal:  Positive for nausea. Negative for abdominal pain, constipation, diarrhea and vomiting.   Endocrine: Negative for polydipsia, polyphagia and polyuria.   Genitourinary:  Positive for dysuria and flank pain. Negative for frequency, hematuria and urgency.   Musculoskeletal:  Positive for back pain. Negative for joint swelling and myalgias.   Skin:  Negative for pallor, rash and wound.   Neurological:  Negative for dizziness, syncope, weakness, light-headedness, numbness and headaches.   Hematological:  Negative for adenopathy. Does not bruise/bleed easily.   Psychiatric/Behavioral:  Negative for agitation, hallucinations and suicidal ideas. The patient is not nervous/anxious.    All other systems reviewed and are negative.    Objective:     Vital Signs (Most Recent):  Temp: 98.2 °F (36.8 °C) (04/01/25 1402)  Pulse: 61 (04/01/25 1402)  Resp: 16 (04/01/25 1402)  BP: 115/67 (04/01/25 1402)  SpO2: (!) 90 % (04/01/25 1402) Vital Signs (24h Range):  Temp:  [98.1 °F (36.7 °C)-98.5 °F (36.9 °C)] 98.2 °F (36.8 °C)  Pulse:  [55-88] 61  Resp:  [16-20] 16  SpO2:  [90 %-96 %] 90 %  BP: ()/() 115/67     Weight: 77.1 kg (170 lb)  Body mass index is 32.12 kg/m².     Physical Exam  Vitals and nursing note reviewed.   Constitutional:       General: He is not in acute distress.     Appearance: He is obese. He is not toxic-appearing.   HENT:      Head: Atraumatic.      Mouth/Throat:      Mouth: Mucous membranes are moist.      Pharynx: Oropharynx is clear.   Eyes:      General: No scleral icterus.     Conjunctiva/sclera: Conjunctivae normal.      Pupils: Pupils are  equal, round, and reactive to light.   Cardiovascular:      Rate and Rhythm: Normal rate and regular rhythm.      Heart sounds: No murmur heard.  Pulmonary:      Effort: No respiratory distress.      Breath sounds: No wheezing, rhonchi or rales.   Abdominal:      General: Abdomen is flat. Bowel sounds are normal.      Palpations: Abdomen is soft.   Musculoskeletal:         General: No swelling or deformity.      Cervical back: No rigidity or tenderness.   Skin:     Coloration: Skin is not jaundiced or pale.      Findings: No bruising, erythema or rash.   Neurological:      General: No focal deficit present.      Mental Status: He is alert and oriented to person, place, and time.      Cranial Nerves: No cranial nerve deficit.      Sensory: No sensory deficit.      Motor: No weakness.   Psychiatric:         Mood and Affect: Mood normal.         Behavior: Behavior normal.              CRANIAL NERVES     CN III, IV, VI   Pupils are equal, round, and reactive to light.       Significant Labs: All pertinent labs within the past 24 hours have been reviewed.  CBC:   Recent Labs   Lab 04/01/25  0511   WBC 10.08   HGB 17.6   HCT 50.1        CMP:   Recent Labs   Lab 04/01/25  0511      K 3.9   CO2 27   BUN 19   CREATININE 1.1   CALCIUM 9.1   ALBUMIN 4.3   BILITOT 1.1*   ALKPHOS 72   AST 22   ALT 27       Significant Imaging: I have reviewed all pertinent imaging results/findings within the past 24 hours.  Assessment/Plan:     Assessment & Plan  Ureterolithiasis  Right side hydronephrosis   CT abdomen showed 8 x 8 mm stone at [facility]   Urology consulted, stent placement today   IVF   Analgesics       Hypertension associated with diabetes  Resume home meds     Anxiety  Resume home meds    Hyperlipidemia  Cont statin     GERD (gastroesophageal reflux disease)  Cont PPI    Occlusive coronary artery disease  Cont aspirin, statin   Obesity (BMI 30.0-34.9)  Diet and exercise counseling     Diabetes mellitus type 2,  "controlled  Patient's FSGs are controlled on current medication regimen.  Last A1c reviewed-   Lab Results   Component Value Date    HGBA1C 6.7 (H) 10/25/2024     Most recent fingerstick glucose reviewed- No results for input(s): "POCTGLUCOSE" in the last 24 hours.  Current correctional scale  Low  Maintain anti-hyperglycemic dose as follows-   Antihyperglycemics (From admission, onward)      Start     Stop Route Frequency Ordered    04/01/25 1402  insulin aspart U-100 pen 0-5 Units         -- SubQ Before meals & nightly PRN 04/01/25 1303          Hold Oral hypoglycemics while patient is in the hospital.  UTI (urinary tract infection)  Previous urine culture from years ago shows enterococcus   Follow blood and urine culture   Cont cefepime for now     VTE Risk Mitigation (From admission, onward)           Ordered     enoxaparin injection 40 mg  Daily         04/01/25 1303     IP VTE HIGH RISK PATIENT  Once         04/01/25 1303     Place sequential compression device  Until discontinued         04/01/25 1303                                    Winston Bruce MD  Department of Hospital Medicine  [facility]          "

## 2025-04-01 NOTE — ANESTHESIA PROCEDURE NOTES
Intubation    Date/Time: 4/1/2025 2:38 PM    Performed by: Steve Florian CRNA  Authorized by: Jet Lin MD    Intubation:     Induction:  Intravenous    Intubated:  Postinduction    Mask Ventilation:  Easy mask    Attempts:  1    Attempted By:  CRNA    Difficult Airway Encountered?: No      Complications:  None    Airway Device:  Supraglottic airway/LMA    Airway Device Size:  4.0    Style/Cuff Inflation:  Cuffed (inflated to minimal occlusive pressure)    Placement Verified By:  Capnometry    Complicating Factors:  None    Findings Post-Intubation:  BS equal bilateral and atraumatic/condition of teeth unchanged

## 2025-04-01 NOTE — PLAN OF CARE
Problem: Adult Inpatient Plan of Care  Goal: Plan of Care Review  Outcome: Progressing  Goal: Patient-Specific Goal (Individualized)  Outcome: Progressing  Goal: Optimal Comfort and Wellbeing  Outcome: Progressing     Problem: Diabetes Comorbidity  Goal: Blood Glucose Level Within Targeted Range  Outcome: Progressing   POC has been reviewed with pt.  Pt was admitted from [facility] for an obstructing kidney stone in the R ureter.  Went to surgery with Dr. Trotter today to have a cystoscopy done with R stent placement.  Pt is to have lithotripsy done at some point next.  Blood glucose has remained within limits without insulin coverage.  Pain has been controlled with current pain regimen.  No falls during this shift.  Pt is on IV Fluids.  No swartz in place.  No possible discharge at this time.

## 2025-04-01 NOTE — ED NOTES
Report given to Maryjane at [facility], pt transporting via [facility] to room 419 at [facility]. Pt leaving with PIV in. Pt updating spouse on transfer.

## 2025-04-01 NOTE — SUBJECTIVE & OBJECTIVE
Past Medical History:   Diagnosis Date    AK (actinic keratosis)     Anxiety     Benign hypertension     Diabetes mellitus, type 2     Diverticulitis     Fatty liver     GERD (gastroesophageal reflux disease)     Hyperlipidemia     ITP (idiopathic thrombocytopenic purpura)     Kidney stones     Occlusive coronary artery disease        Past Surgical History:   Procedure Laterality Date    COLONOSCOPY  04/11/2016    Dr. Abbott, multiple polyps, recheck 6 month    CORONARY ARTERY BYPASS GRAFT      EYE SURGERY      SPLENECTOMY, TOTAL         Review of patient's allergies indicates:   Allergen Reactions    Dapagliflozin Other (See Comments)     myalgia    Niacin        No current facility-administered medications on file prior to encounter.     Current Outpatient Medications on File Prior to Encounter   Medication Sig    aspirin (ECOTRIN) 81 MG EC tablet Take 81 mg by mouth once daily.    atorvastatin (LIPITOR) 40 MG tablet Take 1 tablet (40 mg total) by mouth every evening.    lisinopriL 10 MG tablet Take 10 mg by mouth once daily.    metFORMIN (GLUCOPHAGE) 1000 MG tablet Take 1,000 mg by mouth 2 (two) times daily.    metoprolol succinate (TOPROL-XL) 25 MG 24 hr tablet Take 1 tablet by mouth once daily.    montelukast (SINGULAIR) 10 mg tablet Take 10 mg by mouth every evening.    phenazopyridine (PYRIDIUM) 95 MG tablet Take 95 mg by mouth 3 (three) times daily as needed for Pain.    [DISCONTINUED] fluticasone propionate (FLONASE) 50 mcg/actuation nasal spray     [DISCONTINUED] ipratropium (ATROVENT) 42 mcg (0.06 %) nasal spray USE 2 SPRAY(S) IN EACH NOSTRIL THREE TIMES DAILY AS NEEDED FOR UP TO 30 DAYS    [DISCONTINUED] JARDIANCE 25 mg tablet Take 25 mg by mouth.     Family History       Problem Relation (Age of Onset)    Cancer Paternal Aunt, Paternal Uncle    Diabetes Mother, Brother, Maternal Aunt, Maternal Grandmother    Heart disease Brother, Maternal Uncle, Paternal Uncle, Maternal Grandfather, Paternal  Grandfather    Hyperlipidemia Father    Hypertension Mother, Father    No Known Problems Son    Skin cancer Mother          Tobacco Use    Smoking status: Former     Current packs/day: 0.00     Types: Cigarettes     Quit date: 10/9/2010     Years since quittin.4    Smokeless tobacco: Never   Substance and Sexual Activity    Alcohol use: Yes     Comment: occasionally    Drug use: No    Sexual activity: Not on file     Review of Systems   Constitutional:  Positive for chills. Negative for fatigue and fever.   HENT:  Negative for congestion, ear pain, sinus pressure and sore throat.    Eyes:  Negative for redness and itching.   Respiratory:  Negative for cough, chest tightness and shortness of breath.    Cardiovascular:  Negative for chest pain, palpitations and leg swelling.   Gastrointestinal:  Positive for nausea. Negative for abdominal pain, constipation, diarrhea and vomiting.   Endocrine: Negative for polydipsia, polyphagia and polyuria.   Genitourinary:  Positive for dysuria and flank pain. Negative for frequency, hematuria and urgency.   Musculoskeletal:  Positive for back pain. Negative for joint swelling and myalgias.   Skin:  Negative for pallor, rash and wound.   Neurological:  Negative for dizziness, syncope, weakness, light-headedness, numbness and headaches.   Hematological:  Negative for adenopathy. Does not bruise/bleed easily.   Psychiatric/Behavioral:  Negative for agitation, hallucinations and suicidal ideas. The patient is not nervous/anxious.    All other systems reviewed and are negative.    Objective:     Vital Signs (Most Recent):  Temp: 98.2 °F (36.8 °C) (25 1402)  Pulse: 61 (25 1402)  Resp: 16 (25 1402)  BP: 115/67 (25 1402)  SpO2: (!) 90 % (25 1402) Vital Signs (24h Range):  Temp:  [98.1 °F (36.7 °C)-98.5 °F (36.9 °C)] 98.2 °F (36.8 °C)  Pulse:  [55-88] 61  Resp:  [16-20] 16  SpO2:  [90 %-96 %] 90 %  BP: ()/() 115/67     Weight: 77.1 kg (170  lb)  Body mass index is 32.12 kg/m².     Physical Exam  Vitals and nursing note reviewed.   Constitutional:       General: He is not in acute distress.     Appearance: He is obese. He is not toxic-appearing.   HENT:      Head: Atraumatic.      Mouth/Throat:      Mouth: Mucous membranes are moist.      Pharynx: Oropharynx is clear.   Eyes:      General: No scleral icterus.     Conjunctiva/sclera: Conjunctivae normal.      Pupils: Pupils are equal, round, and reactive to light.   Cardiovascular:      Rate and Rhythm: Normal rate and regular rhythm.      Heart sounds: No murmur heard.  Pulmonary:      Effort: No respiratory distress.      Breath sounds: No wheezing, rhonchi or rales.   Abdominal:      General: Abdomen is flat. Bowel sounds are normal.      Palpations: Abdomen is soft.   Musculoskeletal:         General: No swelling or deformity.      Cervical back: No rigidity or tenderness.   Skin:     Coloration: Skin is not jaundiced or pale.      Findings: No bruising, erythema or rash.   Neurological:      General: No focal deficit present.      Mental Status: He is alert and oriented to person, place, and time.      Cranial Nerves: No cranial nerve deficit.      Sensory: No sensory deficit.      Motor: No weakness.   Psychiatric:         Mood and Affect: Mood normal.         Behavior: Behavior normal.              CRANIAL NERVES     CN III, IV, VI   Pupils are equal, round, and reactive to light.       Significant Labs: All pertinent labs within the past 24 hours have been reviewed.  CBC:   Recent Labs   Lab 04/01/25  0511   WBC 10.08   HGB 17.6   HCT 50.1        CMP:   Recent Labs   Lab 04/01/25  0511      K 3.9   CO2 27   BUN 19   CREATININE 1.1   CALCIUM 9.1   ALBUMIN 4.3   BILITOT 1.1*   ALKPHOS 72   AST 22   ALT 27       Significant Imaging: I have reviewed all pertinent imaging results/findings within the past 24 hours.

## 2025-04-01 NOTE — ASSESSMENT & PLAN NOTE
Right side hydronephrosis   CT abdomen showed 8 x 8 mm stone at [facility]   Urology consulted, stent placement today   IVF   Analgesics

## 2025-04-01 NOTE — OP NOTE
[facility]  Operative Note    Date: 04/01/2025    Pre-Op Diagnosis: Obstructing right urolithiasis    Post-Op Diagnosis: Same    Procedure(s) Performed:   1. Cystourethroscopy  2. Right retrograde pyelogram  3. Right ureteral stent placement, 6 x 24 JJ stent      Fluoro < 1 hour    Specimen(s): None    Staff Surgeon: Dara Trotter MD    Assistant Surgeon: Dara Trotter Jr, MD    Anesthesia: General    Indications: Aleksandra Keys is a 69 y.o. male with an obstructing 8 mm right [facility] stone.     Findings: Successful right stent placement. Retrograde with moderate hydro.     Estimated Blood Loss: Minimal    Drains: 6 x 24 cm Jj stent    Procedure in Detail:  After risks, benefits and possible complications of cystoscopy were explained, the patient elected to undergo the procedure and informed consent was obtained. All questions were answered in the willian-operative area. The patient was transferred to the cystoscopy suite and placed in the supine position.  SCDs were applied and working.  General anesthesia was administered.  Once the patient was adequately sedated, he was placed in the dorsal lithotomy position and prepped and draped in the usual sterile fashion.  Time out was performed, willian-procedural antibiotics were confirmed.     A rigid cystoscope in a 22 Fr sheath was introduced into the bladder per urethra. This passed easily.  The entire urethra was visualized which showed no masses or strictures.  The right and left ureteral orifices were identified in the normal anatomic position and were seen effluxing clear urine.  Formal cystoscopy was performed which revealed no masses or lesions suspicious for malignancy, mild trabeculations, no bladder stones and no bladder diverticula.     The right ureter was then cannulated with a 5F open ended catheter and a retrograde pyelogram performed revealing moderate hydronephrosis. The sensor wire was placed into the right kidney and confirmed on fluoroscopy. The 6 x 24 cm  stent was placed over the wire into the ureter. The pusher was used to advance the stent. Once in the appropriate position the wire was removed and there was a proximal and distal curl confirmed on fluoroscopy. The bladder was drained, and the patient was removed from lithotomy.      The patient tolerated the procedure well and was transferred to recovery in stable condition.      Disposition:  Floor with [facility] medicine. F/u with Dr. Luke in 2 - 3 weeks for right ureteroscopy.       Dara Trotter Jr, MD

## 2025-04-01 NOTE — CONSULTS
[facility] New Patient/H&P and Consult Note:    Aleksandra Keys is a 69 y.o. male who presents for obstructing right urolithiasis.    Patient with a history of HTN, DM, diverticulitis, HLD, ITP and kidney stones who presented to the ED on 4/1/25 with severe right flank pain for 3 days.     CT abdomen pelvis with contrast with an 8 mm right UPJ stone with mild right hydronephrosis. Urology consulted to assist with management. Urine with positive nitrite, >100 RBC, 20 WBC.     He reports a history of stones s/p ESWL x 2 in the past.     Denies any fever, chills, gross hematuria, bone pain, unintentional weight loss,  trauma or history of  malignancy.       PSA  1.5 10/3/23      Past Medical History:   Diagnosis Date    AK (actinic keratosis)     Anxiety     Benign hypertension     Diabetes mellitus, type 2     Diverticulitis     Fatty liver     GERD (gastroesophageal reflux disease)     Hyperlipidemia     ITP (idiopathic thrombocytopenic purpura)     Kidney stones     Occlusive coronary artery disease        Past Surgical History:   Procedure Laterality Date    COLONOSCOPY  04/11/2016    Dr. Abbott, multiple polyps, recheck 6 month    CORONARY ARTERY BYPASS GRAFT      EYE SURGERY      SPLENECTOMY, TOTAL         Family History   Problem Relation Name Age of Onset    Diabetes Mother      Hypertension Mother      Skin cancer Mother      Diabetes Brother      Heart disease Brother      Hyperlipidemia Father      Hypertension Father      Diabetes Maternal Aunt      Heart disease Maternal Uncle      Cancer Paternal Aunt          thyroid    Heart disease Paternal Uncle      Cancer Paternal Uncle          lung    Diabetes Maternal Grandmother      Heart disease Maternal Grandfather      Heart disease Paternal Grandfather      No Known Problems Son      Melanoma Neg Hx      Psoriasis Neg Hx      Lupus Neg Hx      Eczema Neg Hx         Review of patient's allergies indicates:   Allergen Reactions     Dapagliflozin Other (See Comments)     myalgia    Niacin        Medications Reviewed: see MAR      FOCUSED PHYSICAL EXAM:    Vitals:    04/01/25 1402   BP: 115/67   Pulse: 61   Resp: 16   Temp: 98.2 °F (36.8 °C)     Body mass index is 32.12 kg/m².       General: Alert, cooperative, no distress, appears stated age  Abdomen: Soft, non-tender, no CVA tenderness, non-distended        LABS:    Recent Results (from the past 2 weeks)   EKG 12-lead    Collection Time: 04/01/25  5:04 AM   Result Value Ref Range    QRS Duration 90 ms    [facility] QTC Calculation 464 ms   Comp. Metabolic Panel    Collection Time: 04/01/25  5:11 AM   Result Value Ref Range    Sodium 136 136 - 145 mmol/L    Potassium 3.9 3.5 - 5.1 mmol/L    Chloride 101 95 - 110 mmol/L    CO2 27 23 - 29 mmol/L    Glucose 195 (H) 70 - 110 mg/dL    BUN 19 8 - 23 mg/dL    Creatinine 1.1 0.5 - 1.4 mg/dL    Calcium 9.1 8.7 - 10.5 mg/dL    Protein Total 7.1 6.0 - 8.4 gm/dL    Albumin 4.3 3.5 - 5.2 g/dL    Bilirubin Total 1.1 (H) 0.1 - 1.0 mg/dL    ALP 72 55 - 135 unit/L    AST 22 10 - 40 unit/L    ALT 27 10 - 44 unit/L    Anion Gap 8 8 - 16 mmol/L    eGFR >60 >60 mL/min/1.73/m2   Lipase    Collection Time: 04/01/25  5:11 AM   Result Value Ref Range    Lipase Level 63 (H) 4 - 60 U/L   Urinalysis, Reflex to Urine Culture    Collection Time: 04/01/25  5:11 AM    Specimen: Urine   Result Value Ref Range    Color, UA Orange (A) Yellow, Straw, Amy    Appearance, UA Hazy (A) Clear    Spec Grav UA >=1.030 (A) 1.005 - 1.030    pH, UA 6.0 5.0 - 8.0    Protein, UA 1+ (A) Negative    Glucose, UA 3+ (A) Negative    Ketones, UA Negative Negative    Blood, UA 3+ (A) Negative    Bilirubin, UA Negative Negative    Urobilinogen, UA Negative <2.0 EU/dL    Nitrites, UA Positive (A) Negative    Leukocyte Esterase, UA Negative Negative   CBC with Differential    Collection Time: 04/01/25  5:11 AM   Result Value Ref Range    WBC 10.08 3.90 - 12.70 K/uL    RBC 5.08 4.60 - 6.20 M/uL    Hgb 17.6  14.0 - 18.0 gm/dL    Hct 50.1 40.0 - 54.0 %    MCV 99 (H) 82 - 98 fL    MCH 34.6 (H) 27.0 - 31.0 pg    MCHC 35.1 32.0 - 36.0 g/dL    RDW 11.8 11.5 - 14.5 %    Platelet Count 192 150 - 450 K/uL    MPV 10.8 9.2 - 12.9 fL    Nucleated RBC 0 <=0 /100 WBC    Neut % 44.6 38 - 73 %    Lymph % 40.4 18 - 48 %    Mono % 11.3 4 - 15 %    Eos % 2.7 0 - 8 %    Basophil % 0.7 <=1.9 %    Imm Grans % 0.3 0.0 - 0.5 %    Neut # 4.5 1.8 - 7.7 K/uL    Lymph # 4.07 1 - 4.8 K/uL    Mono # 1.14 (H) 0.3 - 1 K/uL    Eos # 0.27 <=0.5 K/uL    Baso # 0.07 <=0.2 K/uL    Imm Grans # 0.03 0.00 - 0.04 K/uL   Urinalysis Microscopic    Collection Time: 04/01/25  5:11 AM   Result Value Ref Range    RBC, UA >100 (H) <=4 /HPF    WBC, UA 20 (H) <=5 /HPF    Microscopic Comment     ISTAT Lactate    Collection Time: 04/01/25  6:32 AM   Result Value Ref Range    POC Lactate 1.42 0.5 - 2.2 mmol/L    Sample VENOUS            Assessment/Diagnosis:    Obstructing right urolithiasis - 8 mm  Urinary tract infection    Plans:    - Discussed the etiology and management of the patient's nephrolithiasis. Explained that stone disease is multifactorial and can be secondary to urinary obstruction, urine composition, low urine volume, diet, hypokalemia, DM, hypertension, gout, RTA, UTI and medications. We discussed that stones can be managed with observation, trial of passage, ureteroscopy with laser lithotripsy, ESWL and PCNL. After extensive discussion, patient has decided to proceed with urgent right stent placement today at [facility]. All risks, benefits and alternatives discussed at length with patient.

## 2025-04-01 NOTE — H&P
DR CASTANEDA STENTED PT  I WILL SCHEDULE HIS FU STONE EXTRACTION            Urine history:   4/1/25  Pending, nit+/3+bld, >100 rbc/0 wbc  9/6/22 Tr leuk      PSA history:   Lab Results   Component Value Date    PSA 2.1 11/20/2021    PSA 1.6 06/27/2016    PSA 1.1 08/11/2015         Current REVIEW OF SYSTEMS:  Negative for the remaining 12 points of ROS except for as stated above       PMHx:  Past Medical History:   Diagnosis Date    AK (actinic keratosis)     Anxiety     Benign hypertension     Diabetes mellitus, type 2     Diverticulitis     Fatty liver     GERD (gastroesophageal reflux disease)     Hyperlipidemia     ITP (idiopathic thrombocytopenic purpura)     Kidney stones     Occlusive coronary artery disease        PSHx:  Past Surgical History:   Procedure Laterality Date    COLONOSCOPY  04/11/2016    Dr. Abbott, multiple polyps, recheck 6 month    CORONARY ARTERY BYPASS GRAFT      EYE SURGERY      SPLENECTOMY, TOTAL         Fam Hx:  Reviewed- pertinent family hx as above    Soc Hx:  Social History[1]    Allergies:  Dapagliflozin and Niacin    Anticoagulation/Aspirin:     Objective:     Vitals:    04/01/25 1030   BP: 109/66   Pulse: (!) 59   Resp:    Temp: 98.5 °F (36.9 °C)     Recent Labs   Lab 04/11/23  0851 04/25/24  0740 04/01/25  0511   WBC 7.41  --  10.08   White Blood Cell Count  --  8.7  --    Hgb  --   --  17.6   Hemoglobin 17.2 17.7  --    Hematocrit 48.9 52.2 H  --    Hct  --   --  50.1   Platelet Count  --   --  192   Platelets 184 192  --    ]  Recent Labs   Lab 05/13/22  0805 09/06/22  1230 09/07/22  0342 09/07/22  1140 09/08/22  0533 04/11/23  0851 10/25/24  1017 04/01/25  0511   Sodium 139 137 138  --  140 136 139 136   Potassium 5.0 5.0 3.6 4.1 4.3 4.0 4.6 3.9   Chloride 103 102 108  --  109 104  --   --    CO2 25 26 25  --  24 23  --  27   Carbon Dioxide  --   --   --   --   --   --  27  --    BUN 13 14 12  --  8 13  --  19   Blood Urea Nitrogen  --   --   --   --   --   --  17  --     Creatinine 1.1 1.0 0.9  --  1.0 1.1 0.89 1.1   Glucose 207 H 139 H 138 H  --  147 H 268 H 150 H  --    Calcium 9.3 9.5 7.8 L  --  8.2 L 8.7 9.2 9.1   Magnesium 2.0 1.8 1.5 L 2.6 2.2 1.8  --   --    Alkaline Phosphatase 83 61  --   --   --  95 77  --    ALP  --   --   --   --   --   --   --  72   Total Protein 7.1 7.4  --   --   --  6.9  --   --    Albumin 3.8 4.3  --   --   --  3.9  --  4.3   Albumin Level  --   --   --   --   --   --  4.5  --    Total Bilirubin 0.9 1.2 H  --   --   --  1.1 H 1.0  --    Bilirubin Total  --   --   --   --   --   --   --  1.1 H   AST 25 35  --   --   --  18 18 22   ALT 35 28  --   --   --  29 26 27   ]    Lab Results   Component Value Date    HGBA1C 6.7 (H) 10/25/2024            [1]   Social History  Tobacco Use    Smoking status: Former     Current packs/day: 0.00     Types: Cigarettes     Quit date: 10/9/2010     Years since quittin.4    Smokeless tobacco: Never   Substance Use Topics    Alcohol use: Yes     Comment: occasionally    Drug use: No

## 2025-04-01 NOTE — ASSESSMENT & PLAN NOTE
Previous urine culture from years ago shows enterococcus   Follow blood and urine culture   Cont cefepime for now

## 2025-04-01 NOTE — DISCHARGE INSTRUCTIONS
LEAVE AT TOP OF DISCHARGE INSTRUCTIONS    VERY IMPORTANT INSTRUCTIONS FROM  REGARDING YOUR PROCEDURE- PLEASE READ    Your urologist for your next procedure is Dr.Jennifer Luke,  placed the stent on 4/1/25  Office number: [phone] ([facility])  Address: [address] [place] [address],  (2nd office building on left); [address] (located on 2nd floor).     The following are specific instructions for Aleksandra Keys is a 69 y.o. male, so please read CAREFULLY:    If you are on blood thinners and are unsure whether to continue, stop or restart them and it is not mentioned above, then call 's office for further directions    Return on wed April 23rd for stone extraction. See below for instructions  You will need a urine sample 10days before  Urology clinic will schedule this for you    You have a ureteral stent in your right collecting system that was placed on 04/01/2025  -the stent can only remain for a maximum of 3 to 6 months. If the stent remains longer than this you can get recurrent urinary tract infections, the stent can have more stones form along it and urine will not be able to drain and you will lose all function of your kidney requiring a major surgery and a big incision to remove the kidney.  The ureter is the tube that drains the kidney (where urine is made). It connects the kidney to the bladder (where urine is stored).   A ureteral stent is a is a soft plastic tube with holes in tube that bypasses anything that obstructs (stone, tumor, scar tissue) the urine from flow from the kidney to the bladder. It is placed by going through the urethra and into the bladder. No incisions are made. Its temporarily inserted into a ureter to help drain urine into the bladder. One end goes in the kidney. The other end goes in the bladder. A coil on each end holds the stent in place. The stent cant be seen from outside the  body.          You have a stent that was placed for a stone that was obstructing your urine draining from your kidney. Since the stone(s) is/are still there and the ureteral stent was only placed to bypass the stone to drain the urine,  then YOU STILL NEED YOUR STENT AND STONE(S) OUT. Please read the following for details.  you will return in 2-4 weeks to have the stone removed with smaller instruments. This is called ureteroscopy and it will be done while you are asleep (under anesthesia).  currently your scheduled date for ureteroscopy and stone extraction is listed above. However if you were not given a date, then please call our office at [phone] and let them know you still have a stent and a stone and need to know what happens next. Remember the stent cannot stay in indefinitely.  10 to 14 days prior to your return procedure we may have you come in for a nurse visit or lab visit to give a urine sample to confirm no infection.  Our nurse should contact you for this or you should already have an appointment (see above). If you do not, call the office unless  told you you would not need to provide a sample. There are a few occassions were this is not required but call the clinic to confirm if no appointment made.   If you develop increasing flank/kidney pain, bloody urine, burning with urination (more than you have been experiencing) or fever then contact the office to  provide sample and let  know because if you have an infection, your infection will need to be treated and your procedure postponed or you can become septic after the procedure.   You will likely not see  again until the time of your next procedure. However, if you have any questions, please feel free to contact our office and the nurse will be able to leave a message for the doctor to answer any questions you may have.  will place another stent after the stone extraction/ureteroscopy that will need to  be removed a few days to weeks later depending on how much inflammation there is during the procedure.  A new stent is placed because of inflammation from the procedure allowing the urine to drain for few days as about heals.  This new stent will hopefully be able to be removed within a few days.     URETEROSCOPY WITH LASER LITHOTRIPSY AND BASKET STONE EXTRACTION WITH A URETEROSCOPE          If you do not receive a follow-up date or further instructions on what is to be done next about the stent, it is your responsibility to make sure that you have follow-up to have your stone or stent removed.     A stent CANNOT remain indefinitely in the kidney. It should not remain if possible more than 3 to 6 months maximum (rarely 1 year if it was placed for cancer).     If you do not follow-up to have your stent removed then you can LOSE YOUR KIDNEY FUNCTION ON THAT SIDE, develop RECURRENT URINARY TRACT INFECTIONS and MORE STONES requiring SURGICAL OPEN removal of your kidney.    You can call our office ([facility] at [phone] and let them know a stent was placed and you need further instructions for follow-up). If there are issues with insurance we will work with you to help you arrange follow-up where it can be removed. Again,  A STENT CANNOT remain indefinitely. Y      A ureteral stent is left in place for many reasons:  To keep the ureter open after a procedure as there will be much inflammation and if no stent is left you will experience the same pain as having the stone that caused the obstruction.   To drain the kidney of infection.  If the stone is up high and closer to the kideny, if the stone is stuck, or you have an infection, the stent will stretch open the small ureter (the tube that drains the kidney) for a few weeks (usually 2 to 4 weeks max) so that we can return, remove the old stent, place instruments into the ureter or kidney to break up and remove the stone. Sometimes you may need  another stent after this procedure.     Ureteral Stent Symptoms can include but are not limited to   Stabbing pain in the kidney or bladder with urination during or especially at the end of voiding. Usually we write you for flomax/tamsulosin and toradol/ketorolac to help stretch and relax the ureter while you have the stent in place. We cannot write for toradol/ketorolac if you have poor kidney function. Please call if you have not received these prescriptions.  constant urge to urinate, frequency of urination, severe bladder spasms and severe pain the kidney, especially during urination. If this is very bothersome we can write you for ditropan, an overactive bladder medication to take short term to help with the spasms.   blood in the urine, especially with increased activity. This can last the entire time the stent is in, it can sometimes clear and return or you may never have any at all. I recommend increasing fluid intake to prevent large clots that may be difficult to urinate out.      Explanation for medications that may have been written for. See the instructions above to see what you were sent home with  Please  alternate ibuprofen and tylenol for pain.   Toradol/ketorolac 10mg (dispense 10)- this is a stronger form of ibuprofen, you can take up to every 8 hours but stop taking ibuprofen. Too much of this medicine can cause kidney failure or stomach ulcers. This helps with the inflammation the body is experiencing from the stent. Take this with food. If you have kidney disease then do not take this medication, even if it was written for you.  Flomax/Tamsulosin 0.4mg mg (dispense 30) - take once nightly before bedtime (at least 3 hours apart from other high blood pressure medicines) while the stent is in and for 1 week after stent removed to help relax the tube the stent is in. If you are taking amlodipine/norvasc for blood pressure, take at least four hours apart (preferably norvasc in morning and  flomax/tamsulosin after dinner)  Antibiotics - antibiotics were given just before your procedure that will stay in your system for a while.  If you were written for oral antibiotics, take twice daily. Your doctor will specify amount of days to take. If you have another scheduled procedure to have the stone removed more than 2 weeks after the initial procedure, she may ask you to save antibiotics and restart them 3 days before your next procedure.   AZO over the counter -  you can find this at [facility]. Choose one for bladder pain and anti-spasmodic. You can take it up to 3x a day for 3-4 days as long as you have normal kidney function. It may stain your urine and your clothes  Potassium citrate 15meq Twice a day to help increase dietary citrate and prevent stone formation on the stents - expect to see this in the stool, the coating contains the medication.   Ditropan 10mg/Oxybutynin 10mg XL, dispense 30 (take once daily as needed for bladder spasms- can cause constipation, take with stool softeners or fiber gummies)    When to go to ER:   fever >101.5 or inability to urinate (no urination for >4 hours and bladder pain) due to thick clots  If you go to the ER with pain, they may check to make sure the stent is in good position with an x-ray or ultrasound but unlikely to do anything else.   I will let you know when we will plan to have the stent either removed at the ambulatory surgical center as an outpatient procedure while you are awake, which is uncomfortable briefly, but not painful or you will remove it yourself by pulling the strings.    6 Easy Ways to Prevent Kidney Stones - please read!!!   Taken from: https://www.kidney.org/atoz/content/kidneystones_prevent    Don't Underestimate Your Sweat. Saunas, hot yoga and heavy exercise may be good for your health, but they also may lead to kidney stones. Why? Loss of water through sweating - whether due to these activities or just the heat of summer--leads to less urine  "production. The more you sweat, the less you urinate, which allows for stone-causing minerals to settle and bond in the kidneys and urinary tract.  Instead: Hydrate with H2O. One of the best measures you can take to avoid kidney stones is to drink plenty of water, leading you to urinate a lot. So, be sure to keep well hydrated, especially when engaging in exercise or activities that cause a lot of sweating.    It's Not Just the Oxalate. Oxa-what? Oxalate is naturally found in many foods, including fruits and vegetables, nuts and seeds, grains, legumes, and even chocolate and tea. Some examples of foods that contain high levels of oxalate include: peanuts, rhubarb, spinach, beets, chocolate and sweet potatoes. Moderating intake of these foods may be beneficial for people who form calcium oxalate stones, the leading type of kidney stones. A common misconception is that cutting the oxalate-rich foods in your diet alone will reduce the likelihood of forming calcium oxalate kidney stones. While in theory this might be true, this approach isn't smart from an overall health perspective. Most kidney stones are formed when oxalate binds to calcium while urine is produced by the kidneys.  Instead: Eat and drink calcium and oxalate-rich foods together during a meal. In doing so, oxalate and calcium are more likely to bind to one another in the stomach and intestines before the kidneys begin processing, making it less likely that kidney stones will form.    Calcium is Not the Enemy. But it tends to get a bad rap! Most likely due to its name and composition, many are under the impression that calcium is the main culprit in calcium-oxalate stones. "I still see patients who wonder why they are getting recurring stones despite cutting down on their calcium intake," said Dr. Barnhart. "I've even had patients say that their doctors told them to reduce their calcium intake." A diet low in calcium actually increases one's risk of " "developing kidney stones.  Instead: Don't reduce the calcium. Work to cut back on the sodium in your diet and to pair calcium-rich foods with oxalate-rich foods.  It's Not One and Done. Passing a kidney stone is often described as one of the most painful experiences a person can have, but unfortunately, it's not always a one-time event. Studies have shown that having even one stone greatly increases your chances of having another. "Most people will want to do anything they can to ensure it doesn't happen again," said Dr. Barnhart. "Unfortunately, it doesn't seem to be the case that people make the changes they need to after their first stone event." Research conducted by Dr. Barnhart shows that those with kidney stones do not always heed the advice of their nephrologists and urinary specialists. About 15% of kidney stone patients didn't take prescribed medications and 41% did not follow the nutritional advice that would keep stones from recurring.  Instead: Take action! Without the right medications and diet adjustments, stones can come back, and recurring kidney stones also could be an indicator of other problems, including kidney disease.    When Life Hands You Kidney Stones don't fret. And as the saying goes, "make lemonade." It's important to consider dietary remedies alongside prescription medications. While it may seem easier to just take a pill to fix a medical problem, consider what lifestyle changes will also make a big impact on your health.  Instead: Next time you drive past a lemonade (or limeade) stand, consider your kidneys. Chronic kidney stones are often treated with potassium citrate, but studies have shown that limeade, lemonade and other fruits and juices high in natural citrate offers the same stone-preventing benefits. Beware of the sugar, though, because it can increase kidney stone risk.   Instead, buy sugar-free lemonade, or make your own by mixing lime or lemon juice with water and using a " "sugar substitute if needed. "We believe that citrate in the urine may prevent the calcium from binding with other constituents that lead to stones," said Dr. Barnhart. "Also, some evidence suggests that citrate may prevent crystals that are already present from binding with each other, thus preventing them from getting bigger."    Not All Stones are Created Equal. In addition to calcium oxalate stones, another common type of kidney stones is uric acid stones. Red meat, organ meats, and shellfish have high concentrations of a natural chemical compound known as purines. "High purine intake leads to a higher production of uric acid and produces a larger acid load for the kidneys to excrete," said Dr. Barnhart. Higher uric acid excretion leads to lower overall urine pH, which means the urine is more acidic. The high acid concentration of the urine makes it easier for uric acid stones to form.  Instead: To prevent uric acid stones, cut down on high-purine foods such as red meat, organ meats, and shellfish, and follow a healthy diet that contains mostly vegetables and fruits, whole grains, and low fat dairy products. Limit sugar-sweetened foods and drinks, especially those that contain high fructose corn syrup. Limit alcohol because it can increase uric acid levels in the blood and avoid crash diets for the same reason..Eating less animal-based protein and eating more fruits and vegetables will help decrease urine acidity and this will help reduce the chance for stone formation.    Our goal at [facility] is to always give you outstanding care and exceptional service. You may receive a survey from [employer] by mail, text or e-mail in the next 24-48 hours asking about the care you received with us. The survey should only take 5-10 minutes to complete and is very important to us.     Your feedback provides us with a way to recognize our staff who work tirelessly to provide the best care! Also, your responses help us learn how to " improve when your experience was below our aspiration of excellence. We are always looking for ways to improve your stay. We WILL use your feedback to continue making improvements to help us provide the highest quality care. We keep your personal information and feedback confidential. We appreciate your time completing this survey and can't wait to hear from you!!!    We look forward to your continued care with us! Thanks so much for choosing [facility] for your healthcare needs!

## 2025-04-01 NOTE — HPI
Aleksandra Keys is a 69 y.o. male with a past medical history of CAD status post CABG, hyperlipidemia, previous kidney stones, and diverticulitis that presents with 3 days of right-sided flank pain.  Pain initially was in the left side and he thought could potentially have been his diverticulitis.  Pain then migrated over to the right and was back pain radiating to his groin.  Pain is severe and he has difficulty finding comfortable position.  Does endorse nausea without vomiting.  Had difficulty taking medications secondary to nausea and pain.  States that he has low-grade fever, but did not take his temperature.  Denies vomiting, constipation, diarrhea, urinary symptoms, chest pain, and shortness of breath.     In the ED he was afebrile, hemodynamically stable. UA was suggestive of infection. CT abdomen pelvis showed 8 x 8 mm right kidney stone at [facility] with mild right hydronephrosis. Patient was admitted for further management.

## 2025-04-01 NOTE — ANESTHESIA PREPROCEDURE EVALUATION
04/01/2025  Aleksandra Keys is a 69 y.o., male.      Pre-op Assessment    I have reviewed the NPO Status.   I have reviewed the Medications.     Review of Systems  Anesthesia Hx:  No problems with previous Anesthesia                Cardiovascular:  Exercise tolerance: good   Hypertension   CAD                    Coronary Artery Disease:                            Hypertension         Renal/:  Chronic Renal Disease        Kidney Function/Disease             Hepatic/GI:     GERD Liver Disease,        Gerd       Liver Disease        Endocrine:  Diabetes, type 2    Diabetes                    Obesity / BMI > 30      Physical Exam  General: Well nourished    Airway:  Mallampati: II   Mouth Opening: Normal  TM Distance: Normal  Tongue: Normal  Neck ROM: Normal ROM    Dental:  Intact    Chest/Lungs:  Clear to auscultation, Normal Respiratory Rate        Anesthesia Plan  Type of Anesthesia, risks & benefits discussed:    Anesthesia Type: Gen Supraglottic Airway  Intra-op Monitoring Plan: Standard ASA Monitors  Post Op Pain Control Plan: multimodal analgesia and IV/PO Opioids PRN  Induction:  IV  Airway Plan: Direct, Post-Induction  Informed Consent: Informed consent signed with the Patient and all parties understand the risks and agree with anesthesia plan.  All questions answered.   ASA Score: 3    Ready For Surgery From Anesthesia Perspective.     .

## 2025-04-01 NOTE — PLAN OF CARE
Pt prepped for surgery. Consents at bedside. Incentive spirometry taught by respiratory. Pt belongings placed in pacu along with tele box when rolled to OR. Spouse set up with text alerts.

## 2025-04-01 NOTE — ASSESSMENT & PLAN NOTE
"Patient's FSGs are controlled on current medication regimen.  Last A1c reviewed-   Lab Results   Component Value Date    HGBA1C 6.7 (H) 10/25/2024     Most recent fingerstick glucose reviewed- No results for input(s): "POCTGLUCOSE" in the last 24 hours.  Current correctional scale  Low  Maintain anti-hyperglycemic dose as follows-   Antihyperglycemics (From admission, onward)      Start     Stop Route Frequency Ordered    04/01/25 1402  insulin aspart U-100 pen 0-5 Units         -- SubQ Before meals & nightly PRN 04/01/25 1303          Hold Oral hypoglycemics while patient is in the hospital.  "

## 2025-04-02 ENCOUNTER — TELEPHONE (OUTPATIENT)
Dept: PHARMACY | Facility: CLINIC | Age: 70
End: 2025-04-02
Payer: MEDICARE

## 2025-04-02 ENCOUNTER — TELEPHONE (OUTPATIENT)
Dept: UROLOGY | Facility: CLINIC | Age: 70
End: 2025-04-02
Payer: MEDICARE

## 2025-04-02 DIAGNOSIS — N20.0 KIDNEY STONES: Primary | ICD-10-CM

## 2025-04-02 DIAGNOSIS — R82.998 CELLS AND CASTS IN URINE: Primary | ICD-10-CM

## 2025-04-02 LAB
ABSOLUTE EOSINOPHIL (SMH): 0 K/UL
ABSOLUTE MONOCYTE (SMH): 0.67 K/UL (ref 0.3–1)
ABSOLUTE NEUTROPHIL COUNT (SMH): 5.2 K/UL (ref 1.8–7.7)
ALBUMIN SERPL-MCNC: 3.6 G/DL (ref 3.5–5.2)
ALP SERPL-CCNC: 62 UNIT/L (ref 40–150)
ALT SERPL-CCNC: 24 UNIT/L (ref 10–44)
ANION GAP (SMH): 9 MMOL/L (ref 8–16)
AST SERPL-CCNC: 25 UNIT/L (ref 11–45)
BASOPHILS # BLD AUTO: 0.02 K/UL
BASOPHILS NFR BLD AUTO: 0.3 %
BILIRUB SERPL-MCNC: 0.7 MG/DL (ref 0.1–1)
BUN SERPL-MCNC: 17 MG/DL (ref 8–23)
CALCIUM SERPL-MCNC: 8.4 MG/DL (ref 8.7–10.5)
CHLORIDE SERPL-SCNC: 106 MMOL/L (ref 95–110)
CO2 SERPL-SCNC: 22 MMOL/L (ref 23–29)
CREAT SERPL-MCNC: 1 MG/DL (ref 0.5–1.4)
ERYTHROCYTE [DISTWIDTH] IN BLOOD BY AUTOMATED COUNT: 11.9 % (ref 11.5–14.5)
GFR SERPLBLD CREATININE-BSD FMLA CKD-EPI: >60 ML/MIN/1.73/M2
GLUCOSE SERPL-MCNC: 220 MG/DL (ref 70–110)
HCT VFR BLD AUTO: 44.2 % (ref 40–54)
HGB BLD-MCNC: 15.5 GM/DL (ref 14–18)
IMM GRANULOCYTES # BLD AUTO: 0.02 K/UL (ref 0–0.04)
IMM GRANULOCYTES NFR BLD AUTO: 0.3 % (ref 0–0.5)
LYMPHOCYTES # BLD AUTO: 1.83 K/UL (ref 1–4.8)
MAGNESIUM SERPL-MCNC: 1.9 MG/DL (ref 1.6–2.6)
MCH RBC QN AUTO: 34.8 PG (ref 27–31)
MCHC RBC AUTO-ENTMCNC: 35.1 G/DL (ref 32–36)
MCV RBC AUTO: 99 FL (ref 82–98)
NUCLEATED RBC (/100WBC) (SMH): 0 /100 WBC
PLATELET # BLD AUTO: 190 K/UL (ref 150–450)
PMV BLD AUTO: 10.6 FL (ref 9.2–12.9)
POCT GLUCOSE: 173 MG/DL (ref 70–110)
POCT GLUCOSE: 196 MG/DL (ref 70–110)
POCT GLUCOSE: 198 MG/DL (ref 70–110)
POCT GLUCOSE: 209 MG/DL (ref 70–110)
POCT GLUCOSE: 213 MG/DL (ref 70–110)
POTASSIUM SERPL-SCNC: 4.2 MMOL/L (ref 3.5–5.1)
PROT SERPL-MCNC: 6.2 GM/DL (ref 6–8.4)
RBC # BLD AUTO: 4.46 M/UL (ref 4.6–6.2)
RELATIVE EOSINOPHIL (SMH): 0 % (ref 0–8)
RELATIVE LYMPHOCYTE (SMH): 23.7 % (ref 18–48)
RELATIVE MONOCYTE (SMH): 8.7 % (ref 4–15)
RELATIVE NEUTROPHIL (SMH): 67 % (ref 38–73)
SODIUM SERPL-SCNC: 137 MMOL/L (ref 136–145)
WBC # BLD AUTO: 7.72 K/UL (ref 3.9–12.7)

## 2025-04-02 PROCEDURE — 94799 UNLISTED PULMONARY SVC/PX: CPT

## 2025-04-02 PROCEDURE — 36415 COLL VENOUS BLD VENIPUNCTURE: CPT | Performed by: UROLOGY

## 2025-04-02 PROCEDURE — 25000003 PHARM REV CODE 250: Performed by: UROLOGY

## 2025-04-02 PROCEDURE — 63600175 PHARM REV CODE 636 W HCPCS: Performed by: UROLOGY

## 2025-04-02 PROCEDURE — 80053 COMPREHEN METABOLIC PANEL: CPT | Performed by: UROLOGY

## 2025-04-02 PROCEDURE — 99900035 HC TECH TIME PER 15 MIN (STAT)

## 2025-04-02 PROCEDURE — 94761 N-INVAS EAR/PLS OXIMETRY MLT: CPT

## 2025-04-02 PROCEDURE — 11000001 HC ACUTE MED/SURG PRIVATE ROOM

## 2025-04-02 PROCEDURE — 85025 COMPLETE CBC W/AUTO DIFF WBC: CPT | Performed by: UROLOGY

## 2025-04-02 PROCEDURE — 83735 ASSAY OF MAGNESIUM: CPT | Performed by: UROLOGY

## 2025-04-02 PROCEDURE — 63600175 PHARM REV CODE 636 W HCPCS: Mod: JZ | Performed by: INTERNAL MEDICINE

## 2025-04-02 RX ORDER — GENTAMICIN SULFATE 80 MG/100ML
80 INJECTION, SOLUTION INTRAVENOUS
OUTPATIENT
Start: 2025-04-02

## 2025-04-02 RX ADMIN — CEFEPIME 2 G: 2 INJECTION, POWDER, FOR SOLUTION INTRAVENOUS at 01:04

## 2025-04-02 RX ADMIN — INSULIN ASPART 1 UNITS: 100 INJECTION, SOLUTION INTRAVENOUS; SUBCUTANEOUS at 08:04

## 2025-04-02 RX ADMIN — SODIUM CHLORIDE: 9 INJECTION, SOLUTION INTRAVENOUS at 02:04

## 2025-04-02 RX ADMIN — SODIUM CHLORIDE: 9 INJECTION, SOLUTION INTRAVENOUS at 01:04

## 2025-04-02 RX ADMIN — MONTELUKAST 10 MG: 10 TABLET, FILM COATED ORAL at 08:04

## 2025-04-02 RX ADMIN — CEFEPIME 2 G: 2 INJECTION, POWDER, FOR SOLUTION INTRAVENOUS at 02:04

## 2025-04-02 RX ADMIN — ASPIRIN 81 MG: 81 TABLET, COATED ORAL at 09:04

## 2025-04-02 RX ADMIN — ATORVASTATIN CALCIUM 40 MG: 40 TABLET, FILM COATED ORAL at 08:04

## 2025-04-02 RX ADMIN — INSULIN ASPART 1 UNITS: 100 INJECTION, SOLUTION INTRAVENOUS; SUBCUTANEOUS at 03:04

## 2025-04-02 RX ADMIN — HYDROCODONE BITARTRATE AND ACETAMINOPHEN 1 TABLET: 5; 325 TABLET ORAL at 02:04

## 2025-04-02 RX ADMIN — HYDROMORPHONE HYDROCHLORIDE 1 MG: 1 INJECTION, SOLUTION INTRAMUSCULAR; INTRAVENOUS; SUBCUTANEOUS at 03:04

## 2025-04-02 RX ADMIN — HYDROCODONE BITARTRATE AND ACETAMINOPHEN 1 TABLET: 5; 325 TABLET ORAL at 08:04

## 2025-04-02 RX ADMIN — HYDROCODONE BITARTRATE AND ACETAMINOPHEN 1 TABLET: 5; 325 TABLET ORAL at 07:04

## 2025-04-02 RX ADMIN — ENOXAPARIN SODIUM 40 MG: 40 INJECTION SUBCUTANEOUS at 04:04

## 2025-04-02 NOTE — ASSESSMENT & PLAN NOTE
Patient's FSGs are controlled on current medication regimen.  Last A1c reviewed-   Lab Results   Component Value Date    HGBA1C 6.7 (H) 10/25/2024     Most recent fingerstick glucose reviewed-   Recent Labs   Lab 04/01/25  1957 04/02/25  0352 04/02/25  0720 04/02/25  1133   POCTGLUCOSE 255* 213* 173* 198*     Current correctional scale  Low  Maintain anti-hyperglycemic dose as follows-   Antihyperglycemics (From admission, onward)      Start     Stop Route Frequency Ordered    04/01/25 1402  insulin aspart U-100 pen 0-5 Units         -- SubQ Before meals & nightly PRN 04/01/25 1303          Hold Oral hypoglycemics while patient is in the hospital.

## 2025-04-02 NOTE — TELEPHONE ENCOUNTER
----- Message from Yoni Read sent at 4/2/2025  2:14 PM CDT -----  Regarding: FW: Order for KARINA ONEILL    ----- Message -----  From: Criss Savage RN  Sent: 4/2/2025   1:26 PM CDT  To: Pharmacy Patient Assistance Team  Subject: Order for KARINA ONEILL

## 2025-04-02 NOTE — SUBJECTIVE & OBJECTIVE
Interval History: Patient continue to complain of episodic right flank pain, now radiating to the left. Patient is afebrile. Also has dysuria.     Review of Systems  Objective:     Vital Signs (Most Recent):  Temp: 98.2 °F (36.8 °C) (04/02/25 1146)  Pulse: 71 (04/02/25 1146)  Resp: 16 (04/02/25 1146)  BP: (!) 109/59 (04/02/25 1146)  SpO2: 95 % (04/02/25 1146) Vital Signs (24h Range):  Temp:  [98 °F (36.7 °C)-98.9 °F (37.2 °C)] 98.2 °F (36.8 °C)  Pulse:  [61-80] 71  Resp:  [13-19] 16  SpO2:  [90 %-98 %] 95 %  BP: ()/(53-67) 109/59     Weight: 77.1 kg (170 lb)  Body mass index is 32.12 kg/m².    Intake/Output Summary (Last 24 hours) at 4/2/2025 1316  Last data filed at 4/2/2025 0728  Gross per 24 hour   Intake 2463.62 ml   Output 900 ml   Net 1563.62 ml         Physical Exam  Vitals and nursing note reviewed.   Constitutional:       General: He is not in acute distress.     Appearance: He is obese. He is not toxic-appearing.   HENT:      Head: Atraumatic.      Mouth/Throat:      Mouth: Mucous membranes are moist.      Pharynx: Oropharynx is clear.   Eyes:      General: No scleral icterus.     Conjunctiva/sclera: Conjunctivae normal.      Pupils: Pupils are equal, round, and reactive to light.   Cardiovascular:      Rate and Rhythm: Normal rate and regular rhythm.      Heart sounds: No murmur heard.  Pulmonary:      Effort: No respiratory distress.      Breath sounds: No wheezing, rhonchi or rales.   Abdominal:      General: Abdomen is flat. Bowel sounds are normal.      Palpations: Abdomen is soft.   Musculoskeletal:         General: No swelling or deformity.      Cervical back: No rigidity or tenderness.   Skin:     Coloration: Skin is not jaundiced or pale.      Findings: No bruising, erythema or rash.   Neurological:      General: No focal deficit present.      Mental Status: He is alert and oriented to person, place, and time.      Cranial Nerves: No cranial nerve deficit.      Sensory: No sensory deficit.       Motor: No weakness.   Psychiatric:         Mood and Affect: Mood normal.         Behavior: Behavior normal.       Significant Labs: All pertinent labs within the past 24 hours have been reviewed.  CBC:   Recent Labs   Lab 04/01/25  0511 04/02/25  0431   WBC 10.08 7.72   HGB 17.6 15.5   HCT 50.1 44.2    190     CMP:   Recent Labs   Lab 04/01/25  0511 04/02/25  0431    137   K 3.9 4.2   CO2 27 22*   BUN 19 17   CREATININE 1.1 1.0   CALCIUM 9.1 8.4*   ALBUMIN 4.3 3.6   BILITOT 1.1* 0.7   ALKPHOS 72 62   AST 22 25   ALT 27 24       Significant Imaging: I have reviewed all pertinent imaging results/findings within the past 24 hours.

## 2025-04-02 NOTE — PLAN OF CARE
Problem: Adult Inpatient Plan of Care  Goal: Plan of Care Review  Outcome: Progressing  Goal: Patient-Specific Goal (Individualized)  Outcome: Progressing  Goal: Absence of Hospital-Acquired Illness or Injury  Outcome: Progressing  Goal: Optimal Comfort and Wellbeing  Outcome: Progressing  Goal: Readiness for Transition of Care  Outcome: Progressing     Problem: Diabetes Comorbidity  Goal: Blood Glucose Level Within Targeted Range  Outcome: Progressing     Problem: Pain Acute  Goal: Optimal Pain Control and Function  Outcome: Progressing     Problem: Fall Injury Risk  Goal: Absence of Fall and Fall-Related Injury  Outcome: Progressing     Problem: UTI (Urinary Tract Infection)  Goal: Improved Infection Symptoms  Outcome: Progressing     POC reviewed with patient. Verbalized understanding. Pain controlled with oral medication this shift. Continues with burning on urination, but voiding without difficulty in urinal. IV patent infusing NS without difficulty. Urine continues dark (red/brown). BG controlled. Plan is to discharge to home tomorrow.

## 2025-04-02 NOTE — CARE UPDATE
04/02/25 0815   Patient Assessment/Suction   Level of Consciousness (AVPU) alert   Respiratory Effort Normal;Unlabored   PRE-TX-O2   Device (Oxygen Therapy) room air   SpO2 95 %   Pulse Oximetry Type Intermittent   $ Pulse Oximetry - Multiple Charge Pulse Oximetry - Multiple   Pulse 80   Resp 18   Positioning HOB elevated 30 degrees

## 2025-04-02 NOTE — ASSESSMENT & PLAN NOTE
Right side hydronephrosis   CT abdomen showed 8 x 8 mm stone at [facility]   Urology consulted, s/p stent placement 4/1   IVF   Analgesics

## 2025-04-02 NOTE — PLAN OF CARE
POC reviewed with pt. Pt verbalized understanding.    Pain mgmt controlled with PRN medications as ordered.  P/O day 0 ureteral drain/right stent placement.  Pt is tolerating IV ABX as ordered with no adverse reactions noted.  Pt is able to ambulate independently in room and to bathroom. Uses urinal while in bed.  Pt continues with 20g right upper arm infusing NS @100mL/hr, Lovenox, regular diet, accuchecks, tele [id number], and routine labs as ordered.    All fall precautions maintained. Frequent checks performed per protocol. Bed in lowest position, call light within reach, slip resistant socks maintained. Bed alarm on/functioning. POC ongoing.       Problem: Adult Inpatient Plan of Care  Goal: Plan of Care Review  Outcome: Progressing  Goal: Patient-Specific Goal (Individualized)  Outcome: Progressing  Goal: Absence of Hospital-Acquired Illness or Injury  Outcome: Progressing  Goal: Optimal Comfort and Wellbeing  Outcome: Progressing  Goal: Readiness for Transition of Care  Outcome: Progressing     Problem: Diabetes Comorbidity  Goal: Blood Glucose Level Within Targeted Range  Outcome: Progressing     Problem: Pain Acute  Goal: Optimal Pain Control and Function  Outcome: Progressing     Problem: Fall Injury Risk  Goal: Absence of Fall and Fall-Related Injury  Outcome: Progressing     Problem: Infection  Goal: Absence of Infection Signs and Symptoms  Outcome: Progressing     Problem: UTI (Urinary Tract Infection)  Goal: Improved Infection Symptoms  Outcome: Progressing

## 2025-04-02 NOTE — TELEPHONE ENCOUNTER
----- Message from Emilia Luke MD sent at 4/1/2025  5:17 PM CDT -----  Lab ua reflex 10d prior to cysto r urs on wed 4/23

## 2025-04-02 NOTE — LETTER
April 2, 2025    Aleksandra Keys  [address] [place] [address] [place] [address]               Dear Florencia Oroscos,      My name is Patricia Alessandro  I am reaching out on behalf of [facility]s [facility]acy Patient Assistance Team in regards to your request for medication assistance. Our goal is to assist qualified [facility] patients with obtaining financial assistance for prescribed medications.     Please note that enrollment into available support may require the following documents:      Proof of household Income (such as social security award letter, pension statement,1040)  Copy of all insurance cards (front and back)  Print out from your insurance or pharmacy showing how much you have spent on prescriptions for the current year  Completed Medication Access Center Authorization Forms        Please reach out to my phone number below if you are still in need of assistance with your medications. Follow-up call will be made in 5 business days. We look forward to hearing from you soon!    Thank you for choosing [facility] for your healthcare needs      Sincerely  Patricia AUSTIN @[phone]  Pharmacy Patient Assistance Team  [address] [place] [address]  [id number] [place] [address] [place] [address]  Fax: [phone]  Email: [email or web address]

## 2025-04-02 NOTE — PROGRESS NOTES
Procedure Order to Urology [[id number]]    Electronically signed by: Emilia Luke MD on 04/01/25 1716 Status: Active   Ordering user: Emilia Luke MD 04/01/25 1716 Ordering provider: Emilia Luke MD   Authorized by: Emilia Luke MD Ordering mode: Standard   Frequency:  04/01/25 -     Acknowledged: Adrianne Kinsey RN 04/01/25 1730 for Placing Order   Diagnoses  Ureterolithiasis [N20.1]   Questionnaire    Question Answer   Procedure Ureteroscopy Stone Extraction (Remove Calculus Ureter) Comment - 4/23/25, WED, RIGHT RETURN   Facility Name: [facility]   Out pt OR Npo.start IV , General anesthesia, No labs or urine needed 80mg Gentamycin on arrival IV and Rocephin 2 grams Iv on arrival cpt code 68665

## 2025-04-02 NOTE — PLAN OF CARE
PCP apt scheduled and added to AVS       04/02/25 1051   Post-Acute Status   Hospital Resources/Appts/Education Provided Appointments scheduled and added to AVS

## 2025-04-02 NOTE — TELEPHONE ENCOUNTER
Currently unable to assist with PAP enrollment for the following reason(s):      Patient current medication list does not reflect an order for Jardiance. Please update chart to reflect a current order for the requested medication.Your help is greatly appreciated.               Patricia Francois  Pharmacy Patient Assistance Team

## 2025-04-02 NOTE — PROGRESS NOTES
[facility]  Progress Note    Patient Name: Aleksandra Keys  MRN: [id number]  Patient Class: IP- Inpatient   Admission Date: 4/1/2025  Length of Stay: 1 days  Attending Physician: Winston Bruce MD  Primary Care Provider: Fernanda Hermosillo MD        Subjective     Principal Problem:Ureterolithiasis        HPI:  Aleksandra Keys is a 69 y.o. male with a past medical history of CAD status post CABG, hyperlipidemia, previous kidney stones, and diverticulitis that presents with 3 days of right-sided flank pain.  Pain initially was in the left side and he thought could potentially have been his diverticulitis.  Pain then migrated over to the right and was back pain radiating to his groin.  Pain is severe and he has difficulty finding comfortable position.  Does endorse nausea without vomiting.  Had difficulty taking medications secondary to nausea and pain.  States that he has low-grade fever, but did not take his temperature.  Denies vomiting, constipation, diarrhea, urinary symptoms, chest pain, and shortness of breath.     In the ED he was afebrile, hemodynamically stable. UA was suggestive of infection. CT abdomen pelvis showed 8 x 8 mm right kidney stone at [facility] with mild right hydronephrosis. Patient was admitted for further management.     Overview/Hospital Course:  Patient is a 69 year old male with history of diverticulosis, nephrolithiasis, who was admitted with right sided stone at [facility] with UTI and right sided mild hydronephrosis. Patient was seen by nephrology and underwent stent placement. He was started on cefepime and IVF.     Interval History: Patient continue to complain of episodic right flank pain, now radiating to the left. Patient is afebrile. Also has dysuria.     Review of Systems  Objective:     Vital Signs (Most Recent):  Temp: 98.2 °F (36.8 °C) (04/02/25 1146)  Pulse: 71 (04/02/25 1146)  Resp: 16 (04/02/25 1146)  BP: (!) 109/59 (04/02/25  1146)  SpO2: 95 % (04/02/25 1146) Vital Signs (24h Range):  Temp:  [98 °F (36.7 °C)-98.9 °F (37.2 °C)] 98.2 °F (36.8 °C)  Pulse:  [61-80] 71  Resp:  [13-19] 16  SpO2:  [90 %-98 %] 95 %  BP: ()/(53-67) 109/59     Weight: 77.1 kg (170 lb)  Body mass index is 32.12 kg/m².    Intake/Output Summary (Last 24 hours) at 4/2/2025 1316  Last data filed at 4/2/2025 0728  Gross per 24 hour   Intake 2463.62 ml   Output 900 ml   Net 1563.62 ml         Physical Exam  Vitals and nursing note reviewed.   Constitutional:       General: He is not in acute distress.     Appearance: He is obese. He is not toxic-appearing.   HENT:      Head: Atraumatic.      Mouth/Throat:      Mouth: Mucous membranes are moist.      Pharynx: Oropharynx is clear.   Eyes:      General: No scleral icterus.     Conjunctiva/sclera: Conjunctivae normal.      Pupils: Pupils are equal, round, and reactive to light.   Cardiovascular:      Rate and Rhythm: Normal rate and regular rhythm.      Heart sounds: No murmur heard.  Pulmonary:      Effort: No respiratory distress.      Breath sounds: No wheezing, rhonchi or rales.   Abdominal:      General: Abdomen is flat. Bowel sounds are normal.      Palpations: Abdomen is soft.   Musculoskeletal:         General: No swelling or deformity.      Cervical back: No rigidity or tenderness.   Skin:     Coloration: Skin is not jaundiced or pale.      Findings: No bruising, erythema or rash.   Neurological:      General: No focal deficit present.      Mental Status: He is alert and oriented to person, place, and time.      Cranial Nerves: No cranial nerve deficit.      Sensory: No sensory deficit.      Motor: No weakness.   Psychiatric:         Mood and Affect: Mood normal.         Behavior: Behavior normal.       Significant Labs: All pertinent labs within the past 24 hours have been reviewed.  CBC:   Recent Labs   Lab 04/01/25  0511 04/02/25  0431   WBC 10.08 7.72   HGB 17.6 15.5   HCT 50.1 44.2    190     CMP:    Recent Labs   Lab 04/01/25  0511 04/02/25  0431    137   K 3.9 4.2   CO2 27 22*   BUN 19 17   CREATININE 1.1 1.0   CALCIUM 9.1 8.4*   ALBUMIN 4.3 3.6   BILITOT 1.1* 0.7   ALKPHOS 72 62   AST 22 25   ALT 27 24       Significant Imaging: I have reviewed all pertinent imaging results/findings within the past 24 hours.      Assessment & Plan  Ureterolithiasis  Right side hydronephrosis   CT abdomen showed 8 x 8 mm stone at [facility]   Urology consulted, s/p stent placement 4/1   IVF   Analgesics       Hypertension associated with diabetes  Resume home meds     Anxiety  Resume home meds    Hyperlipidemia  Cont statin     GERD (gastroesophageal reflux disease)  Cont PPI    Occlusive coronary artery disease  Cont aspirin, statin   Obesity (BMI 30.0-34.9)  Diet and exercise counseling     Diabetes mellitus type 2, controlled  Patient's FSGs are controlled on current medication regimen.  Last A1c reviewed-   Lab Results   Component Value Date    HGBA1C 6.7 (H) 10/25/2024     Most recent fingerstick glucose reviewed-   Recent Labs   Lab 04/01/25  1957 04/02/25  0352 04/02/25  0720 04/02/25  1133   POCTGLUCOSE 255* 213* 173* 198*     Current correctional scale  Low  Maintain anti-hyperglycemic dose as follows-   Antihyperglycemics (From admission, onward)      Start     Stop Route Frequency Ordered    04/01/25 1402  insulin aspart U-100 pen 0-5 Units         -- SubQ Before meals & nightly PRN 04/01/25 1303          Hold Oral hypoglycemics while patient is in the hospital.  UTI (urinary tract infection)  Previous urine culture from years ago shows enterococcus   Follow blood and urine culture   Cont cefepime for now     VTE Risk Mitigation (From admission, onward)           Ordered     enoxaparin injection 40 mg  Daily         04/01/25 1303     IP VTE HIGH RISK PATIENT  Once         04/01/25 1303     Place sequential compression device  Until discontinued         04/01/25 1303                    Discharge Planning   IRVING:  4/3/2025     Code Status: Full Code   Medical Readiness for Discharge Date:   Discharge Plan A: Home with family                        Winston Bruce MD  Department of Hospital Medicine   [facility]/Surg

## 2025-04-02 NOTE — CARE UPDATE
04/01/25 2040   Patient Assessment/Suction   Level of Consciousness (AVPU)   (asleep)   Respiratory Effort Unlabored   PRE-TX-O2   Device (Oxygen Therapy) room air   SpO2 (!) 91 %   Pulse Oximetry Type Intermittent   $ Pulse Oximetry - Multiple Charge Pulse Oximetry - Multiple   Pulse 65   Resp 17

## 2025-04-02 NOTE — HOSPITAL COURSE
Patient is a 69 year old male with history of diverticulosis, nephrolithiasis, who was admitted with right sided stone at [facility] with UTI and right sided mild hydronephrosis. Patient was seen by nephrology and underwent stent placement. He was started on cefepime and IVF. His urine culture is negative. Pain is controlled with Norco. Given recent procedure, will continue Keflex for 5 more days. Outpatient follow up scheduled with urology.

## 2025-04-02 NOTE — PLAN OF CARE
[facility] - Med/Surg  Initial Discharge Assessment       Primary Care Provider: Fernanda Hermosillo MD    Admission Diagnosis: Ureterolithiasis [N20.1]    Admission Date: 4/1/2025  Expected Discharge Date: 4/3/2025    Transition of Care Barriers: None    Payor: MEDICARE / Plan: MEDICARE PART A & B / Product Type: Government /     Extended Emergency Contact Information  Primary Emergency Contact: MassimoSusan  Address: [address] [place] [address] [facility]  Home Phone: [phone]  Mobile Phone: [phone]  Relation: Spouse  Preferred language: English   needed? No  Secondary Emergency Contact: MassimoAbdiel  Address: [address] [place] [address] [facility]  Home Phone: [phone]  Mobile Phone: [phone]  Relation: Son  Preferred language: English   needed? No    Discharge Plan A: Home with family  Discharge Plan B: Home      [facility] [address] [place], [place] [address] [facility]  [address] [place] [address]  [place] [address]  Phone: [phone] Fax: [phone]      DC assessment completed with patient at bedside. Verified information on facesheet as correct. Verified information on facesheet as correct. Denies POA. PCP is Dr. Stringer, reports last apt was about 6 months ago. Pharmacy is [facility]. Denies hh/hd/dme/blood thinners/outpt services. Independent at baseline. Drives himself to apts. Spouse or son will provide transportation home upon. Reports taking all medications as prescribed except jardiance. Did not start taking due to cost. Prescription coverage is through [employer]. Verified insurance on file. Denies recent inpt stay in last 30 days. Verified insurance on file. DC plan is home.     Initial Assessment (most recent)       Adult Discharge Assessment - 04/02/25 1025          Discharge Assessment    Assessment Type Discharge Planning Assessment     Confirmed/corrected address, phone  number and insurance Yes     Confirmed Demographics Correct on Facesheet     Source of Information patient     Communicated IRVING with patient/caregiver Yes     Reason For Admission ureterolithiasis     People in Home spouse     Facility Arrived From: home     Do you expect to return to your current living situation? Yes     Do you have help at home or someone to help you manage your care at home? Yes     Prior to hospitilization cognitive status: Alert/Oriented     Current cognitive status: Alert/Oriented     Walking or Climbing Stairs Difficulty no     Dressing/Bathing Difficulty no     Equipment Currently Used at Home none     Readmission within 30 days? No     Patient currently being followed by outpatient case management? No     Do you currently have service(s) that help you manage your care at home? No     Do you take prescription medications? Yes     Do you have prescription coverage? Yes     Do you have any problems affording any of your prescribed medications? No     Is the patient taking medications as prescribed? yes     Who is going to help you get home at discharge? spouse or son     How do you get to doctors appointments? car, drives self     Are you on dialysis? No     Do you take coumadin? No     Discharge Plan A Home with family     Discharge Plan B Home     DME Needed Upon Discharge  none     Discharge Plan discussed with: Patient     Transition of Care Barriers None

## 2025-04-03 VITALS
DIASTOLIC BLOOD PRESSURE: 70 MMHG | TEMPERATURE: 98 F | OXYGEN SATURATION: 93 % | SYSTOLIC BLOOD PRESSURE: 131 MMHG | BODY MASS INDEX: 32.1 KG/M2 | HEIGHT: 61 IN | HEART RATE: 79 BPM | RESPIRATION RATE: 17 BRPM | WEIGHT: 170 LBS

## 2025-04-03 LAB
ABSOLUTE EOSINOPHIL (SMH): 0.27 K/UL
ABSOLUTE MONOCYTE (SMH): 0.8 K/UL (ref 0.3–1)
ABSOLUTE NEUTROPHIL COUNT (SMH): 5 K/UL (ref 1.8–7.7)
ALBUMIN SERPL-MCNC: 3.4 G/DL (ref 3.5–5.2)
ALP SERPL-CCNC: 52 UNIT/L (ref 40–150)
ALT SERPL-CCNC: 22 UNIT/L (ref 10–44)
ANION GAP (SMH): 9 MMOL/L (ref 8–16)
AST SERPL-CCNC: 25 UNIT/L (ref 11–45)
BACTERIA UR CULT: NO GROWTH
BASOPHILS # BLD AUTO: 0.08 K/UL
BASOPHILS NFR BLD AUTO: 0.8 %
BILIRUB SERPL-MCNC: 1.2 MG/DL (ref 0.1–1)
BUN SERPL-MCNC: 14 MG/DL (ref 8–23)
CALCIUM SERPL-MCNC: 8.2 MG/DL (ref 8.7–10.5)
CHLORIDE SERPL-SCNC: 106 MMOL/L (ref 95–110)
CO2 SERPL-SCNC: 25 MMOL/L (ref 23–29)
CREAT SERPL-MCNC: 0.8 MG/DL (ref 0.5–1.4)
ERYTHROCYTE [DISTWIDTH] IN BLOOD BY AUTOMATED COUNT: 11.9 % (ref 11.5–14.5)
GFR SERPLBLD CREATININE-BSD FMLA CKD-EPI: >60 ML/MIN/1.73/M2
GLUCOSE SERPL-MCNC: 157 MG/DL (ref 70–110)
HCT VFR BLD AUTO: 42.7 % (ref 40–54)
HGB BLD-MCNC: 15 GM/DL (ref 14–18)
IMM GRANULOCYTES # BLD AUTO: 0.03 K/UL (ref 0–0.04)
IMM GRANULOCYTES NFR BLD AUTO: 0.3 % (ref 0–0.5)
LYMPHOCYTES # BLD AUTO: 3.69 K/UL (ref 1–4.8)
MAGNESIUM SERPL-MCNC: 1.8 MG/DL (ref 1.6–2.6)
MCH RBC QN AUTO: 34.3 PG (ref 27–31)
MCHC RBC AUTO-ENTMCNC: 35.1 G/DL (ref 32–36)
MCV RBC AUTO: 98 FL (ref 82–98)
NUCLEATED RBC (/100WBC) (SMH): 0 /100 WBC
PLATELET # BLD AUTO: 200 K/UL (ref 150–450)
PMV BLD AUTO: 11.4 FL (ref 9.2–12.9)
POCT GLUCOSE: 150 MG/DL (ref 70–110)
POCT GLUCOSE: 198 MG/DL (ref 70–110)
POTASSIUM SERPL-SCNC: 3.7 MMOL/L (ref 3.5–5.1)
PROT SERPL-MCNC: 5.9 GM/DL (ref 6–8.4)
RBC # BLD AUTO: 4.37 M/UL (ref 4.6–6.2)
RELATIVE EOSINOPHIL (SMH): 2.7 % (ref 0–8)
RELATIVE LYMPHOCYTE (SMH): 37.3 % (ref 18–48)
RELATIVE MONOCYTE (SMH): 8.1 % (ref 4–15)
RELATIVE NEUTROPHIL (SMH): 50.8 % (ref 38–73)
SODIUM SERPL-SCNC: 140 MMOL/L (ref 136–145)
WBC # BLD AUTO: 9.9 K/UL (ref 3.9–12.7)

## 2025-04-03 PROCEDURE — 94761 N-INVAS EAR/PLS OXIMETRY MLT: CPT

## 2025-04-03 PROCEDURE — 63600175 PHARM REV CODE 636 W HCPCS: Performed by: UROLOGY

## 2025-04-03 PROCEDURE — 36415 COLL VENOUS BLD VENIPUNCTURE: CPT | Performed by: UROLOGY

## 2025-04-03 PROCEDURE — 85025 COMPLETE CBC W/AUTO DIFF WBC: CPT | Performed by: UROLOGY

## 2025-04-03 PROCEDURE — 63600175 PHARM REV CODE 636 W HCPCS: Performed by: INTERNAL MEDICINE

## 2025-04-03 PROCEDURE — 82040 ASSAY OF SERUM ALBUMIN: CPT | Performed by: UROLOGY

## 2025-04-03 PROCEDURE — 83735 ASSAY OF MAGNESIUM: CPT | Performed by: UROLOGY

## 2025-04-03 PROCEDURE — 94799 UNLISTED PULMONARY SVC/PX: CPT

## 2025-04-03 PROCEDURE — 25000003 PHARM REV CODE 250: Performed by: UROLOGY

## 2025-04-03 RX ORDER — HYDROCODONE BITARTRATE AND ACETAMINOPHEN 5; 325 MG/1; MG/1
1 TABLET ORAL EVERY 6 HOURS PRN
Qty: 15 TABLET | Refills: 0 | Status: SHIPPED | OUTPATIENT
Start: 2025-04-03

## 2025-04-03 RX ORDER — CEPHALEXIN 250 MG/5ML
250 POWDER, FOR SUSPENSION ORAL 4 TIMES DAILY
Qty: 100 ML | Refills: 0 | Status: SHIPPED | OUTPATIENT
Start: 2025-04-03 | End: 2025-04-08

## 2025-04-03 RX ORDER — CEFEPIME HYDROCHLORIDE 2 G/1
2 INJECTION, POWDER, FOR SOLUTION INTRAVENOUS
Status: DISCONTINUED | OUTPATIENT
Start: 2025-04-03 | End: 2025-04-03 | Stop reason: HOSPADM

## 2025-04-03 RX ADMIN — CEFEPIME 2 G: 2 INJECTION, POWDER, FOR SOLUTION INTRAVENOUS at 01:04

## 2025-04-03 RX ADMIN — POTASSIUM BICARBONATE 50 MEQ: 977.5 TABLET, EFFERVESCENT ORAL at 05:04

## 2025-04-03 RX ADMIN — SODIUM CHLORIDE: 9 INJECTION, SOLUTION INTRAVENOUS at 01:04

## 2025-04-03 RX ADMIN — Medication 800 MG: at 05:04

## 2025-04-03 RX ADMIN — CEFEPIME 2 G: 2 INJECTION, POWDER, FOR SOLUTION INTRAVENOUS at 09:04

## 2025-04-03 RX ADMIN — METOPROLOL SUCCINATE 25 MG: 25 TABLET, EXTENDED RELEASE ORAL at 09:04

## 2025-04-03 RX ADMIN — HYDROCODONE BITARTRATE AND ACETAMINOPHEN 1 TABLET: 5; 325 TABLET ORAL at 07:04

## 2025-04-03 RX ADMIN — ASPIRIN 81 MG: 81 TABLET, COATED ORAL at 09:04

## 2025-04-03 RX ADMIN — LISINOPRIL 10 MG: 10 TABLET ORAL at 09:04

## 2025-04-03 RX ADMIN — Medication 800 MG: at 09:04

## 2025-04-03 NOTE — DISCHARGE SUMMARY
[facility]  Discharge Summary      Patient Name: Aleksandra Keys  MRN: [id number]  ACE: [id number]  Patient Class: IP- Inpatient  Admission Date: 4/1/2025  Hospital Length of Stay: 2 days  Discharge Date and Time: 04/03/2025   Attending Physician: Winston Bruce MD   Discharging Provider: Winston Bruce MD  Primary Care Provider: Fernanda Hermosillo MD    Primary Care Team: Networked reference to record PCT     HPI:   Aleksandra Keys is a 69 y.o. male with a past medical history of CAD status post CABG, hyperlipidemia, previous kidney stones, and diverticulitis that presents with 3 days of right-sided flank pain.  Pain initially was in the left side and he thought could potentially have been his diverticulitis.  Pain then migrated over to the right and was back pain radiating to his groin.  Pain is severe and he has difficulty finding comfortable position.  Does endorse nausea without vomiting.  Had difficulty taking medications secondary to nausea and pain.  States that he has low-grade fever, but did not take his temperature.  Denies vomiting, constipation, diarrhea, urinary symptoms, chest pain, and shortness of breath.     In the ED he was afebrile, hemodynamically stable. UA was suggestive of infection. CT abdomen pelvis showed 8 x 8 mm right kidney stone at [facility] with mild right hydronephrosis. Patient was admitted for further management.     Procedure(s) (LRB):  CYSTOSCOPY, WITH URETERAL STENT INSERTION (Right)      Hospital Course:   Patient is a 69 year old male with history of diverticulosis, nephrolithiasis, who was admitted with right sided stone at [facility] with UTI and right sided mild hydronephrosis. Patient was seen by nephrology and underwent stent placement. He was started on cefepime and IVF. His urine culture is negative. Pain is controlled with Norco. Given recent procedure, will continue Keflex for 5 more days. Outpatient follow up scheduled with  urology.     Goals of Care Treatment Preferences:  Code Status: Full Code      SDOH Screening:  The patient declined to be screened for utility difficulties, food insecurity, transport difficulties, housing insecurity, and interpersonal safety, so no concerns could be identified this admission.     Consults:     Assessment & Plan  Ureterolithiasis  Right side hydronephrosis   CT abdomen showed 8 x 8 mm stone at [facility]   Urology consulted, s/p stent placement 4/1   Norco on discharge   Outpatient urology follow up next week     Hypertension associated with diabetes  Resume home meds     Anxiety  Resume home meds    Hyperlipidemia  Cont statin     GERD (gastroesophageal reflux disease)  Cont PPI    Occlusive coronary artery disease  Cont aspirin, statin   Obesity (BMI 30.0-34.9)  Diet and exercise counseling     Diabetes mellitus type 2, controlled  Patient's FSGs are controlled on current medication regimen.  Last A1c reviewed-   Lab Results   Component Value Date    HGBA1C 6.7 (H) 10/25/2024     Most recent fingerstick glucose reviewed-   Recent Labs   Lab 04/02/25  1629 04/02/25  2013 04/03/25  0743 04/03/25  1138   POCTGLUCOSE 196* 209* 150* 198*     Resume home meds   UTI (urinary tract infection)  Urine culture negative   Patient received two days of cefepime, 5 more days Keflex on discharge, given recent stent placement     Final Active Diagnoses:    Diagnosis Date Noted POA    PRINCIPAL PROBLEM:  Ureterolithiasis [N20.1] 04/01/2025 Yes    UTI (urinary tract infection) [N39.0] 04/01/2025 Yes    Obesity (BMI 30.0-34.9) [E66.811] 03/07/2015 Yes    Diabetes mellitus type 2, controlled [E11.9] 03/07/2015 Yes    Occlusive coronary artery disease [I25.10]  Yes    Anxiety [F41.9]  Yes    Hyperlipidemia [E78.5]  Yes    GERD (gastroesophageal reflux disease) [K21.9]  Yes    Hypertension associated with diabetes [E11.59, I15.2]  Yes      Problems Resolved During this Admission:       Discharged Condition:  good    Disposition:     Follow Up:   Follow-up Information       Fernanda Hermosillo MD. Go on 4/8/2025.    Specialties: Hospitalist, Internal Medicine  Why: 10:20 AM for hospital follow up  Contact information:  [address]   [address]  [place] [address]  [phone]               Emilia Luke MD Follow up.    Specialty: Urology  Why: 4/14/2025 11:00 AM- lab  4/23/25- procedure.  Please contact office for any questions or concerns.  Contact information:  [address] [place] [address] [address]  [place] [address]  [phone]                           Patient Instructions:      Ambulatory referral/consult to Pharmacy Assistance   Standing Status: Future   Referral Priority: Routine Referral Type: Consultation   Referral Reason: Specialty Services Required   Number of Visits Requested: 1     Procedure Order to Urology   Standing Status: Future Standing Exp. Date: 04/01/26     Order Specific Question Answer Comments   Procedure Ureteroscopy Stone Extraction (Remove Calculus Ureter) 4/23/25, [facility], RIGHT RETURN   Facility Name: [facility]        Significant Diagnostic Studies: Labs: CMP   Recent Labs   Lab 04/02/25  0431 04/03/25  0345    140   K 4.2 3.7   CO2 22* 25   BUN 17 14   CREATININE 1.0 0.8   CALCIUM 8.4* 8.2*   ALBUMIN 3.6 3.4*   BILITOT 0.7 1.2*   ALKPHOS 62 52   AST 25 25   ALT 24 22    and CBC   Recent Labs   Lab 04/02/25  0431 04/03/25  0345   WBC 7.72 9.90   HGB 15.5 15.0   HCT 44.2 42.7    200       Pending Diagnostic Studies:       None           Medications:  Reconciled Home Medications:      Medication List        START taking these medications      cephALEXin 250 mg/5 mL suspension  Commonly known as: KEFLEX  Take 5 mLs (250 mg total) by mouth 4 (four) times daily. for 5 days     HYDROcodone-acetaminophen 5-325 mg per tablet  Commonly known as: NORCO  Take 1 tablet by mouth every 6 (six) hours as needed for Pain.     tamsulosin 0.4 mg Cap  Commonly known as: FLOMAX  Take 1  capsule (0.4 mg total) by mouth every evening. Take 1 nightly for stent/stone            CONTINUE taking these medications      aspirin 81 MG EC tablet  Commonly known as: ECOTRIN  Take 81 mg by mouth once daily.     atorvastatin 40 MG tablet  Commonly known as: LIPITOR  Take 1 tablet (40 mg total) by mouth every evening.     lisinopriL 10 MG tablet  Take 10 mg by mouth once daily.     metFORMIN 1000 MG tablet  Commonly known as: GLUCOPHAGE  Take 1,000 mg by mouth 2 (two) times daily.     metoprolol succinate 25 MG 24 hr tablet  Commonly known as: TOPROL-XL  Take 1 tablet by mouth once daily.     montelukast 10 mg tablet  Commonly known as: SINGULAIR  Take 10 mg by mouth every evening.     phenazopyridine 95 MG tablet  Commonly known as: PYRIDIUM  Take 95 mg by mouth 3 (three) times daily as needed for Pain.              Indwelling Lines/Drains at time of discharge:   Lines/Drains/Airways       Drain  Duration                  Ureteral Drain/Stent 04/01/25 1445 Right ureter 6 Fr. 1 day                    Time spent on the discharge of patient: 40 minutes         Winston Bruce MD  Department of Hospital Medicine  [facility]/Surg

## 2025-04-03 NOTE — ASSESSMENT & PLAN NOTE
Urine culture negative   Patient received two days of cefepime, 5 more days Keflex on discharge, given recent stent placement

## 2025-04-03 NOTE — CARE UPDATE
04/02/25 1920   Patient Assessment/Suction   Level of Consciousness (AVPU) alert   Respiratory Effort Normal;Unlabored   Expansion/Accessory Muscles/Retractions no retractions;no use of accessory muscles   All Lung Fields Breath Sounds Anterior:;clear;equal bilaterally   Rhythm/Pattern, Respiratory pattern regular   Cough Frequency no cough   PRE-TX-O2   Device (Oxygen Therapy) room air   SpO2 (!) 92 %   Pulse Oximetry Type Intermittent   $ Pulse Oximetry - Multiple Charge Pulse Oximetry - Multiple   Pulse 66   Resp 16

## 2025-04-03 NOTE — PLAN OF CARE
Problem: Adult Inpatient Plan of Care  Goal: Plan of Care Review  Outcome: Progressing  Goal: Patient-Specific Goal (Individualized)  Outcome: Progressing  Goal: Absence of Hospital-Acquired Illness or Injury  Outcome: Progressing  Goal: Optimal Comfort and Wellbeing  Outcome: Progressing  Goal: Readiness for Transition of Care  Outcome: Progressing     Problem: Diabetes Comorbidity  Goal: Blood Glucose Level Within Targeted Range  Outcome: Progressing     Problem: Pain Acute  Goal: Optimal Pain Control and Function  Outcome: Progressing     Problem: Fall Injury Risk  Goal: Absence of Fall and Fall-Related Injury  Outcome: Progressing     Problem: Infection  Goal: Absence of Infection Signs and Symptoms  Outcome: Progressing     Problem: UTI (Urinary Tract Infection)  Goal: Improved Infection Symptoms  Outcome: Progressing

## 2025-04-03 NOTE — PLAN OF CARE
Pt clear for DC from case management standpoint. Discharging to home.  Updated AVS with scheduled appointments.     04/03/25 1156   Final Note   Assessment Type Final Discharge Note   Anticipated Discharge Disposition Home   Post-Acute Status   Discharge Delays None known at this time

## 2025-04-03 NOTE — ASSESSMENT & PLAN NOTE
Patient's FSGs are controlled on current medication regimen.  Last A1c reviewed-   Lab Results   Component Value Date    HGBA1C 6.7 (H) 10/25/2024     Most recent fingerstick glucose reviewed-   Recent Labs   Lab 04/02/25  1629 04/02/25  2013 04/03/25  0743 04/03/25  1138   POCTGLUCOSE 196* 209* 150* 198*     Resume home meds

## 2025-04-03 NOTE — ASSESSMENT & PLAN NOTE
Right side hydronephrosis   CT abdomen showed 8 x 8 mm stone at UPJ   Urology consulted, s/p stent placement 4/1   Norco on discharge   Outpatient urology follow up next week

## 2025-04-03 NOTE — PLAN OF CARE
POC reviewed with patient. Verbalized understanding. Pain well controlled on oral medication. Ambulating in room without difficulty. Voids spontaneously without difficulty. Urine blood-tinged, but much clearer than yesterday. Tolerating PO well. Patient is adequate for discharge and will be discharged to home today.

## 2025-04-03 NOTE — PROGRESS NOTES
Pharmacist Renal Dose Adjustment Note    Aleksandra Keys is a 69 y.o. male being treated with the medication cefepime    Patient Data:    Vital Signs (Most Recent):  Temp: 97.4 °F (36.3 °C) (04/03/25 0720)  Pulse: 63 (04/03/25 0721)  Resp: 17 (04/03/25 0720)  BP: 120/61 (04/03/25 0720)  SpO2: 95 % (04/03/25 0721) Vital Signs (72h Range):  Temp:  [97.4 °F (36.3 °C)-98.9 °F (37.2 °C)]   Pulse:  [55-88]   Resp:  [13-20]   BP: ()/()   SpO2:  [90 %-98 %]      Recent Labs   Lab 04/01/25  0511 04/02/25  0431 04/03/25  0345   CREATININE 1.1 1.0 0.8     Serum creatinine: 0.8 mg/dL 04/03/25 0345  Estimated creatinine clearance: 77.8 mL/min    Medication:cefepime dose: 2 grams frequency every 12 hours will be changed to medication:cefepime dose:2 grams  frequency:every 8 hours    Pharmacist's Name: Yina Reyes  Pharmacist's Extension: [phone]

## 2025-04-04 ENCOUNTER — TELEPHONE (OUTPATIENT)
Dept: UROLOGY | Facility: CLINIC | Age: 70
End: 2025-04-04
Payer: MEDICARE

## 2025-04-04 LAB
BACTERIA BLD CULT: NORMAL
BACTERIA BLD CULT: NORMAL

## 2025-04-04 NOTE — TELEPHONE ENCOUNTER
----- Message from Basilia sent at 4/4/2025  9:56 AM CDT -----  Contact: Self  Type:  Sooner Appointment RequestCaller is requesting a sooner appointment.  Caller declined first available appointment listed below.  Caller will not accept being placed on the waitlist and is requesting a message be sent to doctor.Name of Caller:  PatientWhen is the first available appointment?  04/22Symptoms:  [facility] hosp f/u kidney stones  Wants consult (pt scheduled for surgery w. Dr Luke on 04/23 and he is adamant that he see her in office before the surgery)Would the patient rather a call back or a response via [facility]? [facility] Call Back Number: [phone] or [phone] Information:  Pt is scheduled for urine test on 04/14 and wanted to see if she could get him in after that to discuss everything and discuss options. Can we please call pt back to assist. Thank You.

## 2025-04-04 NOTE — TELEPHONE ENCOUNTER
Spoke with patient appointment scheduled on 4/10 with  to discuss upcoming procedure. Patient verbally voiced understanding.

## 2025-04-10 ENCOUNTER — PATIENT MESSAGE (OUTPATIENT)
Dept: UROLOGY | Facility: CLINIC | Age: 70
End: 2025-04-10

## 2025-04-10 ENCOUNTER — OFFICE VISIT (OUTPATIENT)
Dept: UROLOGY | Facility: CLINIC | Age: 70
End: 2025-04-10
Payer: MEDICARE

## 2025-04-10 VITALS — BODY MASS INDEX: 32.1 KG/M2 | WEIGHT: 170 LBS | HEIGHT: 61 IN

## 2025-04-10 DIAGNOSIS — N20.0 KIDNEY STONES: Primary | ICD-10-CM

## 2025-04-10 DIAGNOSIS — C67.9 MALIGNANT NEOPLASM OF URINARY BLADDER, UNSPECIFIED SITE: ICD-10-CM

## 2025-04-10 PROCEDURE — 99214 OFFICE O/P EST MOD 30 MIN: CPT | Mod: S$PBB,,, | Performed by: UROLOGY

## 2025-04-10 PROCEDURE — 99999 PR PBB SHADOW E&M-EST. PATIENT-LVL III: CPT | Mod: PBBFAC,,, | Performed by: UROLOGY

## 2025-04-10 PROCEDURE — 81003 URINALYSIS AUTO W/O SCOPE: CPT | Performed by: UROLOGY

## 2025-04-10 PROCEDURE — G2211 COMPLEX E/M VISIT ADD ON: HCPCS | Mod: S$PBB,,, | Performed by: UROLOGY

## 2025-04-10 PROCEDURE — 99213 OFFICE O/P EST LOW 20 MIN: CPT | Mod: PBBFAC,PO | Performed by: UROLOGY

## 2025-04-10 RX ORDER — AMOXICILLIN AND CLAVULANATE POTASSIUM 500; 125 MG/1; MG/1
1 TABLET, FILM COATED ORAL 2 TIMES DAILY
Qty: 10 TABLET | Refills: 0 | Status: SHIPPED | OUTPATIENT
Start: 2025-04-19 | End: 2025-04-24

## 2025-04-10 RX ORDER — KETOROLAC TROMETHAMINE 10 MG/1
10 TABLET, FILM COATED ORAL EVERY 6 HOURS PRN
Qty: 10 TABLET | Refills: 0 | Status: SHIPPED | OUTPATIENT
Start: 2025-04-10 | End: 2025-04-15

## 2025-04-10 RX ORDER — TRAMADOL HYDROCHLORIDE 50 MG/1
50 TABLET ORAL EVERY 6 HOURS PRN
Qty: 10 TABLET | Refills: 0 | Status: SHIPPED | OUTPATIENT
Start: 2025-04-10

## 2025-04-10 NOTE — PROGRESS NOTES
[facility] Urology, formerly known as [facility]  Group MD's:Marly/Sergo/Rena  Group NP's: Jennie Crook Mita Sultana  Office #: [phone]    PCP: Fernanda Hermosillo MD  Date of Service: 04/10/2025  Today's note written by: MD Marly    [facility] New Patient/H&P and Consult Note by :     Aleksandra Keys is a 69 y.o. male who presents for obstructing right urolithiasis.  Patient with a history of HTN, DM, diverticulitis, HLD, ITP and kidney stones who presented to the ED on 4/1/25 with severe right flank pain for 3 days.   CT abdomen pelvis with contrast with an 8 mm right UPJ stone with mild right hydronephrosis. Urology consulted to assist with management. Urine with positive nitrite, >100 RBC, 20 WBC.   He reports a history of stones s/p ESWL x 2 in the past.   Denies any fever, chills, gross hematuria, bone pain, unintentional weight loss,  trauma or history of  malignancy.           Procedure(s) Performed 4/1/25  1. Cystourethroscopy  2. Right retrograde pyelogram  3. Right ureteral stent placement, 6 x 24 JJ stent      Interval history by ME/ in CLINIC (In-person) on 4/10/25:  History of Present Illness    CHIEF COMPLAINT:  Mr. Keys presents for follow-up of right-sided kidney stone with stent placement, complaining of ongoing pain and urinary symptoms.    HPI:  Mr. Keys initially presented to the hospital with bilateral pain, which led to an ER visit on April 1st. He had been having pain for 3 days prior to the visit and has a history of kidney stones, having undergone shockwave treatment twice in the past. During this recent hospital stay, an 8mm stone was identified in the right ureter, and a stent was placed.    Since the stent placement, he has had constant, extremely severe bladder spasms. He reports burning sensation and pain associated with the stent, which has been present since its insertion. This is  his first time with a stent. His urine has been bloody, though it temporarily cleared up after receiving a Toradol injection from his regular physician on Tuesday.    He is now also complaining of left-sided pain, which began before hospital discharge. He describes the left-sided pain as different from kidney stone pain and notes that it hurts with palpation, suggesting it might be musculoskeletal in nature.    He was evaluated by his regular physician on Tuesday following hospital discharge. At this visit, he received a Toradol injection and an antibiotic injection, which provided the most relief he had in 2 weeks. For 2 days following these injections, he slept well, felt better, and his urine cleared up.    He has a history of bladder cancer, diagnosed 2-3 years ago. He has been under the care of Dr. Haley, a urologist, who has been monitoring him with cystoscopies twice a year. His last cystoscopy was in February 2024, and he was scheduled for another one in June 2025.    He reports getting UTIs occasionally and has constant burning sensation in his urinary tract, which he attributes to the numerous stones he has passed over the years.    He denies having any kidney stones on the left side.    PERTINENT MEDICATIONS:  Flomax 0.4 mg, nightly, for urinary symptoms related to kidney stone/stent  Aspirin 81 mg, daily (patient mentioned not currently taking it)  Oxycodone  Discontinued aspirin 81 mg daily (patient mentioned not taking it since being out of the hospital)  Discontinued Norco (liquid form, 5 times daily)  Discontinued Oxycodone    PERTINENT MEDICAL HISTORY:  Kidney stones: History of multiple occurrences  Bladder cancer: Diagnosed 2-3 years ago  Idiopathic thrombocytopenia purpura (ITP): Occurred in childhood    PERTINENT SURGICAL HISTORY:  Spleen removal: For ITP  Open heart surgery  Shockwave lithotripsy: Twice in the past for kidney stones  Cystoscopy: Last performed in February 2024 for bladder  cancer surveillance    PERTINENT TEST RESULTS:  Urine culture: April 1st, no growth, no bacteria  Urinalysis: Today (current visit date), bloody urine noted CT Abdomen/Pelvis W Contrast: Recent, 8mm stone in right ureter, no obvious stones on left side, cyst noted on left side (non-painful)       My notes:  He actually has a urologist,  whom he sees for bladder cancer dx 2 yrs ago and bph and was supposed to have urolift but ended up having bladder cancer.   Gets screening cystoscopies. Last cystoscopy was feb 2024. Due for cysto June 2025.   Urine red today. Dysuria but not new.       Urine history:   4/10/25 sent for ua lizett and culture  4/1/25  ng, nit+/3+bld, >100 rbc/0 wbc  9/6/22 Tr leuk      PSA history:   10/3/23 1.5  Lab Results   Component Value Date    PSA 2.1 11/20/2021    PSA 1.6 06/27/2016    PSA 1.1 08/11/2015         Current REVIEW OF SYSTEMS:  Negative for the remaining 12 points of ROS except for as stated above       PMHx:  Past Medical History:   Diagnosis Date    AK (actinic keratosis)     Anxiety     Benign hypertension     Diabetes mellitus, type 2     Diverticulitis     Fatty liver     GERD (gastroesophageal reflux disease)     Hyperlipidemia     ITP (idiopathic thrombocytopenic purpura)     Kidney stones     Occlusive coronary artery disease        PSHx:  Past Surgical History:   Procedure Laterality Date    COLONOSCOPY  04/11/2016    Dr. Abbott, multiple polyps, recheck 6 month    CORONARY ARTERY BYPASS GRAFT      CYSTOSCOPY W/ URETERAL STENT PLACEMENT Right 4/1/2025    Procedure: CYSTOSCOPY, WITH URETERAL STENT INSERTION;  Surgeon: Dara Trotter Jr., MD;  Location: [facility];  Service: Urology;  Laterality: Right;    EYE SURGERY      SPLENECTOMY, TOTAL         Fam Hx:  Reviewed- pertinent family hx as above    Soc Hx:  Social History[1]    Allergies:  Dapagliflozin and Niacin    Anticoagulation/Aspirin:     Objective:     There were no vitals filed for this visit.    Recent Labs    Lab 04/01/25  0511 04/02/25  0431 04/03/25  0345   WBC 10.08 7.72 9.90   Hgb 17.6 15.5 15.0   Hct 50.1 44.2 42.7   Platelet Count 192 190 200   ]  Recent Labs   Lab 05/13/22  0805 09/06/22  1230 09/07/22  0342 09/07/22  1140 09/08/22  0533 04/11/23  0851 10/25/24  1017 04/01/25  0511 04/02/25  0431 04/03/25  0345   Sodium 139 137 138  --  140 136 139 136 137 140   Potassium 5.0 5.0 3.6   < > 4.3 4.0 4.6 3.9 4.2 3.7   Chloride 103 102 108  --  109 104  --   --   --   --    CO2 25 26 25  --  24 23  --  27 22 L 25   Carbon Dioxide  --   --   --   --   --   --  27  --   --   --    BUN 13 14 12  --  8 13  --  19 17 14   Blood Urea Nitrogen  --   --   --   --   --   --  17  --   --   --    Creatinine 1.1 1.0 0.9  --  1.0 1.1 0.89 1.1 1.0 0.8   Glucose 207 H 139 H 138 H  --  147 H 268 H 150 H  --   --   --    Calcium 9.3 9.5 7.8 L  --  8.2 L 8.7 9.2 9.1 8.4 L 8.2 L   Magnesium 2.0 1.8 1.5 L   < > 2.2 1.8  --   --  1.9 1.8   Alkaline Phosphatase 83 61  --   --   --  95 77  --   --   --    ALP  --   --   --   --   --   --   --  72 62 52   Total Protein 7.1 7.4  --   --   --  6.9  --   --   --   --    Albumin 3.8 4.3  --   --   --  3.9  --  4.3 3.6 3.4 L   Albumin Level  --   --   --   --   --   --  4.5  --   --   --    Total Bilirubin 0.9 1.2 H  --   --   --  1.1 H 1.0  --   --   --    Bilirubin Total  --   --   --   --   --   --   --  1.1 H 0.7 1.2 H   AST 25 35  --   --   --  18 18 22 25 25   ALT 35 28  --   --   --  29 26 27 24 22    < > = values in this interval not displayed.   ]    Lab Results   Component Value Date    HGBA1C 6.7 (H) 10/25/2024         Aleksandra Keys is a 69 y.o. male      1. Kidney stones    2. Malignant neoplasm of urinary bladder, unspecified site      Plan:    's typed/written- Abbreviated/Short Plan:  Return for stone ext and cysto for bladder cancer on wed 4/23  No stones on L  He will check if  can do his urs and stone extraction sooner than 4/23  Sent in abx augmentin  500 twice a day 5d  prior to whichever his procedure is  If he ends up switching care I need copies of op notes, pathology and last clinic visit. Need to risk bladder cancer/stage etc.  Sent urine for ua lizett and culture. If + will treat - since having burning with urination.  Sent in toradol to take for pain q 8 hours as needed with food  Sent in tramadol to alternate with toradol.  Continue flomax 0.4mg nightly until stent out.       The following assessment plan was created by Zaid via ambient listening:  Assessment & Plan    C67.9 Malignant neoplasm of urinary bladder, unspecified site  N20.0 Kidney stones    IMPRESSION:   Patient presents with bilateral flank pain, history of kidney stones, and recent ER visit.   CT Right Ureter shows 8 mm stone; no stones visible on left side.   Left-sided pain likely musculoskeletal in nature.   Current urine culture shows no growth, but new sample sent due to hematuria.   History of bladder cancer; last cystoscopy was in February 2024.   Plan for ureteroscopy with laser lithotripsy of right ureteral stone.    Please review the short plan as above for concise and accurate plan. The dictated/AI generated plan may have some inaccuracies .    PLAN SUMMARY:   Started Tramadol and Toradol for pain management   Initiated antibiotic regimen to begin 5 days before procedure   Discontinued Pyridium   Continue Flomax 0.4 mg nightly until stent removal   Ordered urine for urinalysis and culture   Explained ureteroscopy procedure with laser lithotripsy   Mr. Keys to obtain copies of previous operative notes, pathology reports, and last clinic visit notes if transferring care to Dr. Haley   Mr. Keys to contact Dr. Haley's office about earlier procedure date    MALIGNANT NEOPLASM OF URINARY BLADDER, UNSPECIFIED SITE:   If transferring care to Dr. Haley, patient to obtain copies of previous operative notes, pathology reports, and last clinic visit notes.    KIDNEY STONES:    Explained ureteroscopy procedure, including laser lithotripsy for stone fragmentation and potential need for temporary stent placement post-procedure.   Informed about self-removal of stent with string, if applicable.   Mr. Keys to contact Dr. Haley's office to inquire about earlier procedure date.   Started Toradol for pain, to be taken every 8 hours as needed with food.   Started Tramadol to alternate with Toradol for pain management.   Continue Flomax 0.4 mg nightly until stent removal.   Started antibiotic regimen to begin 5 days prior to procedure.   Discontinued Pyridium.   Ordered urine for urinalysis and culture.             Portions of this note was generated with the assistance of ambient listening technology. Verbal consent was obtained by the patient and accompanying visitor(s) for the recording of patient appointment to facilitate this note. I attest to having reviewed and edited the generated note for accuracy, though some syntax or spelling errors may persist. Please contact the author of this note for any clarification.    Visit today included increased complexity associated with the care of the episodic problem addressed and managing the longitudinal care of the patient due to the serious and/or complex managed problem(s)         To OR today for cystoscopy and  right stent exchange, possible laser lithotripsy and stone extraction. AND SCREENING CYSTOSCOPY FOR BLADDER CANCER (LAST ONE FEB 2025)    PROCEDURE #1: (DONE ALREADY) Temporary ureteral stent placement to drain infection or stretch ureter to allow me to fit instruments to retrieve stone (ANESTHESIA REQUIRED)   PROCEDURE #2: (HERE FOR THIS TODAY) Stone extraction (uteroscopy with or without laser lithotripsy of the stone) and exchange of the temporary ureteral stent (ANESTHESIA REQUIRED)  PROCEDURE #3 vs Stent removal at home: Because of the inflammation from the ureteroscopy a temporary ureteral stent will need to be placed. If the  ureteroscopy is straightforward will place the stent on strings which can be removed by the patient at home in about 5 days. If there is a lot of inflammation or trauma to the ureter from the procedure may place a temporary ureteral stent which will be removed on a Monday in a few weeks at the ambulatory surgical center ([facility]). The goal is to place a stent on strings which can be removed at home to avoid another procedure.          Stent consent:  The patient was counseled extensively on the stent being only temporary. The pt understands that a stent should not be in longer than 1 year, and preferably less than 3 months. A stent can become encrusted, block urine flow and cause kidney function loss if it is not exchanged or removed at least within a year.  They understand that if the stent remains longer becomes encrusted it would require major surgeries and possible nephrectomy to remove the stent and or stones or affected kidney if unable to treat the stones endoscopically.      Ureteroscopy consent info:  The risks and benefits of ureteroscopy were discussed with the patient in detail.  Consent was obtained.  The risks include but are not limited to burning with urination, bleeding, infection, pain, incomplete fragmentation of the stone, need for further procedures, injury to the kidney, ureter, bladder, ureteral stricture and need for open surgery.  The patient was informed that they may require a ureteral stent and that stents can cause irritative voiding symptoms.  They also understand that ureteral stents are temporary and must be removed or exchanged in a timely fashion as they can calcify and make more stones and become difficult to remove. Alternative treatments were also discussed with the patient in detail to include ESWL, percutaneous treatment of the stone, open surgery or observation. Patient understands these risks and has agreed to proceed with surgery.             [1]   Social History  Tobacco Use     Smoking status: Former     Current packs/day: 0.00     Types: Cigarettes     Quit date: 10/9/2010     Years since quittin.5    Smokeless tobacco: Never   Substance Use Topics    Alcohol use: Yes     Comment: occasionally    Drug use: No

## 2025-04-10 NOTE — PATIENT INSTRUCTIONS
1. Kidney stones    2. Malignant neoplasm of urinary bladder, unspecified site      Plan:    's typed/written- Abbreviated/Short Plan:  Return for stone ext and cysto for bladder cancer on wed 4/23  No stones on L  He will check if  can do his urs and stone extraction sooner than 4/23  Sent in abx augmentin 500 twice a day 5d  prior to whichever his procedure is  If he ends up switching care I need copies of op notes, pathology and last clinic visit. Need to risk bladder cancer/stage etc.  Sent urine for ua lizett and culture. If + will treat - since having burning with urination.  Sent in toradol to take for pain q 8 hours as needed with food  Sent in tramadol to alternate with toradol.  Continue flomax 0.4mg nightly until stent out.       The following assessment plan was created by Zaid via ambient listening:  Assessment & Plan    C67.9 Malignant neoplasm of urinary bladder, unspecified site  N20.0 Kidney stones    IMPRESSION:   Patient presents with bilateral flank pain, history of kidney stones, and recent ER visit.   CT Right Ureter shows 8 mm stone; no stones visible on left side.   Left-sided pain likely musculoskeletal in nature.   Current urine culture shows no growth, but new sample sent due to hematuria.   History of bladder cancer; last cystoscopy was in February 2024.   Plan for ureteroscopy with laser lithotripsy of right ureteral stone.    Please review the short plan as above for concise and accurate plan. The dictated/AI generated plan may have some inaccuracies .    PLAN SUMMARY:   Started Tramadol and Toradol for pain management   Initiated antibiotic regimen to begin 5 days before procedure   Discontinued Pyridium   Continue Flomax 0.4 mg nightly until stent removal   Ordered urine for urinalysis and culture   Explained ureteroscopy procedure with laser lithotripsy   Mr. Keys to obtain copies of previous operative notes, pathology reports, and last clinic visit notes  if transferring care to Dr. Haley   Mr. Keys to contact Dr. Haley's office about earlier procedure date    MALIGNANT NEOPLASM OF URINARY BLADDER, UNSPECIFIED SITE:   If transferring care to Dr. Haley, patient to obtain copies of previous operative notes, pathology reports, and last clinic visit notes.    KIDNEY STONES:   Explained ureteroscopy procedure, including laser lithotripsy for stone fragmentation and potential need for temporary stent placement post-procedure.   Informed about self-removal of stent with string, if applicable.   Mr. Keys to contact Dr. Haley's office to inquire about earlier procedure date.   Started Toradol for pain, to be taken every 8 hours as needed with food.   Started Tramadol to alternate with Toradol for pain management.   Continue Flomax 0.4 mg nightly until stent removal.   Started antibiotic regimen to begin 5 days prior to procedure.   Discontinued Pyridium.   Ordered urine for urinalysis and culture.             Portions of this note was generated with the assistance of ambient listening technology. Verbal consent was obtained by the patient and accompanying visitor(s) for the recording of patient appointment to facilitate this note. I attest to having reviewed and edited the generated note for accuracy, though some syntax or spelling errors may persist. Please contact the author of this note for any clarification.

## 2025-04-11 LAB
BILIRUB UR QL STRIP.AUTO: ABNORMAL
CLARITY UR: ABNORMAL
COLOR UR AUTO: ABNORMAL
GLUCOSE UR QL STRIP: ABNORMAL
HGB UR QL STRIP: ABNORMAL
KETONES UR QL STRIP: ABNORMAL
LEUKOCYTE ESTERASE UR QL STRIP: ABNORMAL
MICROSCOPIC COMMENT: ABNORMAL
NITRITE UR QL STRIP: ABNORMAL
PH UR STRIP: ABNORMAL [PH]
PROT UR QL STRIP: ABNORMAL
RBC #/AREA URNS AUTO: >100 /HPF (ref 0–4)
SP GR UR STRIP: 1.02
UROBILINOGEN UR STRIP-ACNC: ABNORMAL EU/DL
WBC #/AREA URNS AUTO: 1 /HPF (ref 0–5)

## 2025-04-16 PROBLEM — N20.0 KIDNEY STONE: Status: ACTIVE | Noted: 2025-04-16

## 2025-05-06 ENCOUNTER — HOSPITAL ENCOUNTER (INPATIENT)
Facility: HOSPITAL | Age: 70
LOS: 1 days | Discharge: HOME | DRG: 690 | End: 2025-05-07
Attending: EMERGENCY MEDICINE | Admitting: STUDENT IN AN ORGANIZED HEALTH CARE EDUCATION/TRAINING PROGRAM
Payer: MEDICARE

## 2025-05-06 DIAGNOSIS — Z96.0 URETERAL STENT PRESENT: Primary | ICD-10-CM

## 2025-05-06 DIAGNOSIS — N10 ACUTE PYELONEPHRITIS: ICD-10-CM

## 2025-05-06 DIAGNOSIS — R07.9 CHEST PAIN: ICD-10-CM

## 2025-05-06 PROBLEM — I50.9 HEART FAILURE, UNSPECIFIED: Status: ACTIVE | Noted: 2020-06-10

## 2025-05-06 PROBLEM — Z86.2 HISTORY OF ITP: Status: ACTIVE | Noted: 2022-05-16

## 2025-05-06 LAB
ABSOLUTE EOSINOPHIL (SMH): 0.35 K/UL
ABSOLUTE MONOCYTE (SMH): 1.19 K/UL (ref 0.3–1)
ABSOLUTE NEUTROPHIL COUNT (SMH): 6.5 K/UL (ref 1.8–7.7)
ALBUMIN SERPL-MCNC: 3.4 G/DL (ref 3.5–5.2)
ALP SERPL-CCNC: 72 UNIT/L (ref 40–150)
ALT SERPL-CCNC: 15 UNIT/L (ref 10–44)
AMORPH CRY UR QL COMP ASSIST: ABNORMAL
ANION GAP (SMH): 17 MMOL/L (ref 8–16)
AST SERPL-CCNC: 27 UNIT/L (ref 11–45)
BACTERIA #/AREA URNS AUTO: ABNORMAL /HPF
BASOPHILS # BLD AUTO: 0.05 K/UL
BASOPHILS NFR BLD AUTO: 0.5 %
BILIRUB SERPL-MCNC: 0.6 MG/DL (ref 0.1–1)
BILIRUB UR QL STRIP.AUTO: NEGATIVE
BUN SERPL-MCNC: 15 MG/DL (ref 8–23)
CALCIUM SERPL-MCNC: 9.1 MG/DL (ref 8.7–10.5)
CHLORIDE SERPL-SCNC: 102 MMOL/L (ref 95–110)
CLARITY UR: ABNORMAL
CO2 SERPL-SCNC: 21 MMOL/L (ref 23–29)
COLOR UR AUTO: YELLOW
CREAT SERPL-MCNC: 1.2 MG/DL (ref 0.5–1.4)
ERYTHROCYTE [DISTWIDTH] IN BLOOD BY AUTOMATED COUNT: 11.4 % (ref 11.5–14.5)
GFR SERPLBLD CREATININE-BSD FMLA CKD-EPI: >60 ML/MIN/1.73/M2
GLUCOSE SERPL-MCNC: 146 MG/DL (ref 70–110)
GLUCOSE UR QL STRIP: ABNORMAL
HCT VFR BLD AUTO: 44.6 % (ref 40–54)
HGB BLD-MCNC: 15.4 GM/DL (ref 14–18)
HGB UR QL STRIP: ABNORMAL
IMM GRANULOCYTES # BLD AUTO: 0.05 K/UL (ref 0–0.04)
IMM GRANULOCYTES NFR BLD AUTO: 0.5 % (ref 0–0.5)
KETONES UR QL STRIP: ABNORMAL
LEUKOCYTE ESTERASE UR QL STRIP: ABNORMAL
LYMPHOCYTES # BLD AUTO: 2.71 K/UL (ref 1–4.8)
MCH RBC QN AUTO: 34.1 PG (ref 27–31)
MCHC RBC AUTO-ENTMCNC: 34.5 G/DL (ref 32–36)
MCV RBC AUTO: 99 FL (ref 82–98)
MICROSCOPIC COMMENT: ABNORMAL
NITRITE UR QL STRIP: NEGATIVE
NUCLEATED RBC (/100WBC) (SMH): 0 /100 WBC
PH UR STRIP: 6 [PH]
PLATELET # BLD AUTO: 283 K/UL (ref 150–450)
PMV BLD AUTO: 9.9 FL (ref 9.2–12.9)
POCT GLUCOSE: 139 MG/DL (ref 70–110)
POCT GLUCOSE: 152 MG/DL (ref 70–110)
POCT GLUCOSE: 163 MG/DL (ref 70–110)
POCT GLUCOSE: 169 MG/DL (ref 70–110)
POTASSIUM SERPL-SCNC: 4 MMOL/L (ref 3.5–5.1)
PROT SERPL-MCNC: 7 GM/DL (ref 6–8.4)
PROT UR QL STRIP: ABNORMAL
RBC # BLD AUTO: 4.51 M/UL (ref 4.6–6.2)
RBC #/AREA URNS AUTO: >100 /HPF
RELATIVE EOSINOPHIL (SMH): 3.2 % (ref 0–8)
RELATIVE LYMPHOCYTE (SMH): 24.9 % (ref 18–48)
RELATIVE MONOCYTE (SMH): 10.9 % (ref 4–15)
RELATIVE NEUTROPHIL (SMH): 60 % (ref 38–73)
SODIUM SERPL-SCNC: 140 MMOL/L (ref 136–145)
SP GR UR STRIP: 1.02
SQUAMOUS #/AREA URNS AUTO: 2 /HPF
UROBILINOGEN UR STRIP-ACNC: NEGATIVE EU/DL
WBC # BLD AUTO: 10.89 K/UL (ref 3.9–12.7)
WBC #/AREA URNS AUTO: >100 /HPF
WBC CLUMPS UR QL AUTO: ABNORMAL

## 2025-05-06 PROCEDURE — 25000003 PHARM REV CODE 250: Performed by: STUDENT IN AN ORGANIZED HEALTH CARE EDUCATION/TRAINING PROGRAM

## 2025-05-06 PROCEDURE — 63600175 PHARM REV CODE 636 W HCPCS: Performed by: EMERGENCY MEDICINE

## 2025-05-06 PROCEDURE — 11000001 HC ACUTE MED/SURG PRIVATE ROOM

## 2025-05-06 PROCEDURE — 80053 COMPREHEN METABOLIC PANEL: CPT | Performed by: EMERGENCY MEDICINE

## 2025-05-06 PROCEDURE — 85025 COMPLETE CBC W/AUTO DIFF WBC: CPT | Performed by: EMERGENCY MEDICINE

## 2025-05-06 PROCEDURE — 25000003 PHARM REV CODE 250: Performed by: NURSE PRACTITIONER

## 2025-05-06 PROCEDURE — 25000003 PHARM REV CODE 250: Performed by: EMERGENCY MEDICINE

## 2025-05-06 PROCEDURE — 63600175 PHARM REV CODE 636 W HCPCS: Performed by: NURSE PRACTITIONER

## 2025-05-06 PROCEDURE — 36415 COLL VENOUS BLD VENIPUNCTURE: CPT | Performed by: EMERGENCY MEDICINE

## 2025-05-06 PROCEDURE — 87086 URINE CULTURE/COLONY COUNT: CPT | Performed by: EMERGENCY MEDICINE

## 2025-05-06 PROCEDURE — 99285 EMERGENCY DEPT VISIT HI MDM: CPT | Mod: 25

## 2025-05-06 PROCEDURE — 99223 1ST HOSP IP/OBS HIGH 75: CPT | Mod: ,,, | Performed by: UROLOGY

## 2025-05-06 PROCEDURE — 81001 URINALYSIS AUTO W/SCOPE: CPT | Performed by: EMERGENCY MEDICINE

## 2025-05-06 PROCEDURE — 96374 THER/PROPH/DIAG INJ IV PUSH: CPT

## 2025-05-06 RX ORDER — HYDROMORPHONE HYDROCHLORIDE 1 MG/ML
1 INJECTION, SOLUTION INTRAMUSCULAR; INTRAVENOUS; SUBCUTANEOUS
Refills: 0 | Status: COMPLETED | OUTPATIENT
Start: 2025-05-06 | End: 2025-05-06

## 2025-05-06 RX ORDER — NALOXONE HCL 0.4 MG/ML
0.02 VIAL (ML) INJECTION
Status: DISCONTINUED | OUTPATIENT
Start: 2025-05-06 | End: 2025-05-07 | Stop reason: HOSPADM

## 2025-05-06 RX ORDER — INSULIN ASPART 100 [IU]/ML
0-5 INJECTION, SOLUTION INTRAVENOUS; SUBCUTANEOUS
Status: DISCONTINUED | OUTPATIENT
Start: 2025-05-06 | End: 2025-05-07 | Stop reason: HOSPADM

## 2025-05-06 RX ORDER — SODIUM,POTASSIUM PHOSPHATES 280-250MG
2 POWDER IN PACKET (EA) ORAL
Status: DISCONTINUED | OUTPATIENT
Start: 2025-05-06 | End: 2025-05-07 | Stop reason: HOSPADM

## 2025-05-06 RX ORDER — SODIUM CHLORIDE 0.9 % (FLUSH) 0.9 %
3 SYRINGE (ML) INJECTION EVERY 12 HOURS PRN
Status: DISCONTINUED | OUTPATIENT
Start: 2025-05-06 | End: 2025-05-07 | Stop reason: HOSPADM

## 2025-05-06 RX ORDER — SODIUM CHLORIDE 9 MG/ML
125 INJECTION, SOLUTION INTRAVENOUS CONTINUOUS
Status: DISCONTINUED | OUTPATIENT
Start: 2025-05-06 | End: 2025-05-07 | Stop reason: HOSPADM

## 2025-05-06 RX ORDER — HYDROMORPHONE HYDROCHLORIDE 1 MG/ML
1 INJECTION, SOLUTION INTRAMUSCULAR; INTRAVENOUS; SUBCUTANEOUS EVERY 4 HOURS PRN
Refills: 0 | Status: DISCONTINUED | OUTPATIENT
Start: 2025-05-06 | End: 2025-05-07 | Stop reason: HOSPADM

## 2025-05-06 RX ORDER — IBUPROFEN 200 MG
24 TABLET ORAL
Status: DISCONTINUED | OUTPATIENT
Start: 2025-05-06 | End: 2025-05-07 | Stop reason: HOSPADM

## 2025-05-06 RX ORDER — ENOXAPARIN SODIUM 100 MG/ML
40 INJECTION SUBCUTANEOUS EVERY 24 HOURS
Status: DISCONTINUED | OUTPATIENT
Start: 2025-05-06 | End: 2025-05-07 | Stop reason: HOSPADM

## 2025-05-06 RX ORDER — SIMETHICONE 80 MG
1 TABLET,CHEWABLE ORAL 4 TIMES DAILY PRN
Status: DISCONTINUED | OUTPATIENT
Start: 2025-05-06 | End: 2025-05-07 | Stop reason: HOSPADM

## 2025-05-06 RX ORDER — GLUCAGON 1 MG
1 KIT INJECTION
Status: DISCONTINUED | OUTPATIENT
Start: 2025-05-06 | End: 2025-05-07 | Stop reason: HOSPADM

## 2025-05-06 RX ORDER — METOPROLOL SUCCINATE 25 MG/1
25 TABLET, EXTENDED RELEASE ORAL DAILY
Status: DISCONTINUED | OUTPATIENT
Start: 2025-05-06 | End: 2025-05-07 | Stop reason: HOSPADM

## 2025-05-06 RX ORDER — MORPHINE SULFATE 4 MG/ML
4 INJECTION, SOLUTION INTRAMUSCULAR; INTRAVENOUS EVERY 4 HOURS PRN
Status: DISCONTINUED | OUTPATIENT
Start: 2025-05-06 | End: 2025-05-07 | Stop reason: HOSPADM

## 2025-05-06 RX ORDER — IBUPROFEN 200 MG
16 TABLET ORAL
Status: DISCONTINUED | OUTPATIENT
Start: 2025-05-06 | End: 2025-05-07 | Stop reason: HOSPADM

## 2025-05-06 RX ORDER — PANTOPRAZOLE SODIUM 40 MG/10ML
40 INJECTION, POWDER, LYOPHILIZED, FOR SOLUTION INTRAVENOUS DAILY
Status: DISCONTINUED | OUTPATIENT
Start: 2025-05-06 | End: 2025-05-07 | Stop reason: HOSPADM

## 2025-05-06 RX ORDER — TALC
9 POWDER (GRAM) TOPICAL NIGHTLY PRN
Status: DISCONTINUED | OUTPATIENT
Start: 2025-05-06 | End: 2025-05-07 | Stop reason: HOSPADM

## 2025-05-06 RX ORDER — LISINOPRIL 10 MG/1
10 TABLET ORAL DAILY
Status: DISCONTINUED | OUTPATIENT
Start: 2025-05-06 | End: 2025-05-07 | Stop reason: HOSPADM

## 2025-05-06 RX ORDER — ALUMINUM HYDROXIDE, MAGNESIUM HYDROXIDE, AND SIMETHICONE 1200; 120; 1200 MG/30ML; MG/30ML; MG/30ML
30 SUSPENSION ORAL 4 TIMES DAILY PRN
Status: DISCONTINUED | OUTPATIENT
Start: 2025-05-06 | End: 2025-05-07 | Stop reason: HOSPADM

## 2025-05-06 RX ORDER — SODIUM CHLORIDE 9 MG/ML
INJECTION, SOLUTION INTRAVENOUS CONTINUOUS
Status: DISCONTINUED | OUTPATIENT
Start: 2025-05-06 | End: 2025-05-06

## 2025-05-06 RX ORDER — ACETAMINOPHEN 325 MG/1
650 TABLET ORAL EVERY 8 HOURS PRN
Status: DISCONTINUED | OUTPATIENT
Start: 2025-05-06 | End: 2025-05-06

## 2025-05-06 RX ORDER — LANOLIN ALCOHOL/MO/W.PET/CERES
800 CREAM (GRAM) TOPICAL
Status: DISCONTINUED | OUTPATIENT
Start: 2025-05-06 | End: 2025-05-07 | Stop reason: HOSPADM

## 2025-05-06 RX ORDER — ACETAMINOPHEN 325 MG/1
650 TABLET ORAL EVERY 4 HOURS PRN
Status: DISCONTINUED | OUTPATIENT
Start: 2025-05-06 | End: 2025-05-07 | Stop reason: HOSPADM

## 2025-05-06 RX ORDER — ONDANSETRON HYDROCHLORIDE 2 MG/ML
8 INJECTION, SOLUTION INTRAVENOUS EVERY 6 HOURS PRN
Status: DISCONTINUED | OUTPATIENT
Start: 2025-05-06 | End: 2025-05-07 | Stop reason: HOSPADM

## 2025-05-06 RX ORDER — ATORVASTATIN CALCIUM 40 MG/1
40 TABLET, FILM COATED ORAL NIGHTLY
Status: DISCONTINUED | OUTPATIENT
Start: 2025-05-06 | End: 2025-05-07 | Stop reason: HOSPADM

## 2025-05-06 RX ORDER — ASPIRIN 81 MG/1
81 TABLET ORAL DAILY
Status: DISCONTINUED | OUTPATIENT
Start: 2025-05-06 | End: 2025-05-07 | Stop reason: HOSPADM

## 2025-05-06 RX ADMIN — ATORVASTATIN CALCIUM 40 MG: 40 TABLET, FILM COATED ORAL at 08:05

## 2025-05-06 RX ADMIN — ASPIRIN 81 MG: 81 TABLET, COATED ORAL at 09:05

## 2025-05-06 RX ADMIN — PANTOPRAZOLE SODIUM 40 MG: 40 INJECTION, POWDER, FOR SOLUTION INTRAVENOUS at 09:05

## 2025-05-06 RX ADMIN — HYDROMORPHONE HYDROCHLORIDE 1 MG: 1 INJECTION, SOLUTION INTRAMUSCULAR; INTRAVENOUS; SUBCUTANEOUS at 02:05

## 2025-05-06 RX ADMIN — LISINOPRIL 10 MG: 10 TABLET ORAL at 09:05

## 2025-05-06 RX ADMIN — PIPERACILLIN AND TAZOBACTAM 4.5 G: 4; .5 INJECTION, POWDER, FOR SOLUTION INTRAVENOUS; PARENTERAL at 08:05

## 2025-05-06 RX ADMIN — SODIUM CHLORIDE: 9 INJECTION, SOLUTION INTRAVENOUS at 06:05

## 2025-05-06 RX ADMIN — SODIUM CHLORIDE 125 ML/HR: 9 INJECTION, SOLUTION INTRAVENOUS at 02:05

## 2025-05-06 RX ADMIN — PIPERACILLIN AND TAZOBACTAM 4.5 G: 4; .5 INJECTION, POWDER, FOR SOLUTION INTRAVENOUS; PARENTERAL at 03:05

## 2025-05-06 RX ADMIN — METOPROLOL SUCCINATE 25 MG: 25 TABLET, EXTENDED RELEASE ORAL at 09:05

## 2025-05-06 RX ADMIN — SODIUM CHLORIDE 125 ML/HR: 9 INJECTION, SOLUTION INTRAVENOUS at 08:05

## 2025-05-06 RX ADMIN — PIPERACILLIN SODIUM AND TAZOBACTAM SODIUM 3.38 G: 3; .375 INJECTION, POWDER, LYOPHILIZED, FOR SOLUTION INTRAVENOUS at 05:05

## 2025-05-06 RX ADMIN — ENOXAPARIN SODIUM 40 MG: 40 INJECTION SUBCUTANEOUS at 05:05

## 2025-05-06 NOTE — PLAN OF CARE
[facility] - Med/Surg  Initial Discharge Assessment       Primary Care Provider: Fernanda Hermosillo MD    Admission Diagnosis: Acute pyelonephritis [N10]    Admission Date: 5/6/2025  Expected Discharge Date: 5/8/2025    Transition of Care Barriers: None    Payor: MEDICARE / Plan: MEDICARE PART A & B / Product Type: Government /     Extended Emergency Contact Information  Primary Emergency Contact: MassimoSusan SYED  Address: [address] [place] [address] [facility]  Home Phone: [phone]  Mobile Phone: [phone]  Relation: Spouse  Preferred language: English   needed? No  Secondary Emergency Contact: Abdiel Keys  Address: [address]           [place] [address] [facility]  Home Phone: [phone]  Mobile Phone: [phone]  Relation: Son  Preferred language: English   needed? No    Discharge Plan A: Home with family  Discharge Plan B: Home      [facility] [address]  [place] [address] [facility]  [address] [place] [address]  [place] [address]  Phone: [phone] Fax: [phone]    DC assessment completed at bedside with pt, information verified. Lives with spouse who will drive him home. PCP is Dr. Stringer. Pharm is [facility] [address]. Denies coumadin/hh/hd/dme. Independent, drives self. Insurance verified. Denies POA. NOK is spouse. Denies recent admission. Planning to DC home when clear.     Initial Assessment (most recent)       Adult Discharge Assessment - 05/06/25 1235          Discharge Assessment    Assessment Type Discharge Planning Assessment     Confirmed/corrected address, phone number and insurance Yes     Confirmed Demographics Correct on Facesheet     Source of Information patient     Communicated IRVING with patient/caregiver Yes     People in Home spouse     Facility Arrived From: home     Do you expect to return to your current living situation? Yes     Do you have help at home or someone to help you  manage your care at home? Yes     Who are your caregiver(s) and their phone number(s)? spouse     Prior to hospitilization cognitive status: Alert/Oriented     Current cognitive status: Alert/Oriented     Equipment Currently Used at Home none     Readmission within 30 days? No     Patient currently being followed by outpatient case management? No     Do you currently have service(s) that help you manage your care at home? No     Do you take prescription medications? Yes     Do you have prescription coverage? Yes     Coverage [employer]     Do you have any problems affording any of your prescribed medications? No     Is the patient taking medications as prescribed? yes     Who is going to help you get home at discharge? spouse     How do you get to doctors appointments? car, drives self     Are you on dialysis? No     Do you take coumadin? No     Discharge Plan A Home with family     Discharge Plan B Home     DME Needed Upon Discharge  none     Discharge Plan discussed with: Patient     Transition of Care Barriers None

## 2025-05-06 NOTE — PROGRESS NOTES
Pharmacist Renal Dose Adjustment Note    Aleksandra Keys is a 69 y.o. male being treated with the medication zosyn    Patient Data:    Vital Signs (Most Recent):  Temp: 97.9 °F (36.6 °C) (05/06/25 0121)  Pulse: 63 (05/06/25 0531)  Resp: 16 (05/06/25 0214)  BP: (!) 121/56 (05/06/25 0531)  SpO2: 97 % (05/06/25 0541) Vital Signs (72h Range):  Temp:  [97.9 °F (36.6 °C)]   Pulse:  [63-77]   Resp:  [16-18]   BP: (121-161)/(56-73)   SpO2:  [92 %-99 %]      Recent Labs   Lab 05/02/25  0552 05/06/25  0208   CREATININE 0.90 1.2     Serum creatinine: 1.2 mg/dL 05/06/25 0208  Estimated creatinine clearance: 51.3 mL/min    Medication:zosyn dose: 3.375g frequency q6h will be changed to medication:zosyn dose:4.5g frequency:q8h over 3 hours    Pharmacist's Name: Jarocho Eugene  Pharmacist's Extension: [phone]

## 2025-05-06 NOTE — H&P
[facility]  History & Physical    Patient Name: Aleksandra Keys  MRN: [id number]  Patient Class: IP- Inpatient  Admission Date: 5/6/2025  Attending Physician: Kenneth Jeffries MD   Primary Care Provider: Fernanda Hermosillo MD         Patient information was obtained from patient, spouse/SO, past medical records, and ER records.     Subjective:     Principal Problem:Acute pyelonephritis    Chief Complaint:   Chief Complaint   Patient presents with    Flank Pain        HPI:     Aleksandra Keys is a 69 year old male with a past medical history of CAD status post CABG, hyperlipidemia, previous kidney stones, and diverticulitis who presented to the ED with a complaint of right sided flank pain. He has a history of kidney stones with associated hydronephrosis, and is currently s/p lithotripsy with stent placement. He reports intermittent spasms and flank pain that started tonight. He states he has been drinking plenty of fluids and has been urinating without difficulty. He denies hematuria or fevers. He denies other complaint.    ED work up included a CBC which was unremarkable. CMP was also unremarkable. Urinalysis was significant for infection and hematuria. CT renal stone protocol revealed:  Interval placement of double-J ureteral stent status post reported lithotripsy.  Multiple small calcific densities are present within the right renal collecting system/calices with mild right hydronephrosis similar to prior study.  Right periureteral/perinephric inflammatory change with ill-defined fluid and inflammatory change extending into the right pericolic gutter.  Correlation with urinalysis advised.  2. Air within the right renal collecting system and the urinary bladder, likely relating to recent instrumentation/procedure although correlation with urinalysis is once again advised to exclude infectious process.  3. Liquid stool in the colon which can be seen with diarrheal illness.   Clinical correlation advised.  4. Splenectomy.    He was initiated on Zosyn in the ED and given dilaudid for pain. Hospital Medicine consulted for admission and further management.       Past Medical History:   Diagnosis Date    AK (actinic keratosis)     Anxiety     Benign hypertension     Cancer - bladder     Diabetes mellitus, type 2     Diverticulitis     Fatty liver     GERD (gastroesophageal reflux disease)     Hyperlipidemia     ITP (idiopathic thrombocytopenic purpura)     Kidney stones     Myocardial infarct 2010    Occlusive coronary artery disease        Past Surgical History:   Procedure Laterality Date    COLONOSCOPY  04/11/2016    Dr. Abbott, multiple polyps, recheck 6 month    CORONARY ARTERY BYPASS GRAFT  2010    x 5 vessels    CYSTOSCOPY W/ URETERAL STENT PLACEMENT Right 04/01/2025    Procedure: CYSTOSCOPY, WITH URETERAL STENT INSERTION;  Surgeon: Dara Trotter Jr., MD;  Location: [facility] OR;  Service: Urology;  Laterality: Right;    CYSTOURETEROSCOPY, WITH HOLMIUM LASER LITHOTRIPSY OF URETERAL CALCULUS AND STENT INSERTION Right 5/2/2025    Procedure: CYSTOURETEROSCOPY, WITH THULIUM LASER LITHOTRIPSY OF URETERAL CALCULUS AND STENT INSERTION;  Surgeon: Alok Milan MD;  Location: [facility] OR;  Service: Urology;  Laterality: Right;    EXTRACORPOREAL SHOCK WAVE LITHOTRIPSY Right 04/16/2025    Procedure: LITHOTRIPSY, ESWL;  Surgeon: Alok Milan MD;  Location: [facility] OR;  Service: Urology;  Laterality: Right;    EYE SURGERY      FLEXIBLE CYSTOSCOPY Right 5/2/2025    Procedure: CYSTOSCOPY, FLEXIBLE;  Surgeon: Alok Milan MD;  Location: [facility] OR;  Service: Urology;  Laterality: Right;    SPLENECTOMY, TOTAL         Review of patient's allergies indicates:   Allergen Reactions    Niacin        No current facility-administered medications on file prior to encounter.     Current Outpatient Medications on File Prior to Encounter   Medication Sig    aspirin (ECOTRIN) 81 MG EC tablet Take 81 mg  by mouth once daily.    atorvastatin (LIPITOR) 40 MG tablet Take 1 tablet (40 mg total) by mouth every evening.    [] ciprofloxacin HCl (CIPRO) 500 MG tablet Take 1 tablet (500 mg total) by mouth 2 (two) times daily. for 3 days    lisinopriL 10 MG tablet Take 10 mg by mouth once daily.    metFORMIN (GLUCOPHAGE) 1000 MG tablet Take 1,000 mg by mouth 2 (two) times daily.    metoprolol succinate (TOPROL-XL) 25 MG 24 hr tablet Take 1 tablet by mouth once daily.    oxybutynin (DITROPAN) 5 MG Tab Take 1 tablet (5 mg total) by mouth every 8 (eight) hours as needed (Bladder spasms).    traMADoL (ULTRAM) 50 mg tablet Take 1 tablet (50 mg total) by mouth every 6 (six) hours as needed for Pain.    [DISCONTINUED] montelukast (SINGULAIR) 10 mg tablet Take 10 mg by mouth every evening.    [DISCONTINUED] tamsulosin (FLOMAX) 0.4 mg Cap Take 1 capsule (0.4 mg total) by mouth every evening. Take 1 nightly for stent/stone     Family History       Problem Relation (Age of Onset)    Cancer Paternal Aunt, Paternal Uncle    Diabetes Mother, Brother, Maternal Aunt, Maternal Grandmother    Heart disease Brother, Maternal Uncle, Paternal Uncle, Maternal Grandfather, Paternal Grandfather    Hyperlipidemia Father    Hypertension Mother, Father    No Known Problems Son    Skin cancer Mother          Tobacco Use    Smoking status: Former     Current packs/day: 0.00     Types: Cigarettes     Quit date: 10/9/2010     Years since quittin.5     Passive exposure: Past    Smokeless tobacco: Never   Substance and Sexual Activity    Alcohol use: Yes     Comment: occasionally    Drug use: No    Sexual activity: Yes     Partners: Female     Review of Systems   Constitutional:  Negative for chills and fever.   HENT:  Negative for congestion and sore throat.    Eyes:  Negative for visual disturbance.   Respiratory:  Negative for cough and shortness of breath.    Cardiovascular:  Negative for chest pain and palpitations.   Gastrointestinal:   Negative for abdominal pain, constipation, diarrhea, nausea and vomiting.   Endocrine: Negative for cold intolerance and heat intolerance.   Genitourinary:  Positive for dysuria and flank pain. Negative for hematuria.   Musculoskeletal:  Negative for arthralgias and myalgias.   Skin:  Negative for rash.   Neurological:  Negative for tremors and seizures.   Hematological:  Negative for adenopathy. Does not bruise/bleed easily.   All other systems reviewed and are negative.    Objective:     Vital Signs (Most Recent):  Temp: 97.9 °F (36.6 °C) (05/06/25 0121)  Pulse: 63 (05/06/25 0531)  Resp: 16 (05/06/25 0214)  BP: (!) 121/56 (05/06/25 0531)  SpO2: 97 % (05/06/25 0541) Vital Signs (24h Range):  Temp:  [97.9 °F (36.6 °C)] 97.9 °F (36.6 °C)  Pulse:  [63-77] 63  Resp:  [16-18] 16  SpO2:  [92 %-99 %] 97 %  BP: (121-161)/(56-73) 121/56     Weight: 75.3 kg (166 lb 0.1 oz)  Body mass index is 31.37 kg/m².     Physical Exam  Vitals and nursing note reviewed.   Constitutional:       General: He is awake.      Appearance: Normal appearance. He is well-developed.   HENT:      Head: Normocephalic and atraumatic.      Nose: Nose normal. No septal deviation.   Eyes:      Conjunctiva/sclera: Conjunctivae normal.      Pupils: Pupils are equal, round, and reactive to light.   Neck:      Thyroid: No thyroid mass.      Vascular: No JVD.      Trachea: No tracheal tenderness or tracheal deviation.   Cardiovascular:      Rate and Rhythm: Normal rate and regular rhythm.      Heart sounds: Normal heart sounds, S1 normal and S2 normal. No murmur heard.     No friction rub. No gallop.   Pulmonary:      Effort: Pulmonary effort is normal.      Breath sounds: Normal breath sounds. No decreased breath sounds, wheezing, rhonchi or rales.   Abdominal:      General: Bowel sounds are normal. There is no distension.      Palpations: Abdomen is soft. There is no hepatomegaly, splenomegaly or mass.      Tenderness: There is no abdominal tenderness.  There is right CVA tenderness.   Skin:     General: Skin is warm.      Findings: No rash.   Neurological:      General: No focal deficit present.      Mental Status: He is alert and oriented to person, place, and time.      Cranial Nerves: No cranial nerve deficit.      Sensory: No sensory deficit.   Psychiatric:         Mood and Affect: Mood normal.         Behavior: Behavior normal. Behavior is cooperative.              CRANIAL NERVES     CN III, IV, VI   Pupils are equal, round, and reactive to light.       Significant Labs: All pertinent labs within the past 24 hours have been reviewed.  CBC:   Recent Labs   Lab 05/06/25  0208   WBC 10.89   HGB 15.4   HCT 44.6        CMP:   Recent Labs   Lab 05/06/25  0208      K 4.0      CO2 21*   *   BUN 15   CREATININE 1.2   CALCIUM 9.1   PROT 7.0   ALBUMIN 3.4*   BILITOT 0.6   ALKPHOS 72   AST 27   ALT 15   ANIONGAP 17*     Urine Studies:   Recent Labs   Lab 05/06/25  0437   APPEARANCEUA Hazy*   SPECGRAV 1.020   PROTEINUA 2+*   BILIRUBINUA Negative   UROBILINOGEN Negative   LEUKOCYTESUR 3+*   RBCUA >100*   WBCUA >100*   BACTERIA Many*       Significant Imaging: I have reviewed all pertinent imaging results/findings within the past 24 hours.  Imaging Results              CT Renal Stone Study ABD Pelvis WO (Final result)  Result time 05/06/25 03:39:10      Final result by Tanya Soliz MD (05/06/25 03:39:10)                   Impression:      1. Interval placement of double-J ureteral stent status post reported lithotripsy.  Multiple small calcific densities are present within the right renal collecting system/calices with mild right hydronephrosis similar to prior study.  Right periureteral/perinephric inflammatory change with ill-defined fluid and inflammatory change extending into the right pericolic gutter.  Correlation with urinalysis advised.  2. Air within the right renal collecting system and the urinary bladder, likely relating to recent  instrumentation/procedure although correlation with urinalysis is once again advised to exclude infectious process.  3. Liquid stool in the colon which can be seen with diarrheal illness.  Clinical correlation advised.  4. Splenectomy.  5. Multiple additional findings as above.      Electronically signed by: Tanya Soliz MD  Date:    05/06/2025  Time:    03:39               Narrative:    EXAMINATION:  CT RENAL STONE STUDY ABD PELVIS WO    CLINICAL HISTORY:  Nephrolithiasis, symptomatic/complicated;    TECHNIQUE:  Low dose axial images, sagittal and coronal reformations were obtained from the lung bases to the pubic symphysis.  Contrast was not administered.    COMPARISON:  CT abdomen and pelvis 04/01/2025    FINDINGS:  The visualized lung bases are free of pleural fluid or focal consolidation.  The visualized portions of the heart and pericardium demonstrate calcific atherosclerosis of the coronary vessels.    Please note evaluation of solid organ parenchyma is limited due to lack of IV contrast.  The liver, slightly prominent in size and hypoattenuating which can be seen with hepatic steatosis.  Correlation with LFTs advised.  The spleen is surgically absent.  The pancreas and adrenal glands demonstrate an unremarkable noncontrast CT appearance.  No calcified stones are identified in the gallbladder lumen.    The patient is status post reported right-sided lithotripsy with placement of a double-J ureteral stent with proximal [place] loop in the renal collecting system and distal [place] loop coiled within the urinary bladder.  There are several small calcific densities within the right renal collecting system/calices.  Small foci of air within the collecting system presumably due to recent instrumentation/procedure although correlation with urinalysis advised.  There is continued mild hydronephrosis.  There is right periureteral and perinephric inflammatory change.  Ill-defined fluid and inflammatory change extending  into the right pericolic gutter.   No definite calcific densities noted along the ureteral course of the stent.  There is nonobstructing punctate left-sided nephrolithiasis.  No left hydroureteronephrosis.  There is a 4 cm left renal cortical hypodensity suggestive of a cyst.  The urinary bladder contains air presumably relating to instrumentation although correlation with urinalysis advised.    There is calcified and noncalcified atheromatous plaque throughout the abdominal aorta with redemonstration of calcified intraluminal plaque versus chronic dissection.  There is continued infrarenal abdominal aortic ectasia unchanged from prior studies.    There is liquid stool within the colon which can be seen with diarrheal illness.  Visualized loops of large and small bowel demonstrate no evidence of obstruction or inflammatory change.  The appendix is within normal limits.  No free intraperitoneal air portal venous gas.    Visualized osseous structures are intact.                                      Assessment/Plan:     Assessment & Plan  Acute pyelonephritis  Admit to inpatient  Has ureteral stents in place from Dr. Chase for kidney stones, is s/p lithotripsy  Small pieces are noted in the kidneys with mild hydronephrosis  Complaining of significant pain  +UTI  On Zosyn  NPO in case of procedure      Hypertension associated with diabetes  Chronic, controlled.  Latest blood pressure and vitals reviewed-     Temp:  [97.9 °F (36.6 °C)-98.3 °F (36.8 °C)]   Pulse:  [62-77]   Resp:  [16-18]   BP: (121-161)/(56-74)   SpO2:  [92 %-99 %] .   Home meds for hypertension were reviewed and noted below-  Hypertension Medications              lisinopriL 10 MG tablet Take 10 mg by mouth once daily.    metoprolol succinate (TOPROL-XL) 25 MG 24 hr tablet Take 1 tablet by mouth once daily.            While in the hospital, will manage blood pressure as follows; Continue home antihypertensive regimen    Will utilize p.r.n. blood  "pressure medication only if patient's blood pressure greater than 180/110 and he develops symptoms such as worsening chest pain or shortness of breath.      Hyperlipidemia   Patient is chronically on statin.will not continue for now, held for Npo status. Monitor clinically. Last LDL was   Lab Results   Component Value Date    LDLCALC 67 10/03/2023          GERD (gastroesophageal reflux disease)  Noted, protonix    Obesity (BMI 30.0-34.9)  Body mass index is 31.28 kg/m². Morbid obesity complicates all aspects of disease management from diagnostic modalities to treatment. Weight loss encouraged and health benefits explained to patient.      Diabetes mellitus type 2, controlled  Patient's FSGs are controlled on current medication regimen.  Last A1c reviewed-   Lab Results   Component Value Date    HGBA1C 6.7 (H) 10/25/2024     Most recent fingerstick glucose reviewed- No results for input(s): "POCTGLUCOSE" in the last 24 hours.  Current correctional scale  Low  Maintain anti-hyperglycemic dose as follows-   Antihyperglycemics (From admission, onward)      Start     Stop Route Frequency Ordered    05/06/25 0635  insulin aspart U-100 pen 0-5 Units         -- SubQ Before meals & nightly PRN 05/06/25 0536          Hold Oral hypoglycemics while patient is in the hospital.  S/P CABG x 5  Noted, on tele    CAD (coronary artery disease)  Patient with known CAD s/p CABG, which is controlled Will continue ASA and monitor for S/Sx of angina/ACS. Continue to monitor on telemetry.   History of ITP  Noted  Trend labs    Ureteral stent present  Noted  Consult urology    VTE Risk Mitigation (From admission, onward)           Ordered     enoxaparin injection 40 mg  Daily         05/06/25 0536     IP VTE HIGH RISK PATIENT  Once         05/06/25 0536     Place sequential compression device  Until discontinued         05/06/25 0536                               Pharmacist Renal Dose Adjustment Note    Aleksandra Keys is a 69 y.o. male " being treated with the medication zosyn    Patient Data:    Vital Signs (Most Recent):  Temp: 97.9 °F (36.6 °C) (05/06/25 0121)  Pulse: 63 (05/06/25 0531)  Resp: 16 (05/06/25 0214)  BP: (!) 121/56 (05/06/25 0531)  SpO2: 97 % (05/06/25 0541) Vital Signs (72h Range):  Temp:  [97.9 °F (36.6 °C)]   Pulse:  [63-77]   Resp:  [16-18]   BP: (121-161)/(56-73)   SpO2:  [92 %-99 %]      Recent Labs   Lab 05/02/25  0552 05/06/25  0208   CREATININE 0.90 1.2     Serum creatinine: 1.2 mg/dL 05/06/25 0208  Estimated creatinine clearance: 51.3 mL/min    Medication:zosyn dose: 3.375g frequency q6h will be changed to medication:zosyn dose:4.5g frequency:q8h over 3 hours    Pharmacist's Name: Jarocho Eugene  Pharmacist's Extension: [phone]      SHUKRI D eLeon  Department of Hospital Medicine  [facility]/Surg

## 2025-05-06 NOTE — SUBJECTIVE & OBJECTIVE
Past Medical History:   Diagnosis Date    AK (actinic keratosis)     Anxiety     Benign hypertension     Cancer - bladder     Diabetes mellitus, type 2     Diverticulitis     Fatty liver     GERD (gastroesophageal reflux disease)     Hyperlipidemia     ITP (idiopathic thrombocytopenic purpura)     Kidney stones     Myocardial infarct     Occlusive coronary artery disease        Past Surgical History:   Procedure Laterality Date    COLONOSCOPY  2016    Dr. Abbott, multiple polyps, recheck 6 month    CORONARY ARTERY BYPASS GRAFT  2010    x 5 vessels    CYSTOSCOPY W/ URETERAL STENT PLACEMENT Right 2025    Procedure: CYSTOSCOPY, WITH URETERAL STENT INSERTION;  Surgeon: Dara Trotter Jr., MD;  Location: [facility] OR;  Service: Urology;  Laterality: Right;    CYSTOURETEROSCOPY, WITH HOLMIUM LASER LITHOTRIPSY OF URETERAL CALCULUS AND STENT INSERTION Right 2025    Procedure: CYSTOURETEROSCOPY, WITH THULIUM LASER LITHOTRIPSY OF URETERAL CALCULUS AND STENT INSERTION;  Surgeon: Alok Milan MD;  Location: [facility] OR;  Service: Urology;  Laterality: Right;    EXTRACORPOREAL SHOCK WAVE LITHOTRIPSY Right 2025    Procedure: LITHOTRIPSY, ESWL;  Surgeon: Alok Milan MD;  Location: [facility] OR;  Service: Urology;  Laterality: Right;    EYE SURGERY      FLEXIBLE CYSTOSCOPY Right 2025    Procedure: CYSTOSCOPY, FLEXIBLE;  Surgeon: Alok Milan MD;  Location: [facility] OR;  Service: Urology;  Laterality: Right;    SPLENECTOMY, TOTAL         Review of patient's allergies indicates:   Allergen Reactions    Niacin        No current facility-administered medications on file prior to encounter.     Current Outpatient Medications on File Prior to Encounter   Medication Sig    aspirin (ECOTRIN) 81 MG EC tablet Take 81 mg by mouth once daily.    atorvastatin (LIPITOR) 40 MG tablet Take 1 tablet (40 mg total) by mouth every evening.    [] ciprofloxacin HCl (CIPRO) 500 MG tablet Take 1 tablet (500 mg  total) by mouth 2 (two) times daily. for 3 days    lisinopriL 10 MG tablet Take 10 mg by mouth once daily.    metFORMIN (GLUCOPHAGE) 1000 MG tablet Take 1,000 mg by mouth 2 (two) times daily.    metoprolol succinate (TOPROL-XL) 25 MG 24 hr tablet Take 1 tablet by mouth once daily.    oxybutynin (DITROPAN) 5 MG Tab Take 1 tablet (5 mg total) by mouth every 8 (eight) hours as needed (Bladder spasms).    traMADoL (ULTRAM) 50 mg tablet Take 1 tablet (50 mg total) by mouth every 6 (six) hours as needed for Pain.    [DISCONTINUED] montelukast (SINGULAIR) 10 mg tablet Take 10 mg by mouth every evening.    [DISCONTINUED] tamsulosin (FLOMAX) 0.4 mg Cap Take 1 capsule (0.4 mg total) by mouth every evening. Take 1 nightly for stent/stone     Family History       Problem Relation (Age of Onset)    Cancer Paternal Aunt, Paternal Uncle    Diabetes Mother, Brother, Maternal Aunt, Maternal Grandmother    Heart disease Brother, Maternal Uncle, Paternal Uncle, Maternal Grandfather, Paternal Grandfather    Hyperlipidemia Father    Hypertension Mother, Father    No Known Problems Son    Skin cancer Mother          Tobacco Use    Smoking status: Former     Current packs/day: 0.00     Types: Cigarettes     Quit date: 10/9/2010     Years since quittin.5     Passive exposure: Past    Smokeless tobacco: Never   Substance and Sexual Activity    Alcohol use: Yes     Comment: occasionally    Drug use: No    Sexual activity: Yes     Partners: Female     Review of Systems   Constitutional:  Negative for chills and fever.   HENT:  Negative for congestion and sore throat.    Eyes:  Negative for visual disturbance.   Respiratory:  Negative for cough and shortness of breath.    Cardiovascular:  Negative for chest pain and palpitations.   Gastrointestinal:  Negative for abdominal pain, constipation, diarrhea, nausea and vomiting.   Endocrine: Negative for cold intolerance and heat intolerance.   Genitourinary:  Positive for dysuria and flank  pain. Negative for hematuria.   Musculoskeletal:  Negative for arthralgias and myalgias.   Skin:  Negative for rash.   Neurological:  Negative for tremors and seizures.   Hematological:  Negative for adenopathy. Does not bruise/bleed easily.   All other systems reviewed and are negative.    Objective:     Vital Signs (Most Recent):  Temp: 97.9 °F (36.6 °C) (05/06/25 0121)  Pulse: 63 (05/06/25 0531)  Resp: 16 (05/06/25 0214)  BP: (!) 121/56 (05/06/25 0531)  SpO2: 97 % (05/06/25 0541) Vital Signs (24h Range):  Temp:  [97.9 °F (36.6 °C)] 97.9 °F (36.6 °C)  Pulse:  [63-77] 63  Resp:  [16-18] 16  SpO2:  [92 %-99 %] 97 %  BP: (121-161)/(56-73) 121/56     Weight: 75.3 kg (166 lb 0.1 oz)  Body mass index is 31.37 kg/m².     Physical Exam  Vitals and nursing note reviewed.   Constitutional:       General: He is awake.      Appearance: Normal appearance. He is well-developed.   HENT:      Head: Normocephalic and atraumatic.      Nose: Nose normal. No septal deviation.   Eyes:      Conjunctiva/sclera: Conjunctivae normal.      Pupils: Pupils are equal, round, and reactive to light.   Neck:      Thyroid: No thyroid mass.      Vascular: No JVD.      Trachea: No tracheal tenderness or tracheal deviation.   Cardiovascular:      Rate and Rhythm: Normal rate and regular rhythm.      Heart sounds: Normal heart sounds, S1 normal and S2 normal. No murmur heard.     No friction rub. No gallop.   Pulmonary:      Effort: Pulmonary effort is normal.      Breath sounds: Normal breath sounds. No decreased breath sounds, wheezing, rhonchi or rales.   Abdominal:      General: Bowel sounds are normal. There is no distension.      Palpations: Abdomen is soft. There is no hepatomegaly, splenomegaly or mass.      Tenderness: There is no abdominal tenderness. There is right CVA tenderness.   Skin:     General: Skin is warm.      Findings: No rash.   Neurological:      General: No focal deficit present.      Mental Status: He is alert and oriented  to person, place, and time.      Cranial Nerves: No cranial nerve deficit.      Sensory: No sensory deficit.   Psychiatric:         Mood and Affect: Mood normal.         Behavior: Behavior normal. Behavior is cooperative.              CRANIAL NERVES     CN III, IV, VI   Pupils are equal, round, and reactive to light.       Significant Labs: All pertinent labs within the past 24 hours have been reviewed.  CBC:   Recent Labs   Lab 05/06/25  0208   WBC 10.89   HGB 15.4   HCT 44.6        CMP:   Recent Labs   Lab 05/06/25  0208      K 4.0      CO2 21*   *   BUN 15   CREATININE 1.2   CALCIUM 9.1   PROT 7.0   ALBUMIN 3.4*   BILITOT 0.6   ALKPHOS 72   AST 27   ALT 15   ANIONGAP 17*     Urine Studies:   Recent Labs   Lab 05/06/25  0437   APPEARANCEUA Hazy*   SPECGRAV 1.020   PROTEINUA 2+*   BILIRUBINUA Negative   UROBILINOGEN Negative   LEUKOCYTESUR 3+*   RBCUA >100*   WBCUA >100*   BACTERIA Many*       Significant Imaging: I have reviewed all pertinent imaging results/findings within the past 24 hours.  Imaging Results              CT Renal Stone Study ABD Pelvis WO (Final result)  Result time 05/06/25 03:39:10      Final result by Tanya Soliz MD (05/06/25 03:39:10)                   Impression:      1. Interval placement of double-J ureteral stent status post reported lithotripsy.  Multiple small calcific densities are present within the right renal collecting system/calices with mild right hydronephrosis similar to prior study.  Right periureteral/perinephric inflammatory change with ill-defined fluid and inflammatory change extending into the right pericolic gutter.  Correlation with urinalysis advised.  2. Air within the right renal collecting system and the urinary bladder, likely relating to recent instrumentation/procedure although correlation with urinalysis is once again advised to exclude infectious process.  3. Liquid stool in the colon which can be seen with diarrheal illness.   Clinical correlation advised.  4. Splenectomy.  5. Multiple additional findings as above.      Electronically signed by: Tanya Soliz MD  Date:    05/06/2025  Time:    03:39               Narrative:    EXAMINATION:  CT RENAL STONE STUDY ABD PELVIS WO    CLINICAL HISTORY:  Nephrolithiasis, symptomatic/complicated;    TECHNIQUE:  Low dose axial images, sagittal and coronal reformations were obtained from the lung bases to the pubic symphysis.  Contrast was not administered.    COMPARISON:  CT abdomen and pelvis 04/01/2025    FINDINGS:  The visualized lung bases are free of pleural fluid or focal consolidation.  The visualized portions of the heart and pericardium demonstrate calcific atherosclerosis of the coronary vessels.    Please note evaluation of solid organ parenchyma is limited due to lack of IV contrast.  The liver, slightly prominent in size and hypoattenuating which can be seen with hepatic steatosis.  Correlation with LFTs advised.  The spleen is surgically absent.  The pancreas and adrenal glands demonstrate an unremarkable noncontrast CT appearance.  No calcified stones are identified in the gallbladder lumen.    The patient is status post reported right-sided lithotripsy with placement of a double-J ureteral stent with proximal Tererro loop in the renal collecting system and distal Tererro loop coiled within the urinary bladder.  There are several small calcific densities within the right renal collecting system/calices.  Small foci of air within the collecting system presumably due to recent instrumentation/procedure although correlation with urinalysis advised.  There is continued mild hydronephrosis.  There is right periureteral and perinephric inflammatory change.  Ill-defined fluid and inflammatory change extending into the right pericolic gutter.   No definite calcific densities noted along the ureteral course of the stent.  There is nonobstructing punctate left-sided nephrolithiasis.  No left  hydroureteronephrosis.  There is a 4 cm left renal cortical hypodensity suggestive of a cyst.  The urinary bladder contains air presumably relating to instrumentation although correlation with urinalysis advised.    There is calcified and noncalcified atheromatous plaque throughout the abdominal aorta with redemonstration of calcified intraluminal plaque versus chronic dissection.  There is continued infrarenal abdominal aortic ectasia unchanged from prior studies.    There is liquid stool within the colon which can be seen with diarrheal illness.  Visualized loops of large and small bowel demonstrate no evidence of obstruction or inflammatory change.  The appendix is within normal limits.  No free intraperitoneal air portal venous gas.    Visualized osseous structures are intact.

## 2025-05-06 NOTE — ED PROVIDER NOTES
Encounter Date: 5/6/2025       History     Chief Complaint   Patient presents with    Flank Pain     Patient presents emergency department with reported right-sided flank pain he is status post lithotripsy with stent placement complains of intermittent severe pain states started again this evening he denies any hematuria no recorded fevers he reports that he has been urinating well drinking plenty of fluids        Review of patient's allergies indicates:   Allergen Reactions    Dapagliflozin Other (See Comments)     Myalgia (brand name Farxiga)    Niacin      Past Medical History:   Diagnosis Date    AK (actinic keratosis)     Anxiety     Benign hypertension     Cancer - bladder     Diabetes mellitus, type 2     Diverticulitis     Fatty liver     GERD (gastroesophageal reflux disease)     Hyperlipidemia     ITP (idiopathic thrombocytopenic purpura)     Kidney stones     Myocardial infarct 2010    Occlusive coronary artery disease      Past Surgical History:   Procedure Laterality Date    COLONOSCOPY  04/11/2016    Dr. Abbott, multiple polyps, recheck 6 month    CORONARY ARTERY BYPASS GRAFT  2010    x 5 vessels    CYSTOSCOPY W/ URETERAL STENT PLACEMENT Right 04/01/2025    Procedure: CYSTOSCOPY, WITH URETERAL STENT INSERTION;  Surgeon: Dara Trotter Jr., MD;  Location: [facility];  Service: Urology;  Laterality: Right;    CYSTOURETEROSCOPY, WITH HOLMIUM LASER LITHOTRIPSY OF URETERAL CALCULUS AND STENT INSERTION Right 5/2/2025    Procedure: CYSTOURETEROSCOPY, WITH THULIUM LASER LITHOTRIPSY OF URETERAL CALCULUS AND STENT INSERTION;  Surgeon: Alok Milan MD;  Location: [facility] OR;  Service: Urology;  Laterality: Right;    EXTRACORPOREAL SHOCK WAVE LITHOTRIPSY Right 04/16/2025    Procedure: LITHOTRIPSY, ESWL;  Surgeon: Alok Milan MD;  Location: [facility] OR;  Service: Urology;  Laterality: Right;    EYE SURGERY      FLEXIBLE CYSTOSCOPY Right 5/2/2025    Procedure: CYSTOSCOPY, FLEXIBLE;  Surgeon: Alok Milan  MD CHERI;  Location: [facility];  Service: Urology;  Laterality: Right;    SPLENECTOMY, TOTAL       Family History   Problem Relation Name Age of Onset    Diabetes Mother      Hypertension Mother      Skin cancer Mother      Diabetes Brother      Heart disease Brother      Hyperlipidemia Father      Hypertension Father      Diabetes Maternal Aunt      Heart disease Maternal Uncle      Cancer Paternal Aunt          thyroid    Heart disease Paternal Uncle      Cancer Paternal Uncle          lung    Diabetes Maternal Grandmother      Heart disease Maternal Grandfather      Heart disease Paternal Grandfather      No Known Problems Son      Melanoma Neg Hx      Psoriasis Neg Hx      Lupus Neg Hx      Eczema Neg Hx       Social History[1]  Review of Systems   Constitutional:  Negative for chills and fever.   Genitourinary:  Positive for flank pain. Negative for dysuria and hematuria.   All other systems reviewed and are negative.      Physical Exam     Initial Vitals [05/06/25 0121]   BP Pulse Resp Temp SpO2   (!) 161/71 77 18 97.9 °F (36.6 °C) 98 %      MAP       --         Physical Exam    Constitutional: He appears well-developed and well-nourished. No distress.   HENT:   Head: Normocephalic and atraumatic.   Right Ear: External ear normal.   Left Ear: External ear normal. Mouth/Throat: Oropharynx is clear and moist.   Eyes: Pupils are equal, round, and reactive to light.   Neck: Neck supple.   Normal range of motion.  Cardiovascular:  Normal rate, regular rhythm, S1 normal, S2 normal, normal heart sounds and intact distal pulses.           Pulmonary/Chest: Breath sounds normal.   Abdominal: Abdomen is soft. Bowel sounds are normal. There is no abdominal tenderness.   Musculoskeletal:         General: Normal range of motion.      Cervical back: Normal range of motion and neck supple.     Neurological: He is alert and oriented to person, place, and time. GCS eye subscore is 4. GCS verbal subscore is 5. GCS motor subscore is  6.   Skin: Skin is warm and dry. No rash noted.   Psychiatric: He has a normal mood and affect. His behavior is normal.         ED Course   Procedures  Labs Reviewed   COMPREHENSIVE METABOLIC PANEL - Abnormal       Result Value    Sodium 140      Potassium 4.0      Chloride 102      CO2 21 (*)     Glucose 146 (*)     BUN 15      Creatinine 1.2      Calcium 9.1      Protein Total 7.0      Albumin 3.4 (*)     Bilirubin Total 0.6      ALP 72      AST 27      ALT 15      Anion Gap 17 (*)     eGFR >60     URINALYSIS, REFLEX TO URINE CULTURE - Abnormal    Color, UA Yellow      Appearance, UA Hazy (*)     Spec Grav UA 1.020      pH, UA 6.0      Protein, UA 2+ (*)     Glucose, UA Trace (*)     Ketones, UA 2+ (*)     Blood, UA 3+ (*)     Bilirubin, UA Negative      Urobilinogen, UA Negative      Nitrites, UA Negative      Leukocyte Esterase, UA 3+ (*)    CBC WITH DIFFERENTIAL - Abnormal    WBC 10.89      RBC 4.51 (*)     Hgb 15.4      Hct 44.6      MCV 99 (*)     MCH 34.1 (*)     MCHC 34.5      RDW 11.4 (*)     Platelet Count 283      MPV 9.9      Nucleated RBC 0      Neut % 60.0      Lymph % 24.9      Mono % 10.9      Eos % 3.2      Basophil % 0.5      Imm Grans % 0.5      Neut # 6.5      Lymph # 2.71      Mono # 1.19 (*)     Eos # 0.35      Baso # 0.05      Imm Grans # 0.05 (*)    URINALYSIS MICROSCOPIC - Abnormal    RBC, UA >100 (*)     WBC, UA >100 (*)     WBC Clumps, UA Few (*)     Bacteria, UA Many (*)     Squamous Epithelial Cells, UA 2      Amorphous, UA Many (*)     Microscopic Comment       CULTURE, URINE   CBC W/ AUTO DIFFERENTIAL    Narrative:     The following orders were created for panel order CBC auto differential.  Procedure                               Abnormality         Status                     ---------                               -----------         ------                     CBC with Differential[[id number]]       Abnormal            Final result                 Please view results for these tests  on the individual orders.          Imaging Results              CT Renal Stone Study ABD Pelvis WO (Final result)  Result time 05/06/25 03:39:10      Final result by Tanya Soliz MD (05/06/25 03:39:10)                   Impression:      1. Interval placement of double-J ureteral stent status post reported lithotripsy.  Multiple small calcific densities are present within the right renal collecting system/calices with mild right hydronephrosis similar to prior study.  Right periureteral/perinephric inflammatory change with ill-defined fluid and inflammatory change extending into the right pericolic gutter.  Correlation with urinalysis advised.  2. Air within the right renal collecting system and the urinary bladder, likely relating to recent instrumentation/procedure although correlation with urinalysis is once again advised to exclude infectious process.  3. Liquid stool in the colon which can be seen with diarrheal illness.  Clinical correlation advised.  4. Splenectomy.  5. Multiple additional findings as above.      Electronically signed by: Tanya Soliz MD  Date:    05/06/2025  Time:    03:39               Narrative:    EXAMINATION:  CT RENAL STONE STUDY ABD PELVIS WO    CLINICAL HISTORY:  Nephrolithiasis, symptomatic/complicated;    TECHNIQUE:  Low dose axial images, sagittal and coronal reformations were obtained from the lung bases to the pubic symphysis.  Contrast was not administered.    COMPARISON:  CT abdomen and pelvis 04/01/2025    FINDINGS:  The visualized lung bases are free of pleural fluid or focal consolidation.  The visualized portions of the heart and pericardium demonstrate calcific atherosclerosis of the coronary vessels.    Please note evaluation of solid organ parenchyma is limited due to lack of IV contrast.  The liver, slightly prominent in size and hypoattenuating which can be seen with hepatic steatosis.  Correlation with LFTs advised.  The spleen is surgically absent.  The pancreas  and adrenal glands demonstrate an unremarkable noncontrast CT appearance.  No calcified stones are identified in the gallbladder lumen.    The patient is status post reported right-sided lithotripsy with placement of a double-J ureteral stent with proximal Porter loop in the renal collecting system and distal Porter loop coiled within the urinary bladder.  There are several small calcific densities within the right renal collecting system/calices.  Small foci of air within the collecting system presumably due to recent instrumentation/procedure although correlation with urinalysis advised.  There is continued mild hydronephrosis.  There is right periureteral and perinephric inflammatory change.  Ill-defined fluid and inflammatory change extending into the right pericolic gutter.   No definite calcific densities noted along the ureteral course of the stent.  There is nonobstructing punctate left-sided nephrolithiasis.  No left hydroureteronephrosis.  There is a 4 cm left renal cortical hypodensity suggestive of a cyst.  The urinary bladder contains air presumably relating to instrumentation although correlation with urinalysis advised.    There is calcified and noncalcified atheromatous plaque throughout the abdominal aorta with redemonstration of calcified intraluminal plaque versus chronic dissection.  There is continued infrarenal abdominal aortic ectasia unchanged from prior studies.    There is liquid stool within the colon which can be seen with diarrheal illness.  Visualized loops of large and small bowel demonstrate no evidence of obstruction or inflammatory change.  The appendix is within normal limits.  No free intraperitoneal air portal venous gas.    Visualized osseous structures are intact.                                       Medications   0.9% NaCl infusion (125 mL/hr Intravenous New Bag 5/6/25 0227)   piperacillin-tazobactam (ZOSYN) 3.375 g in D5W 100 mL IVPB (MB+) (has no administration in time range)    HYDROmorphone injection 1 mg (1 mg Intravenous Given 25 0214)     Medical Decision Making  CT scan shows some persistent hydronephrosis the ureteral stent is in appropriate positioning laboratory evaluation reviewed patient's urinalysis does show evidence of significant bacteria leukocytes I have ordered a dose of Zosyn in the emergency department and I have consulted Ms. Juarez CAAL who will evaluate patient in the ER for admission    Amount and/or Complexity of Data Reviewed  Labs: ordered. Decision-making details documented in ED Course.  Radiology: ordered. Decision-making details documented in ED Course.    Risk  Prescription drug management.  Decision regarding hospitalization.                                      Clinical Impression:  Final diagnoses:  [N10] Acute pyelonephritis (Primary)          ED Disposition Condition    Admit                     [1]   Social History  Tobacco Use    Smoking status: Former     Current packs/day: 0.00     Types: Cigarettes     Quit date: 10/9/2010     Years since quittin.5     Passive exposure: Past    Smokeless tobacco: Never   Substance Use Topics    Alcohol use: Yes     Comment: occasionally    Drug use: No        Mendoza Dasilva MD  [id number]

## 2025-05-06 NOTE — HPI
Aleksandra Keys is a 69 year old male with a past medical history of CAD status post CABG, hyperlipidemia, previous kidney stones, and diverticulitis who presented to the ED with a complaint of right sided flank pain. He has a history of kidney stones with associated hydronephrosis, and is currently s/p lithotripsy with stent placement. He reports intermittent spasms and flank pain that started tonight. He states he has been drinking plenty of fluids and has been urinating without difficulty. He denies hematuria or fevers. He denies other complaint.    ED work up included a CBC which was unremarkable. CMP was also unremarkable. Urinalysis was significant for infection and hematuria. CT renal stone protocol revealed:  Interval placement of double-J ureteral stent status post reported lithotripsy.  Multiple small calcific densities are present within the right renal collecting system/calices with mild right hydronephrosis similar to prior study.  Right periureteral/perinephric inflammatory change with ill-defined fluid and inflammatory change extending into the right pericolic gutter.  Correlation with urinalysis advised.  2. Air within the right renal collecting system and the urinary bladder, likely relating to recent instrumentation/procedure although correlation with urinalysis is once again advised to exclude infectious process.  3. Liquid stool in the colon which can be seen with diarrheal illness.  Clinical correlation advised.  4. Splenectomy.    He was initiated on Zosyn in the ED and given dilaudid for pain. Hospital Medicine consulted for admission and further management.

## 2025-05-06 NOTE — CONSULTS
[facility] Urology Consult Note:    Aleksandra Keys is a 69 y.o. male who presents for right flank pain.    Patient with a history of HTN, DM, diverticulitis, HLD, ITP and kidney stones who presented to the ED on 4/1/25 with severe right flank pain for 3 days.      CT abdomen pelvis with contrast with an 8 mm right UPJ stone with mild right hydronephrosis. Urology consulted to assist with management. Urine with positive nitrite, >100 RBC, 20 WBC.      He reports a history of stones s/p ESWL x 2 in the past.       Interval History    5/6/25: Underwent urgent right stent placement on 4/1/25. He is now s/p right ESWL on 4/17/25 and right ureteroscopy with laser lithotripsy on 5/2/25 with Dr. Milan. Discharged home, but presented to [facility] emergency department with right flank pain today. States he was discharged home with no pain medication. CT renal stone with multiple small stones in the right lower pole with mild right hydro. Creatinine 1.2. UA with many bacteria, >100 WBC and >100 RBC. Urology consulted. States his pain has improved since receiving pain medication.     Denies any fever, chills, gross hematuria, bone pain, unintentional weight loss,  trauma or history of  malignancy.         PSA  1.5 10/3/23    Past Medical History:   Diagnosis Date    AK (actinic keratosis)     Anxiety     Benign hypertension     Cancer - bladder     Diabetes mellitus, type 2     Diverticulitis     Fatty liver     GERD (gastroesophageal reflux disease)     Hyperlipidemia     ITP (idiopathic thrombocytopenic purpura)     Kidney stones     Myocardial infarct 2010    Occlusive coronary artery disease        Past Surgical History:   Procedure Laterality Date    COLONOSCOPY  04/11/2016    Dr. Abbott, multiple polyps, recheck 6 month    CORONARY ARTERY BYPASS GRAFT  2010    x 5 vessels    CYSTOSCOPY W/ URETERAL STENT PLACEMENT Right 04/01/2025    Procedure: CYSTOSCOPY, WITH URETERAL STENT INSERTION;  Surgeon: Sergo  Dara CORDERO Jr., MD;  Location: [facility] OR;  Service: Urology;  Laterality: Right;    CYSTOURETEROSCOPY, WITH HOLMIUM LASER LITHOTRIPSY OF URETERAL CALCULUS AND STENT INSERTION Right 5/2/2025    Procedure: CYSTOURETEROSCOPY, WITH THULIUM LASER LITHOTRIPSY OF URETERAL CALCULUS AND STENT INSERTION;  Surgeon: Alok Milan MD;  Location: [facility] OR;  Service: Urology;  Laterality: Right;    EXTRACORPOREAL SHOCK WAVE LITHOTRIPSY Right 04/16/2025    Procedure: LITHOTRIPSY, ESWL;  Surgeon: Alok Milan MD;  Location: [facility] OR;  Service: Urology;  Laterality: Right;    EYE SURGERY      FLEXIBLE CYSTOSCOPY Right 5/2/2025    Procedure: CYSTOSCOPY, FLEXIBLE;  Surgeon: Alok Milan MD;  Location: [facility] OR;  Service: Urology;  Laterality: Right;    SPLENECTOMY, TOTAL         Family History   Problem Relation Name Age of Onset    Diabetes Mother      Hypertension Mother      Skin cancer Mother      Diabetes Brother      Heart disease Brother      Hyperlipidemia Father      Hypertension Father      Diabetes Maternal Aunt      Heart disease Maternal Uncle      Cancer Paternal Aunt          thyroid    Heart disease Paternal Uncle      Cancer Paternal Uncle          lung    Diabetes Maternal Grandmother      Heart disease Maternal Grandfather      Heart disease Paternal Grandfather      No Known Problems Son      Melanoma Neg Hx      Psoriasis Neg Hx      Lupus Neg Hx      Eczema Neg Hx         Review of patient's allergies indicates:   Allergen Reactions    Niacin        Medications Reviewed: see MAR      FOCUSED PHYSICAL EXAM:    Vitals:    05/06/25 1225   BP: (!) 114/59   Pulse: (!) 54   Resp: 14   Temp: 98 °F (36.7 °C)     Body mass index is 31.28 kg/m².       General: Alert, cooperative, no distress, appears stated age  Abdomen: Soft, non-tender, no CVA tenderness, non-distended      LABS:    Recent Results (from the past 2 weeks)   POCT glucose    Collection Time: 05/02/25  5:51 AM   Result Value Ref Range    POCT  Glucose 194 (H) 70 - 110 mg/dL   CBC Without Differential    Collection Time: 05/02/25  5:52 AM   Result Value Ref Range    WBC 15.64 (H) 3.90 - 12.70 K/uL    RBC 4.85 4.60 - 6.20 M/uL    Hemoglobin 16.8 14.0 - 18.0 g/dL    Hematocrit 46.3 40.0 - 54.0 %    MCV 96 82 - 98 fL    MCH 34.6 (H) 27.0 - 31.0 pg    MCHC 36.3 (H) 32.0 - 36.0 g/dL    RDW 11.6 11.5 - 14.5 %    Platelets 219 150 - 450 K/uL    MPV 10.1 9.2 - 12.9 fL   Basic Metabolic Panel    Collection Time: 05/02/25  5:52 AM   Result Value Ref Range    Sodium 138 136 - 145 mmol/L    Potassium 4.1 3.5 - 5.1 mmol/L    Chloride 103 95 - 110 mmol/L    CO2 22 (L) 23 - 29 mmol/L    Glucose 182 (H) 70 - 110 mg/dL    BUN 14 8 - 23 mg/dL    Creatinine 0.90 0.50 - 1.40 mg/dL    Calcium 9.5 8.7 - 10.5 mg/dL    Anion Gap 13 8 - 16 mmol/L    eGFR >60 >60 mL/min/1.73 m^2   Urinary Stone Analysis    Collection Time: 05/02/25  7:21 AM   Result Value Ref Range    Calculi Mass 63 mg    Calculi Description See Note     Calculi Composition See Note    POCT glucose    Collection Time: 05/02/25  7:36 AM   Result Value Ref Range    POCT Glucose 189 (H) 70 - 110 mg/dL   Comprehensive metabolic panel    Collection Time: 05/06/25  2:08 AM   Result Value Ref Range    Sodium 140 136 - 145 mmol/L    Potassium 4.0 3.5 - 5.1 mmol/L    Chloride 102 95 - 110 mmol/L    CO2 21 (L) 23 - 29 mmol/L    Glucose 146 (H) 70 - 110 mg/dL    BUN 15 8 - 23 mg/dL    Creatinine 1.2 0.5 - 1.4 mg/dL    Calcium 9.1 8.7 - 10.5 mg/dL    Protein Total 7.0 6.0 - 8.4 gm/dL    Albumin 3.4 (L) 3.5 - 5.2 g/dL    Bilirubin Total 0.6 0.1 - 1.0 mg/dL    ALP 72 40 - 150 unit/L    AST 27 11 - 45 unit/L    ALT 15 10 - 44 unit/L    Anion Gap 17 (H) 8 - 16 mmol/L    eGFR >60 >60 mL/min/1.73/m2   CBC with Differential    Collection Time: 05/06/25  2:08 AM   Result Value Ref Range    WBC 10.89 3.90 - 12.70 K/uL    RBC 4.51 (L) 4.60 - 6.20 M/uL    Hgb 15.4 14.0 - 18.0 gm/dL    Hct 44.6 40.0 - 54.0 %    MCV 99 (H) 82 - 98 fL     MCH 34.1 (H) 27.0 - 31.0 pg    MCHC 34.5 32.0 - 36.0 g/dL    RDW 11.4 (L) 11.5 - 14.5 %    Platelet Count 283 150 - 450 K/uL    MPV 9.9 9.2 - 12.9 fL    Nucleated RBC 0 <=0 /100 WBC    Neut % 60.0 38 - 73 %    Lymph % 24.9 18 - 48 %    Mono % 10.9 4 - 15 %    Eos % 3.2 0 - 8 %    Basophil % 0.5 <=1.9 %    Imm Grans % 0.5 0.0 - 0.5 %    Neut # 6.5 1.8 - 7.7 K/uL    Lymph # 2.71 1 - 4.8 K/uL    Mono # 1.19 (H) 0.3 - 1 K/uL    Eos # 0.35 <=0.5 K/uL    Baso # 0.05 <=0.2 K/uL    Imm Grans # 0.05 (H) 0.00 - 0.04 K/uL   Urinalysis, Reflex to Urine Culture Urine, Clean Catch    Collection Time: 05/06/25  4:37 AM    Specimen: Urine   Result Value Ref Range    Color, UA Yellow Yellow, Straw, Amy    Appearance, UA Hazy (A) Clear    Spec Grav UA 1.020 1.005 - 1.030    pH, UA 6.0 5.0 - 8.0    Protein, UA 2+ (A) Negative    Glucose, UA Trace (A) Negative    Ketones, UA 2+ (A) Negative    Blood, UA 3+ (A) Negative    Bilirubin, UA Negative Negative    Urobilinogen, UA Negative <2.0 EU/dL    Nitrites, UA Negative Negative    Leukocyte Esterase, UA 3+ (A) Negative   Urinalysis Microscopic    Collection Time: 05/06/25  4:37 AM   Result Value Ref Range    RBC, UA >100 (H) <=4 /HPF    WBC, UA >100 (H) <=5 /HPF    WBC Clumps, UA Few (A) None Seen    Bacteria, UA Many (A) None, Rare, Occasional /HPF    Squamous Epithelial Cells, UA 2 /HPF    Amorphous, UA Many (A) None, Occasional, Few, Moderate, Rare    Microscopic Comment     POCT glucose    Collection Time: 05/06/25  7:37 AM   Result Value Ref Range    POCT Glucose 163 (H) 70 - 110 mg/dL   POCT glucose    Collection Time: 05/06/25 11:45 AM   Result Value Ref Range    POCT Glucose 152 (H) 70 - 110 mg/dL         Assessment/Diagnosis:    Right flank pain  Right nephrolithiasis s/p ESWL and ureteroscopy      Plans:    - Extensive discussion with patient regarding the etiology and management of his right flank pain s/p right ESWL and ureteroscopy with his primary urologist Dr. Milan.  Explained that pain is expected after stent placement. No indication for any urgent intervention.   - Follow up urine culture.   - Ok to discharge once medically stable. Can follow up with Dr. Milan for stent removal as an outpatient.

## 2025-05-06 NOTE — HOSPITAL COURSE
Aleksandra Keys is a 69 year old male with a past medical history of recent R ureteral stent and lithotripsy, obesity, CAD s/p CABG, HTN, HLD, DM and obesity who presented with with right-sided flank pain secondary to recent lithotripsy with inflammation to the right kidney. A urine culture was unremarkable. CT scan showed inflammation to the right kidney with stable mild hydronephrosis. He was on Zosyn and IV fluids. Urology was consulted and recommended outpatient follow up with the patient's urologist for stent removal.

## 2025-05-07 VITALS
RESPIRATION RATE: 14 BRPM | TEMPERATURE: 98 F | OXYGEN SATURATION: 95 % | DIASTOLIC BLOOD PRESSURE: 60 MMHG | HEIGHT: 61 IN | HEART RATE: 52 BPM | SYSTOLIC BLOOD PRESSURE: 110 MMHG | BODY MASS INDEX: 31.26 KG/M2 | WEIGHT: 165.56 LBS

## 2025-05-07 PROBLEM — N10 ACUTE PYELONEPHRITIS: Status: RESOLVED | Noted: 2025-05-06 | Resolved: 2025-05-07

## 2025-05-07 PROBLEM — N39.0 UTI (URINARY TRACT INFECTION): Status: RESOLVED | Noted: 2025-04-01 | Resolved: 2025-05-07

## 2025-05-07 PROBLEM — E78.5 HYPERLIPIDEMIA ASSOCIATED WITH TYPE 2 DIABETES MELLITUS: Status: RESOLVED | Noted: 2020-09-22 | Resolved: 2025-05-07

## 2025-05-07 PROBLEM — I71.02 DISSECTION OF ABDOMINAL AORTA: Status: RESOLVED | Noted: 2022-09-06 | Resolved: 2025-05-07

## 2025-05-07 PROBLEM — E11.69 HYPERLIPIDEMIA ASSOCIATED WITH TYPE 2 DIABETES MELLITUS: Status: RESOLVED | Noted: 2020-09-22 | Resolved: 2025-05-07

## 2025-05-07 PROBLEM — K85.90 ACUTE PANCREATITIS: Status: RESOLVED | Noted: 2022-09-06 | Resolved: 2025-05-07

## 2025-05-07 PROBLEM — A49.9 RECURRENT BACTERIAL INFECTION: Status: RESOLVED | Noted: 2021-05-20 | Resolved: 2025-05-07

## 2025-05-07 LAB
ABSOLUTE EOSINOPHIL (SMH): 0.61 K/UL
ABSOLUTE MONOCYTE (SMH): 0.77 K/UL (ref 0.3–1)
ABSOLUTE NEUTROPHIL COUNT (SMH): 2.7 K/UL (ref 1.8–7.7)
ANION GAP (SMH): 11 MMOL/L (ref 8–16)
BASOPHILS # BLD AUTO: 0.1 K/UL
BASOPHILS NFR BLD AUTO: 1.4 %
BUN SERPL-MCNC: 11 MG/DL (ref 8–23)
CALCIUM SERPL-MCNC: 9 MG/DL (ref 8.7–10.5)
CHLORIDE SERPL-SCNC: 106 MMOL/L (ref 95–110)
CO2 SERPL-SCNC: 23 MMOL/L (ref 23–29)
CREAT SERPL-MCNC: 1.1 MG/DL (ref 0.5–1.4)
ERYTHROCYTE [DISTWIDTH] IN BLOOD BY AUTOMATED COUNT: 11.4 % (ref 11.5–14.5)
GFR SERPLBLD CREATININE-BSD FMLA CKD-EPI: >60 ML/MIN/1.73/M2
GLUCOSE SERPL-MCNC: 145 MG/DL (ref 70–110)
HCT VFR BLD AUTO: 45.3 % (ref 40–54)
HGB BLD-MCNC: 15.4 GM/DL (ref 14–18)
IMM GRANULOCYTES # BLD AUTO: 0.03 K/UL (ref 0–0.04)
IMM GRANULOCYTES NFR BLD AUTO: 0.4 % (ref 0–0.5)
LYMPHOCYTES # BLD AUTO: 3.08 K/UL (ref 1–4.8)
MCH RBC QN AUTO: 33.5 PG (ref 27–31)
MCHC RBC AUTO-ENTMCNC: 34 G/DL (ref 32–36)
MCV RBC AUTO: 99 FL (ref 82–98)
NUCLEATED RBC (/100WBC) (SMH): 0 /100 WBC
PLATELET # BLD AUTO: 308 K/UL (ref 150–450)
PMV BLD AUTO: 10.1 FL (ref 9.2–12.9)
POCT GLUCOSE: 177 MG/DL (ref 70–110)
POCT GLUCOSE: 182 MG/DL (ref 70–110)
POTASSIUM SERPL-SCNC: 3.7 MMOL/L (ref 3.5–5.1)
POTASSIUM SERPL-SCNC: 4.7 MMOL/L (ref 3.5–5.1)
RBC # BLD AUTO: 4.6 M/UL (ref 4.6–6.2)
RELATIVE EOSINOPHIL (SMH): 8.4 % (ref 0–8)
RELATIVE LYMPHOCYTE (SMH): 42.3 % (ref 18–48)
RELATIVE MONOCYTE (SMH): 10.6 % (ref 4–15)
RELATIVE NEUTROPHIL (SMH): 36.9 % (ref 38–73)
SODIUM SERPL-SCNC: 140 MMOL/L (ref 136–145)
WBC # BLD AUTO: 7.28 K/UL (ref 3.9–12.7)

## 2025-05-07 PROCEDURE — 85025 COMPLETE CBC W/AUTO DIFF WBC: CPT | Performed by: STUDENT IN AN ORGANIZED HEALTH CARE EDUCATION/TRAINING PROGRAM

## 2025-05-07 PROCEDURE — 25000003 PHARM REV CODE 250: Performed by: NURSE PRACTITIONER

## 2025-05-07 PROCEDURE — 36415 COLL VENOUS BLD VENIPUNCTURE: CPT | Performed by: STUDENT IN AN ORGANIZED HEALTH CARE EDUCATION/TRAINING PROGRAM

## 2025-05-07 PROCEDURE — 63600175 PHARM REV CODE 636 W HCPCS: Performed by: NURSE PRACTITIONER

## 2025-05-07 PROCEDURE — 80048 BASIC METABOLIC PNL TOTAL CA: CPT | Performed by: STUDENT IN AN ORGANIZED HEALTH CARE EDUCATION/TRAINING PROGRAM

## 2025-05-07 PROCEDURE — 25000003 PHARM REV CODE 250: Performed by: EMERGENCY MEDICINE

## 2025-05-07 PROCEDURE — 84132 ASSAY OF SERUM POTASSIUM: CPT | Performed by: STUDENT IN AN ORGANIZED HEALTH CARE EDUCATION/TRAINING PROGRAM

## 2025-05-07 PROCEDURE — 25000003 PHARM REV CODE 250: Performed by: STUDENT IN AN ORGANIZED HEALTH CARE EDUCATION/TRAINING PROGRAM

## 2025-05-07 RX ADMIN — ASPIRIN 81 MG: 81 TABLET, COATED ORAL at 09:05

## 2025-05-07 RX ADMIN — PANTOPRAZOLE SODIUM 40 MG: 40 INJECTION, POWDER, FOR SOLUTION INTRAVENOUS at 09:05

## 2025-05-07 RX ADMIN — PIPERACILLIN AND TAZOBACTAM 4.5 G: 4; .5 INJECTION, POWDER, FOR SOLUTION INTRAVENOUS; PARENTERAL at 05:05

## 2025-05-07 RX ADMIN — POTASSIUM BICARBONATE 50 MEQ: 977.5 TABLET, EFFERVESCENT ORAL at 05:05

## 2025-05-07 RX ADMIN — SODIUM CHLORIDE 125 ML/HR: 9 INJECTION, SOLUTION INTRAVENOUS at 02:05

## 2025-05-07 NOTE — DISCHARGE SUMMARY
[facility] Medicine  Discharge Summary      Patient Name: Aleksandra Keys  MRN: [id number]  ACE: [id number]  Patient Class: IP- Inpatient  Admission Date: 5/6/2025  Hospital Length of Stay: 1 days  Discharge Date and Time: No discharge date for patient encounter.  Attending Physician: Kenneth Jeffries MD   Discharging Provider: Kenneth Jeffries MD  Primary Care Provider: Fernanda Hermosillo MD    Primary Care Team: Networked reference to record PCT     HPI:     Aleksandra Keys is a 69 year old male with a past medical history of CAD status post CABG, hyperlipidemia, previous kidney stones, and diverticulitis who presented to the ED with a complaint of right sided flank pain. He has a history of kidney stones with associated hydronephrosis, and is currently s/p lithotripsy with stent placement. He reports intermittent spasms and flank pain that started tonight. He states he has been drinking plenty of fluids and has been urinating without difficulty. He denies hematuria or fevers. He denies other complaint.    ED work up included a CBC which was unremarkable. CMP was also unremarkable. Urinalysis was significant for infection and hematuria. CT renal stone protocol revealed:  Interval placement of double-J ureteral stent status post reported lithotripsy.  Multiple small calcific densities are present within the right renal collecting system/calices with mild right hydronephrosis similar to prior study.  Right periureteral/perinephric inflammatory change with ill-defined fluid and inflammatory change extending into the right pericolic gutter.  Correlation with urinalysis advised.  2. Air within the right renal collecting system and the urinary bladder, likely relating to recent instrumentation/procedure although correlation with urinalysis is once again advised to exclude infectious process.  3. Liquid stool in the colon which can be seen with diarrheal illness.  Clinical correlation  advised.  4. Splenectomy.    He was initiated on Zosyn in the ED and given dilaudid for pain. Hospital Medicine consulted for admission and further management.       * No surgery found *      Hospital Course:   Aleksandra Keys is a 69 year old male with a past medical history of recent R ureteral stent and lithotripsy, obesity, CAD s/p CABG, HTN, HLD, DM and obesity who presented with with right-sided flank pain secondary to recent lithotripsy with inflammation to the right kidney. A urine culture was unremarkable. CT scan showed inflammation to the right kidney with stable mild hydronephrosis. He was on Zosyn and IV fluids. Urology was consulted and recommended outpatient follow up with the patient's urologist for stent removal.     Goals of Care Treatment Preferences:  Code Status: Full Code      SDOH Screening:  The patient was screened for utility difficulties, food insecurity, transport difficulties, housing insecurity, and interpersonal safety and there were no concerns identified this admission.     Consults:   Consults (From admission, onward)          Status Ordering Provider     Inpatient consult to Urology  Once        Provider:  Dara Trotter Jr., MD    Completed MADDY GAMING            Assessment & Plan  Hypertension associated with diabetes  -Lisinopril 10  -Metoprolol XL 25    Hyperlipidemia  -Statin  GERD (gastroesophageal reflux disease)  -PPI  Obesity (BMI 30.0-34.9)  Body mass index is 31.28 kg/m². Morbid obesity complicates all aspects of disease management from diagnostic modalities to treatment.       Diabetes mellitus type 2, controlled  Patient's FSGs are controlled on current medication regimen.  Last A1c reviewed-   Lab Results   Component Value Date    HGBA1C 6.7 (H) 10/25/2024     Most recent fingerstick glucose reviewed-   Recent Labs   Lab 05/06/25  0737 05/06/25  1145 05/06/25  1719 05/06/25 2022   POCTGLUCOSE 163* 152* 139* 169*     Current correctional scale  Low  Maintain  anti-hyperglycemic dose as follows-   Antihyperglycemics (From admission, onward)      Start     Stop Route Frequency Ordered    05/06/25 0635  insulin aspart U-100 pen 0-5 Units         -- SubQ Before meals & nightly PRN 05/06/25 0536          Hold Oral hypoglycemics while patient is in the hospital.  CAD (coronary artery disease)  -Telemetry  -Statin  -ASA  -Metoprolol  Ureteral stent present  -Follow up with Urology for removal  Final Active Diagnoses:    Diagnosis Date Noted POA    Ureteral stent present [Z96.0] 04/08/2025 Not Applicable    CAD (coronary artery disease) [I25.10] 09/06/2022 Yes    Diabetes mellitus type 2, controlled [E11.9] 03/07/2015 Yes    Obesity (BMI 30.0-34.9) [E66.811] 03/07/2015 Yes    GERD (gastroesophageal reflux disease) [K21.9]  Yes    Hyperlipidemia [E78.5]  Yes    Hypertension associated with diabetes [E11.59, I15.2]  Yes      Problems Resolved During this Admission:    Diagnosis Date Noted Date Resolved POA    PRINCIPAL PROBLEM:  Acute pyelonephritis [N10] 05/06/2025 05/07/2025 Yes    S/P CABG x 5 [Z95.1] 03/07/2015 05/07/2025 Not Applicable       Discharged Condition: stable    Disposition: Home or Self Care    Follow Up:    Patient Instructions:      Diet diabetic     Diet Cardiac     Notify your health care provider if you experience any of the following:  temperature >100.4     Notify your health care provider if you experience any of the following:  persistent nausea and vomiting or diarrhea     Notify your health care provider if you experience any of the following:  redness, tenderness, or signs of infection (pain, swelling, redness, odor or green/yellow discharge around incision site)     Notify your health care provider if you experience any of the following:  difficulty breathing or increased cough     Notify your health care provider if you experience any of the following:  severe persistent headache     Notify your health care provider if you experience any of the  following:  persistent dizziness, light-headedness, or visual disturbances     Notify your health care provider if you experience any of the following:  increased confusion or weakness     Activity as tolerated       Significant Diagnostic Studies: Labs: All labs within the past 24 hours have been reviewed    Pending Diagnostic Studies:       Procedure Component Value Units Date/Time    Potassium [[id number]]     Order Status: Sent Lab Status: No result     Specimen: Blood            Medications:  Reconciled Home Medications:      Medication List        CONTINUE taking these medications      aspirin 81 MG EC tablet  Commonly known as: ECOTRIN  Take 81 mg by mouth once daily.     atorvastatin 40 MG tablet  Commonly known as: LIPITOR  Take 1 tablet (40 mg total) by mouth every evening.     lisinopriL 10 MG tablet  Take 10 mg by mouth once daily.     metFORMIN 1000 MG tablet  Commonly known as: GLUCOPHAGE  Take 1,000 mg by mouth 2 (two) times daily.     metoprolol succinate 25 MG 24 hr tablet  Commonly known as: TOPROL-XL  Take 1 tablet by mouth once daily.     oxybutynin 5 MG Tab  Commonly known as: DITROPAN  Take 1 tablet (5 mg total) by mouth every 8 (eight) hours as needed (Bladder spasms).     traMADoL 50 mg tablet  Commonly known as: ULTRAM  Take 1 tablet (50 mg total) by mouth every 6 (six) hours as needed for Pain.            STOP taking these medications      ciprofloxacin HCl 500 MG tablet  Commonly known as: CIPRO              Indwelling Lines/Drains at time of discharge:   Lines/Drains/Airways       Drain  Duration                  Ureteral Drain/Stent 04/01/25 1445 Right ureter 6 Fr. 35 days                    Time spent on the discharge of patient: 31 minutes         Kenneth Jeffries MD  Department of Hospital Medicine  [facility]/Surg

## 2025-05-07 NOTE — PLAN OF CARE
Pt clear for DC from CM standpoint. Discharging home.     Scheduled with DC clinic for 5/16. Added to AVS.    Can DC after Dr. Mervin begum.      05/07/25 1313   Final Note   Assessment Type Final Discharge Note   Anticipated Discharge Disposition Home   Hospital Resources/Appts/Education Provided Appointments scheduled and added to AVS

## 2025-05-07 NOTE — ASSESSMENT & PLAN NOTE
Patient is chronically on statin.will not continue for now, held for Npo status. Monitor clinically. Last LDL was   Lab Results   Component Value Date    LDLCALC 67 10/03/2023          
"Patient's FSGs are controlled on current medication regimen.  Last A1c reviewed-   Lab Results   Component Value Date    HGBA1C 6.7 (H) 10/25/2024     Most recent fingerstick glucose reviewed- No results for input(s): "POCTGLUCOSE" in the last 24 hours.  Current correctional scale  Low  Maintain anti-hyperglycemic dose as follows-   Antihyperglycemics (From admission, onward)      Start     Stop Route Frequency Ordered    05/06/25 0635  insulin aspart U-100 pen 0-5 Units         -- SubQ Before meals & nightly PRN 05/06/25 0536          Hold Oral hypoglycemics while patient is in the hospital.  "
-Follow up with Urology for removal  
-Lisinopril 10  -Metoprolol XL 25    
-PPI  
-Statin  
-Telemetry  -Statin  -ASA  -Metoprolol  
Admit to inpatient  Has ureteral stents in place from Dr. Chase for kidney stones, is s/p lithotripsy  Small pieces are noted in the kidneys with mild hydronephrosis  Complaining of significant pain  +UTI  On Zosyn  NPO in case of procedure      
Body mass index is 31.28 kg/m². Morbid obesity complicates all aspects of disease management from diagnostic modalities to treatment.       
Body mass index is 31.28 kg/m². Morbid obesity complicates all aspects of disease management from diagnostic modalities to treatment. Weight loss encouraged and health benefits explained to patient.      
Chronic, controlled.  Latest blood pressure and vitals reviewed-     Temp:  [97.9 °F (36.6 °C)-98.3 °F (36.8 °C)]   Pulse:  [62-77]   Resp:  [16-18]   BP: (121-161)/(56-74)   SpO2:  [92 %-99 %] .   Home meds for hypertension were reviewed and noted below-  Hypertension Medications              lisinopriL 10 MG tablet Take 10 mg by mouth once daily.    metoprolol succinate (TOPROL-XL) 25 MG 24 hr tablet Take 1 tablet by mouth once daily.            While in the hospital, will manage blood pressure as follows; Continue home antihypertensive regimen    Will utilize p.r.n. blood pressure medication only if patient's blood pressure greater than 180/110 and he develops symptoms such as worsening chest pain or shortness of breath.      
Noted  Consult urology    
Noted  Trend labs    
Noted, on tele    
Noted, protonix    
Patient with known CAD s/p CABG, which is controlled Will continue ASA and monitor for S/Sx of angina/ACS. Continue to monitor on telemetry.   
Patient's FSGs are controlled on current medication regimen.  Last A1c reviewed-   Lab Results   Component Value Date    HGBA1C 6.7 (H) 10/25/2024     Most recent fingerstick glucose reviewed-   Recent Labs   Lab 05/06/25  0737 05/06/25  1145 05/06/25  1719 05/06/25 2022   POCTGLUCOSE 163* 152* 139* 169*     Current correctional scale  Low  Maintain anti-hyperglycemic dose as follows-   Antihyperglycemics (From admission, onward)      Start     Stop Route Frequency Ordered    05/06/25 0635  insulin aspart U-100 pen 0-5 Units         -- SubQ Before meals & nightly PRN 05/06/25 0536          Hold Oral hypoglycemics while patient is in the hospital.  
15-Jul-2019 13:03

## 2025-05-08 LAB — BACTERIA UR CULT: NO GROWTH

## 2025-05-12 ENCOUNTER — LAB VISIT (OUTPATIENT)
Dept: LAB | Facility: HOSPITAL | Age: 70
End: 2025-05-12
Attending: UROLOGY
Payer: MEDICARE

## 2025-05-12 DIAGNOSIS — N20.1 URETERAL STONE: ICD-10-CM

## 2025-05-12 LAB
EAG (SMH): 154 MG/DL (ref 68–131)
HBA1C MFR BLD: 7 % (ref 4–5.6)

## 2025-05-12 PROCEDURE — 83036 HEMOGLOBIN GLYCOSYLATED A1C: CPT | Mod: GA

## 2025-05-12 PROCEDURE — 36415 COLL VENOUS BLD VENIPUNCTURE: CPT

## (undated) DEVICE — BOWL STERILE LARGE 32OZ

## (undated) DEVICE — CATH POLLACK OPEN-END FLEXI-TI

## (undated) DEVICE — GOWN POLY REINF BRTH SLV LG

## (undated) DEVICE — BAG LINGEMAN DRAIN UROLOGY

## (undated) DEVICE — SOL NORMAL USPCA 0.9%

## (undated) DEVICE — SOL IRRI STRL WATER 1000ML

## (undated) DEVICE — GUIDE WIRE MOTION .035 X 150CM

## (undated) DEVICE — JELLY SURGILUBE LUBE TUBE 2OZ

## (undated) DEVICE — DRAPE CYSTOSCOPY LARGE

## (undated) DEVICE — GOWN POLY REINF BRTH SLV XL

## (undated) DEVICE — SPONGE BULKEE II ABSRB 6X6.75

## (undated) DEVICE — DRAPE LEGGINGS CUFF 31X48IN

## (undated) DEVICE — COVER TABLE 44X90 STERILE

## (undated) DEVICE — SCRUB DYNA-HEX LIQ 4% CHG 4OZ

## (undated) DEVICE — GLOVE SENSICARE PI ALOE 6

## (undated) DEVICE — IRRIGATION SET Y-TYPE TUR/BLAD

## (undated) DEVICE — SYS LABEL CORRECT MED

## (undated) DEVICE — CONTAINER SPECIMEN OR STER 4OZ

## (undated) DEVICE — SLEEVE SCD EXPRESS KNEE MEDIUM